# Patient Record
Sex: MALE | Race: WHITE | NOT HISPANIC OR LATINO | Employment: OTHER | URBAN - METROPOLITAN AREA
[De-identification: names, ages, dates, MRNs, and addresses within clinical notes are randomized per-mention and may not be internally consistent; named-entity substitution may affect disease eponyms.]

---

## 2017-11-30 ENCOUNTER — PRE VISIT (OUTPATIENT)
Dept: CARDIOLOGY | Facility: CLINIC | Age: 67
End: 2017-11-30

## 2017-11-30 NOTE — TELEPHONE ENCOUNTER
ECHO: 01-17-08:  The study was technically difficult.  The left ventricle is normal in size. Left ventricular systolic function is normal. Ejection Fraction = >55%. Regional wall motion abnormalities cannot be excluded due to limited visualization. The transmitral Doppler flow    suggests increased left atrial pressure. The IVC is normal in size with reduced respiratory variability, consistent with mildly right atrial pressure. There is no pericardial effusion.         Echo STRESS: 1/22/2013  Interpretation Summary  This was a normal stress echocardiogram. The patient exhibited no chest pain during exercise. LVEF 55-60% at baseline and increased to >70% with exercise.         CATH: 10/16/02  52 y/o gentleman with known CAD, h/o PTCA/ stent of the distal RCA in 1997, known moderate stenosis of the mid LAD. Returns with recurrent angina symptoms at rest. Referred for cors angio:  LM is normal, LAD mid has a long segement of stenosis(75%) with the LCX with mild luminal irregularities. RCA is occluded proximally with collaterals from the left filling upto the distal RCA stent.  LV gram : normal without wall motion abnormalities  Recommendations:  1)CABG  2)LDL goal < 85  3)HDL > 45  4)ASA  5)Lisinopril 10mg po qd  6)beta blockers.

## 2017-12-01 ENCOUNTER — OFFICE VISIT (OUTPATIENT)
Dept: CARDIOLOGY | Facility: CLINIC | Age: 67
End: 2017-12-01
Attending: INTERNAL MEDICINE
Payer: COMMERCIAL

## 2017-12-01 ENCOUNTER — RADIANT APPOINTMENT (OUTPATIENT)
Dept: CARDIOLOGY | Facility: CLINIC | Age: 67
End: 2017-12-01

## 2017-12-01 VITALS
SYSTOLIC BLOOD PRESSURE: 122 MMHG | WEIGHT: 230.7 LBS | DIASTOLIC BLOOD PRESSURE: 83 MMHG | BODY MASS INDEX: 37.07 KG/M2 | HEART RATE: 50 BPM | HEIGHT: 66 IN | OXYGEN SATURATION: 95 %

## 2017-12-01 DIAGNOSIS — I25.10 CORONARY ARTERY DISEASE INVOLVING NATIVE CORONARY ARTERY OF NATIVE HEART WITHOUT ANGINA PECTORIS: Primary | ICD-10-CM

## 2017-12-01 PROCEDURE — 99213 OFFICE O/P EST LOW 20 MIN: CPT | Mod: ZP | Performed by: INTERNAL MEDICINE

## 2017-12-01 PROCEDURE — 99212 OFFICE O/P EST SF 10 MIN: CPT | Mod: ZF

## 2017-12-01 RX ORDER — ATORVASTATIN CALCIUM 40 MG/1
80 TABLET, FILM COATED ORAL DAILY
COMMUNITY
Start: 2017-03-07 | End: 2022-10-12

## 2017-12-01 ASSESSMENT — PAIN SCALES - GENERAL: PAINLEVEL: NO PAIN (0)

## 2017-12-01 NOTE — PROGRESS NOTES
HPI     Mr. Emile Wood is a 67 year old male with hx of CAD (s/p PTCA to RCA in 1997), s/p CABGx2 in 2002 (LIMA and radial artery grafts per patient, other details not available at this point), HTN, HLD, peripheral neuropathy and Gout is here for cardiology follow up visit.   The patient has no chest pains, has good exercise tolerance and feels well. He walks 1-2 miles/day with out any cardiac symptoms. Denies PETTIT, LE edema, orthopnea, PND, palpitations or syncope. He hasn't used s/l Nitro after the CABG.  Had lumbar spine surgery earlier this year.  Had moderate aortic stenosis on echo last year. Visit this year is to review progression of AS.    PAST MEDICAL HISTORY:  Past Medical History:   Diagnosis Date     CAD (coronary artery disease)      Chronic sinusitis      Gout, chronic      Hypertension      Numbness and tingling     peripheral neuropathy     Peripheral neuropathy      Stented coronary artery     stent and CABG       CURRENT MEDICATIONS:  Current Outpatient Prescriptions   Medication Sig Dispense Refill     atorvastatin (LIPITOR) 40 MG tablet Take 40 mg by mouth       oxyCODONE-acetaminophen (PERCOCET)  MG per tablet Take 1 tablet by mouth daily       Coenzyme Q10 (COQ10 PO) Take 200 mg by mouth daily       ASPIRIN PO Take 325 mg by mouth       B Complex Vitamins (VITAMIN B COMPLEX PO) Take 340 mg by mouth.       METOPROLOL TARTRATE PO Take 50 mg by mouth 2 times daily.       AMLODIPINE BESYLATE PO Take 10 mg by mouth daily.       Pregabalin (LYRICA PO) Take 200 mg by mouth 2 times daily. 2 tablets twice a day       ALLOPURINOL PO Take 300 mg by mouth daily.       GEMFIBROZIL PO Take 600 mg by mouth 2 times daily (before meals).       Cholecalciferol (VITAMIN D3 PO) Take 1,000 Units by mouth daily.       ezetimibe-simvastatin (VYTORIN) 10-20 MG per tablet Take 1 tablet by mouth At Bedtime.       nitroglycerin (NITROSTAT) 0.4 MG SL tablet Place 1 tablet under the tongue every 5 minutes as  "needed for chest pain (CALL 911 IF NOT IMPROVED AFTER THREE CONSECUTIVE DOSES). (Patient not taking: Reported on 12/1/2017) 25 tablet 0       PAST SURGICAL HISTORY:  Past Surgical History:   Procedure Laterality Date     ARTHROPLASTY KNEE  4/29/2013    Procedure: ARTHROPLASTY KNEE;  LEFT TOTAL KNEE ARTHROPLASTY ;  Surgeon: Luciano Mcgraw MD;  Location: [facility] OR     cabg       ORTHOPEDIC SURGERY      hallie knee, L4, c5, shoulder       ALLERGIES     Allergies   Allergen Reactions     Epinephrine      Chest tightness       FAMILY HISTORY:  No family history on file.    SOCIAL HISTORY:  History     Social History     Marital Status:      Spouse Name: N/A     Number of Children: N/A     Years of Education: N/A     Social History Main Topics     Smoking status: Current Some Day Smoker     Smokeless tobacco: Not on file      Comment: smoke cigars     Alcohol Use: Yes      Comment: daily     Drug Use: No     Sexual Activity: Not on file     Other Topics Concern     Not on file     Social History Narrative     No narrative on file       ROS:   Constitutional: No fever, chills, or sweats. No weight gain/loss   ENT: No visual disturbance, ear ache, epistaxis, sore throat  Allergies/Immunologic: Negative.   Respiratory: No cough, hemoptysia  Cardiovascular: As per HPI  GI: No nausea, vomiting, hematemesis, melena, or hematochezia  : No urinary frequency, dysuria, or hematuria  Integument: Negative  Psychiatric: Negative  Neuro: Negative  Endocrinology: Negative   Musculoskeletal: Negative    EXAM:  /83 (BP Location: Left arm, Cuff Size: Adult Large)  Pulse 50  Ht 1.676 m (5' 6\")  Wt 104.6 kg (230 lb 11.2 oz)  SpO2 95%  BMI 37.24 kg/m2  In general, the patient is a pleasant male in no apparent distress.    HEENT:  Sclerae white, not injected.  Nares clear.  Pharynx without erythema or exudate.  Dentition intact.    Neck: No adenopathy.  No thyromegaly. Carotids +4/4 bilaterally without bruits.  No jugular venous " distension.   Heart: RRR. Normal S1, S2 splits physiologically. ESM 3/6 at aortic area radiating to the base of the neck. No rub, click, or gallop. The PMI is in the 5th ICS in the midclavicular line. There is no heave.    Lungs: CTA.  No ronchi, wheezes, rales.  No dullness to percussion.   Abdomen: Soft, nontender, nondistended. No organomegaly.  No bruits.   Extremities: No clubbing, cyanosis, or edema.  The pulses are +4/4 at the radial, brachial, femoral, popliteal, DP, and PT sites bilaterally.  No bruits are noted.  Neurologic: Alert and oriented to person/place/time, normal speech, gait and affect  Skin: No petechiae, purpura or rash.    Labs:  LIPID RESULTS:  Lab Results   Component Value Date    CHOL 117 09/09/2014    HDL 30 09/09/2014    LDL 66 09/09/2014    TRIG 107 09/09/2014    CHOLHDLRATIO 3.9 02/15/2011       LIVER ENZYME RESULTS:  Lab Results   Component Value Date    AST 25 02/15/2011    ALT 31 02/15/2011       CBC RESULTS:  Lab Results   Component Value Date    WBC 6.7 09/09/2014    RBC 4.67 09/09/2014    HGB 14.0 09/09/2014    HCT 41.8 09/09/2014    MCV 90 09/09/2014    MCH 30 09/09/2014    MCHC 33.5 09/09/2014    RDW 13.1 09/09/2014     09/09/2014     01/22/2013       BMP RESULTS:  Lab Results   Component Value Date     04/29/2013    POTASSIUM 4.0 04/29/2013    CHLORIDE 106 04/29/2013    CO2 25 04/29/2013    ANIONGAP 13 04/29/2013     (A) 09/09/2014     (A) 09/09/2014    BUN 22 04/29/2013    CR 0.70 04/29/2013    GFRESTIMATED >90 04/29/2013    GFRESTBLACK >90 04/29/2013    CATE 9.3 04/29/2013        A1C RESULTS:  Lab Results   Component Value Date    A1C 5.8 09/09/2014       INR RESULTS:  Lab Results   Component Value Date    INR 1.44 (H) 05/02/2013    INR 1.35 (H) 05/01/2013       Procedures     ECHO: 01-17-08:  The study was technically difficult.  The left ventricle is normal in size. Left ventricular systolic function is normal. Ejection Fraction = >55%.  Regional wall motion abnormalities cannot be excluded due to limited visualization. The transmitral Doppler flow   suggests increased left atrial pressure. The IVC is normal in size with reduced respiratory variability, consistent with mildly right atrial pressure. There is no pericardial effusion.       Echo STRESS: 1/22/2013  Interpretation Summary  This was a normal stress echocardiogram. The patient exhibited no chest pain during exercise. LVEF 55-60% at baseline and increased to >70% with exercise.       CATH: 10/16/02  52 y/o gentleman with known CAD, h/o PTCA/ stent of the distal RCA in 1997, known moderate stenosis of the mid LAD. Returns with recurrent angina symptoms at rest. Referred for cors angio:  LM is normal, LAD mid has a long segement of stenosis(75%) with the LCX with mild luminal irregularities. RCA is occluded proximally with collaterals from the left filling upto the distal RCA stent.  LV gram : normal without wall motion abnormalities  Recommendations:  1)CABG  2)LDL goal < 85  3)HDL > 45  4)ASA  5)Lisinopril 10mg po qd  6)beta blockers.      Assessment and Plan:     Mr. Emile Wood is a 67 year old male with hx of CAD (s/p PTCA to RCA in 1997), s/p CABGx2 in 2002 (LIMA and radial artery grafts per patient, other details not available at this point), HTN, HLD, peripheral neuropathy and Gout is here for cardiology follow up visit.  Overall doing well. Walks 1 - 2 miles every day with no dyspnea or chest pains. No palpitations or syncope.  Echo today shows : . Lipids look good.        CC  Patient Care Team:  Luciano Hogan MD as PCP - General  KebedeEmerson MD as MD (Internal Medicine)  LUCIANO HOGAN

## 2017-12-01 NOTE — NURSING NOTE
Med Reconcile: Reviewed and verified all current medications with the patient. The updated medication list was printed and given to the patient.  Return Appointment: Follow up in one year. Patient given instructions regarding scheduling next clinic visit. Patient demonstrated understanding of this information and agreed to call with further questions or concerns.  Patient stated he understood all health information given and agreed to call with further questions or concerns.

## 2017-12-01 NOTE — PATIENT INSTRUCTIONS
Thank you for your visit today.  Please call me with any questions or concerns.   Harpreet Brown RN  Cardiology Care Coordinator  [phone], press option 1 then option 3

## 2017-12-01 NOTE — LETTER
12/1/2017      RE: Emile Wood  [id number]  [facility] [address]       Dear Colleague,    Thank you for the opportunity to participate in the care of your patient, Emile Wood, at the [facility] at [facility]. Please see a copy of my visit note below.    HPI     Mr. Emile Wood is a 67 year old male with hx of CAD (s/p PTCA to RCA in 1997), s/p CABGx2 in 2002 (LIMA and radial artery grafts per patient, other details not available at this point), HTN, HLD, peripheral neuropathy and Gout is here for cardiology follow up visit.   The patient has no chest pains, has good exercise tolerance and feels well. He walks 1-2 miles/day with out any cardiac symptoms. Denies PETTIT, LE edema, orthopnea, PND, palpitations or syncope. He hasn't used s/l Nitro after the CABG.  Had lumbar spine surgery earlier this year.  Had moderate aortic stenosis on echo last year. Visit this year is to review progression of AS.    PAST MEDICAL HISTORY:  Past Medical History:   Diagnosis Date     CAD (coronary artery disease)      Chronic sinusitis      Gout, chronic      Hypertension      Numbness and tingling     peripheral neuropathy     Peripheral neuropathy      Stented coronary artery     stent and CABG       CURRENT MEDICATIONS:  Current Outpatient Prescriptions   Medication Sig Dispense Refill     atorvastatin (LIPITOR) 40 MG tablet Take 40 mg by mouth       oxyCODONE-acetaminophen (PERCOCET)  MG per tablet Take 1 tablet by mouth daily       Coenzyme Q10 (COQ10 PO) Take 200 mg by mouth daily       ASPIRIN PO Take 325 mg by mouth       B Complex Vitamins (VITAMIN B COMPLEX PO) Take 340 mg by mouth.       METOPROLOL TARTRATE PO Take 50 mg by mouth 2 times daily.       AMLODIPINE BESYLATE PO Take 10 mg by mouth daily.       Pregabalin (LYRICA PO) Take 200 mg by mouth 2 times daily. 2 tablets twice a day       ALLOPURINOL PO Take 300 mg by mouth daily.        "GEMFIBROZIL PO Take 600 mg by mouth 2 times daily (before meals).       Cholecalciferol (VITAMIN D3 PO) Take 1,000 Units by mouth daily.       ezetimibe-simvastatin (VYTORIN) 10-20 MG per tablet Take 1 tablet by mouth At Bedtime.       nitroglycerin (NITROSTAT) 0.4 MG SL tablet Place 1 tablet under the tongue every 5 minutes as needed for chest pain (CALL 911 IF NOT IMPROVED AFTER THREE CONSECUTIVE DOSES). (Patient not taking: Reported on 12/1/2017) 25 tablet 0       PAST SURGICAL HISTORY:  Past Surgical History:   Procedure Laterality Date     ARTHROPLASTY KNEE  4/29/2013    Procedure: ARTHROPLASTY KNEE;  LEFT TOTAL KNEE ARTHROPLASTY ;  Surgeon: Luciano Mcgraw MD;  Location: [facility] OR     cabg       ORTHOPEDIC SURGERY      hallie knee, L4, c5, shoulder       ALLERGIES     Allergies   Allergen Reactions     Epinephrine      Chest tightness       FAMILY HISTORY:  No family history on file.    SOCIAL HISTORY:  History     Social History     Marital Status:      Spouse Name: N/A     Number of Children: N/A     Years of Education: N/A     Social History Main Topics     Smoking status: Current Some Day Smoker     Smokeless tobacco: Not on file      Comment: smoke cigars     Alcohol Use: Yes      Comment: daily     Drug Use: No     Sexual Activity: Not on file     Other Topics Concern     Not on file     Social History Narrative     No narrative on file       ROS:   Constitutional: No fever, chills, or sweats. No weight gain/loss   ENT: No visual disturbance, ear ache, epistaxis, sore throat  Allergies/Immunologic: Negative.   Respiratory: No cough, hemoptysia  Cardiovascular: As per HPI  GI: No nausea, vomiting, hematemesis, melena, or hematochezia  : No urinary frequency, dysuria, or hematuria  Integument: Negative  Psychiatric: Negative  Neuro: Negative  Endocrinology: Negative   Musculoskeletal: Negative    EXAM:  /83 (BP Location: Left arm, Cuff Size: Adult Large)  Pulse 50  Ht 1.676 m (5' 6\")  Wt 104.6 " kg (230 lb 11.2 oz)  SpO2 95%  BMI 37.24 kg/m2  In general, the patient is a pleasant male in no apparent distress.    HEENT:  Sclerae white, not injected.  Nares clear.  Pharynx without erythema or exudate.  Dentition intact.    Neck: No adenopathy.  No thyromegaly. Carotids +4/4 bilaterally without bruits.  No jugular venous distension.   Heart: RRR. Normal S1, S2 splits physiologically. ESM 3/6 at aortic area radiating to the base of the neck. No rub, click, or gallop. The PMI is in the 5th ICS in the midclavicular line. There is no heave.    Lungs: CTA.  No ronchi, wheezes, rales.  No dullness to percussion.   Abdomen: Soft, nontender, nondistended. No organomegaly.  No bruits.   Extremities: No clubbing, cyanosis, or edema.  The pulses are +4/4 at the radial, brachial, femoral, popliteal, DP, and PT sites bilaterally.  No bruits are noted.  Neurologic: Alert and oriented to person/place/time, normal speech, gait and affect  Skin: No petechiae, purpura or rash.    Labs:  LIPID RESULTS:  Lab Results   Component Value Date    CHOL 117 09/09/2014    HDL 30 09/09/2014    LDL 66 09/09/2014    TRIG 107 09/09/2014    CHOLHDLRATIO 3.9 02/15/2011       LIVER ENZYME RESULTS:  Lab Results   Component Value Date    AST 25 02/15/2011    ALT 31 02/15/2011       CBC RESULTS:  Lab Results   Component Value Date    WBC 6.7 09/09/2014    RBC 4.67 09/09/2014    HGB 14.0 09/09/2014    HCT 41.8 09/09/2014    MCV 90 09/09/2014    MCH 30 09/09/2014    MCHC 33.5 09/09/2014    RDW 13.1 09/09/2014     09/09/2014     01/22/2013       BMP RESULTS:  Lab Results   Component Value Date     04/29/2013    POTASSIUM 4.0 04/29/2013    CHLORIDE 106 04/29/2013    CO2 25 04/29/2013    ANIONGAP 13 04/29/2013     (A) 09/09/2014     (A) 09/09/2014    BUN 22 04/29/2013    CR 0.70 04/29/2013    GFRESTIMATED >90 04/29/2013    GFRESTBLACK >90 04/29/2013    CATE 9.3 04/29/2013        A1C RESULTS:  Lab Results   Component  Value Date    A1C 5.8 09/09/2014       INR RESULTS:  Lab Results   Component Value Date    INR 1.44 (H) 05/02/2013    INR 1.35 (H) 05/01/2013       Procedures     ECHO: 01-17-08:  The study was technically difficult.  The left ventricle is normal in size. Left ventricular systolic function is normal. Ejection Fraction = >55%. Regional wall motion abnormalities cannot be excluded due to limited visualization. The transmitral Doppler flow   suggests increased left atrial pressure. The IVC is normal in size with reduced respiratory variability, consistent with mildly right atrial pressure. There is no pericardial effusion.       Echo STRESS: 1/22/2013  Interpretation Summary  This was a normal stress echocardiogram. The patient exhibited no chest pain during exercise. LVEF 55-60% at baseline and increased to >70% with exercise.       CATH: 10/16/02  52 y/o gentleman with known CAD, h/o PTCA/ stent of the distal RCA in 1997, known moderate stenosis of the mid LAD. Returns with recurrent angina symptoms at rest. Referred for cors angio:  LM is normal, LAD mid has a long segement of stenosis(75%) with the LCX with mild luminal irregularities. RCA is occluded proximally with collaterals from the left filling upto the distal RCA stent.  LV gram : normal without wall motion abnormalities  Recommendations:  1)CABG  2)LDL goal < 85  3)HDL > 45  4)ASA  5)Lisinopril 10mg po qd  6)beta blockers.      Assessment and Plan:     Mr. Emile Wood is a 67 year old male with hx of CAD (s/p PTCA to RCA in 1997), s/p CABGx2 in 2002 (LIMA and radial artery grafts per patient, other details not available at this point), HTN, HLD, peripheral neuropathy and Gout is here for cardiology follow up visit.  Overall doing well. Walks 1 - 2 miles every day with no dyspnea or chest pains. No palpitations or syncope.  Echo today shows : . Lipids look good.      Please do not hesitate to contact me if you have any questions/concerns.     Sincerely,      Emerson Kebede MD        CC  Patient Care Team:  Luciano Hogan MD as PCP - General

## 2017-12-01 NOTE — NURSING NOTE
Chief Complaint   Patient presents with     Follow Up For     CAD (s/p PTCA to RCA in 1997), s/p CABGx2 in 2002     Vitals were taken and medications were reconciled.     Orly Gore MA  12:40 PM

## 2017-12-01 NOTE — MR AVS SNAPSHOT
"              After Visit Summary   12/1/2017    Emile Wood    MRN: [id number]           Patient Information     Date Of Birth          1950        Visit Information        Provider Department      12/1/2017 1:00 PM Emerson Kebede MD [facility]        Care Instructions    Thank you for your visit today.  Please call me with any questions or concerns.   Harpreet Brown RN  Cardiology Care Coordinator  [phone], press option 1 then option 3          Follow-ups after your visit        Follow-up notes from your care team     Return in about 1 year (around 12/1/2018) for valve disorder, valve clinic.      Who to contact     If you have questions or need follow up information about today's clinic visit or your schedule please contact [facility] directly at [phone].  Normal or non-critical lab and imaging results will be communicated to you by MyChart, letter or phone within 4 business days after the clinic has received the results. If you do not hear from us within 7 days, please contact the clinic through MyChart or phone. If you have a critical or abnormal lab result, we will notify you by phone as soon as possible.  Submit refill requests through [employer] or call your pharmacy and they will forward the refill request to us. Please allow 3 business days for your refill to be completed.          Additional Information About Your Visit        [facility] Information     [employer] lets you send messages to your doctor, view your test results, renew your prescriptions, schedule appointments and more. To sign up, go to [email or web address] . Click on \"Log in\" on the left side of the screen, which will take you to the Welcome page. Then click on \"Sign up Now\" on the right side of the page.     You will be asked to enter the access code listed below, as well as some personal information. Please follow the directions to create your username and password.     Your access code is: " "[email or web address]  Expires: 2/15/2018  6:31 AM     Your access code will  in 90 days. If you need help or a new code, please call your [facility] clinic or [phone].        Care EveryWhere ID     This is your Care EveryWhere ID. This could be used by other organizations to access your [facility] medical records  [id number]        Your Vitals Were     Pulse Height Pulse Oximetry BMI (Body Mass Index)          50 1.676 m (5' 6\") 95% 37.24 kg/m2         Blood Pressure from Last 3 Encounters:   17 122/83   16 139/67   10/22/14 115/66    Weight from Last 3 Encounters:   17 104.6 kg (230 lb 11.2 oz)   16 101.2 kg (223 lb)   13 103.4 kg (228 lb)              Today, you had the following     No orders found for display       Primary Care Provider Office Phone # Fax #    Luciano Hogan [phone]       [facility] [address] [facility] [address]  [facility] [address]        Equal Access to Services     [facility]: Hadii aad ku hadasho Soomaali, waaxda luqadaha, qaybta kaalmada adeegyada, waxay ceasarin hayvaldon carolina newton . So [facility] [phone].    ATENCIÓN: Si habla español, tiene a jett disposición servicios gratuitos de asistencia lingüística. Llame al [phone].    We comply with applicable federal civil rights laws and [place] laws. We do not discriminate on the basis of race, color, national origin, age, disability, sex, sexual orientation, or gender identity.            Thank you!     Thank you for choosing [facility]  for your care. Our goal is always to provide you with excellent care. Hearing back from our patients is one way we can continue to improve our services. Please take a few minutes to complete the written survey that you may receive in the mail after your visit with us. Thank you!             Your Updated Medication List - Protect others around you: Learn how to safely use, store and throw away your medicines at www.disposemymeds.org.    "       This list is accurate as of: 12/1/17  1:25 PM.  Always use your most recent med list.                   Brand Name Dispense Instructions for use Diagnosis    ALLOPURINOL PO      Take 300 mg by mouth daily.        AMLODIPINE BESYLATE PO      Take 10 mg by mouth daily.        ASPIRIN PO      Take 325 mg by mouth        atorvastatin 40 MG tablet    LIPITOR     Take 40 mg by mouth        COQ10 PO      Take 200 mg by mouth daily        ezetimibe-simvastatin 10-20 MG per tablet    VYTORIN     Take 1 tablet by mouth At Bedtime.        GEMFIBROZIL PO      Take 600 mg by mouth 2 times daily (before meals).        LYRICA PO      Take 200 mg by mouth 2 times daily. 2 tablets twice a day        METOPROLOL TARTRATE PO      Take 50 mg by mouth 2 times daily.        nitroGLYcerin 0.4 MG sublingual tablet    NITROSTAT    25 tablet    Place 1 tablet under the tongue every 5 minutes as needed for chest pain (CALL 911 IF NOT IMPROVED AFTER THREE CONSECUTIVE DOSES).        PERCOCET  MG per tablet   Generic drug:  oxyCODONE-acetaminophen      Take 1 tablet by mouth daily        VITAMIN B COMPLEX PO      Take 340 mg by mouth.        VITAMIN D3 PO      Take 1,000 Units by mouth daily.

## 2018-05-02 ENCOUNTER — HOSPITAL ENCOUNTER (OUTPATIENT)
Facility: CLINIC | Age: 68
Discharge: HOME | End: 2018-05-02
Attending: INTERNAL MEDICINE | Admitting: INTERNAL MEDICINE
Payer: COMMERCIAL

## 2018-05-02 ENCOUNTER — ANESTHESIA (OUTPATIENT)
Dept: SURGERY | Facility: CLINIC | Age: 68
End: 2018-05-02
Payer: COMMERCIAL

## 2018-05-02 ENCOUNTER — ANESTHESIA EVENT (OUTPATIENT)
Dept: SURGERY | Facility: CLINIC | Age: 68
End: 2018-05-02
Payer: COMMERCIAL

## 2018-05-02 VITALS
SYSTOLIC BLOOD PRESSURE: 125 MMHG | TEMPERATURE: 98.2 F | HEIGHT: 65 IN | BODY MASS INDEX: 37.49 KG/M2 | HEART RATE: 69 BPM | DIASTOLIC BLOOD PRESSURE: 85 MMHG | WEIGHT: 225 LBS | OXYGEN SATURATION: 94 % | RESPIRATION RATE: 16 BRPM

## 2018-05-02 LAB
COLONOSCOPY: NORMAL
CREAT SERPL-MCNC: 0.68 MG/DL (ref 0.66–1.25)
GFR SERPL CREATININE-BSD FRML MDRD: >90 ML/MIN/1.7M2
POTASSIUM SERPL-SCNC: 3.5 MMOL/L (ref 3.4–5.3)
UPPER GI ENDOSCOPY: NORMAL

## 2018-05-02 PROCEDURE — 25000125 ZZHC RX 250: Performed by: NURSE ANESTHETIST, CERTIFIED REGISTERED

## 2018-05-02 PROCEDURE — 84132 ASSAY OF SERUM POTASSIUM: CPT | Performed by: ANESTHESIOLOGY

## 2018-05-02 PROCEDURE — 40000037 ZZH STATISTIC COLONOSCOPY (OR PROCEDURE): Performed by: INTERNAL MEDICINE

## 2018-05-02 PROCEDURE — 36415 COLL VENOUS BLD VENIPUNCTURE: CPT | Performed by: ANESTHESIOLOGY

## 2018-05-02 PROCEDURE — 93010 ELECTROCARDIOGRAM REPORT: CPT | Performed by: INTERNAL MEDICINE

## 2018-05-02 PROCEDURE — 40000305 ZZH STATISTIC PRE PROC ASSESS I: Performed by: INTERNAL MEDICINE

## 2018-05-02 PROCEDURE — 25000128 H RX IP 250 OP 636: Performed by: NURSE ANESTHETIST, CERTIFIED REGISTERED

## 2018-05-02 PROCEDURE — 88342 IMHCHEM/IMCYTCHM 1ST ANTB: CPT | Performed by: INTERNAL MEDICINE

## 2018-05-02 PROCEDURE — 25000128 H RX IP 250 OP 636: Performed by: INTERNAL MEDICINE

## 2018-05-02 PROCEDURE — 40000063 ZZH STATISTIC EGD (OR PROCEDURE): Performed by: INTERNAL MEDICINE

## 2018-05-02 PROCEDURE — 25000566 ZZH SEVOFLURANE, EA 15 MIN: Performed by: INTERNAL MEDICINE

## 2018-05-02 PROCEDURE — 88305 TISSUE EXAM BY PATHOLOGIST: CPT | Performed by: INTERNAL MEDICINE

## 2018-05-02 PROCEDURE — 71000027 ZZH RECOVERY PHASE 2 EACH 15 MINS: Performed by: INTERNAL MEDICINE

## 2018-05-02 PROCEDURE — 27210794 ZZH OR GENERAL SUPPLY STERILE: Performed by: INTERNAL MEDICINE

## 2018-05-02 PROCEDURE — 25000128 H RX IP 250 OP 636: Performed by: ANESTHESIOLOGY

## 2018-05-02 PROCEDURE — 88305 TISSUE EXAM BY PATHOLOGIST: CPT | Mod: 26 | Performed by: INTERNAL MEDICINE

## 2018-05-02 PROCEDURE — 71000012 ZZH RECOVERY PHASE 1 LEVEL 1 FIRST HR: Performed by: INTERNAL MEDICINE

## 2018-05-02 PROCEDURE — 37000008 ZZH ANESTHESIA TECHNICAL FEE, 1ST 30 MIN: Performed by: INTERNAL MEDICINE

## 2018-05-02 PROCEDURE — 37000009 ZZH ANESTHESIA TECHNICAL FEE, EACH ADDTL 15 MIN: Performed by: INTERNAL MEDICINE

## 2018-05-02 PROCEDURE — 88342 IMHCHEM/IMCYTCHM 1ST ANTB: CPT | Mod: 26 | Performed by: INTERNAL MEDICINE

## 2018-05-02 PROCEDURE — 36000050 ZZH SURGERY LEVEL 2 1ST 30 MIN: Performed by: INTERNAL MEDICINE

## 2018-05-02 PROCEDURE — 36000052 ZZH SURGERY LEVEL 2 EA 15 ADDTL MIN: Performed by: INTERNAL MEDICINE

## 2018-05-02 PROCEDURE — 82565 ASSAY OF CREATININE: CPT | Performed by: ANESTHESIOLOGY

## 2018-05-02 RX ORDER — KETOROLAC TROMETHAMINE 30 MG/ML
15 INJECTION, SOLUTION INTRAMUSCULAR; INTRAVENOUS ONCE
Status: DISCONTINUED | OUTPATIENT
Start: 2018-05-02 | End: 2018-05-02

## 2018-05-02 RX ORDER — LABETALOL HYDROCHLORIDE 5 MG/ML
10 INJECTION, SOLUTION INTRAVENOUS
Status: DISCONTINUED | OUTPATIENT
Start: 2018-05-02 | End: 2018-05-02 | Stop reason: HOSPADM

## 2018-05-02 RX ORDER — ONDANSETRON 4 MG/1
4 TABLET, ORALLY DISINTEGRATING ORAL EVERY 30 MIN PRN
Status: DISCONTINUED | OUTPATIENT
Start: 2018-05-02 | End: 2018-05-02 | Stop reason: HOSPADM

## 2018-05-02 RX ORDER — ALBUTEROL SULFATE 0.83 MG/ML
2.5 SOLUTION RESPIRATORY (INHALATION) EVERY 4 HOURS PRN
Status: DISCONTINUED | OUTPATIENT
Start: 2018-05-02 | End: 2018-05-02 | Stop reason: HOSPADM

## 2018-05-02 RX ORDER — MEPERIDINE HYDROCHLORIDE 25 MG/ML
12.5 INJECTION INTRAMUSCULAR; INTRAVENOUS; SUBCUTANEOUS EVERY 5 MIN PRN
Status: DISCONTINUED | OUTPATIENT
Start: 2018-05-02 | End: 2018-05-02 | Stop reason: HOSPADM

## 2018-05-02 RX ORDER — FLUMAZENIL 0.1 MG/ML
0.2 INJECTION, SOLUTION INTRAVENOUS
Status: DISCONTINUED | OUTPATIENT
Start: 2018-05-02 | End: 2018-05-02 | Stop reason: HOSPADM

## 2018-05-02 RX ORDER — ONDANSETRON 4 MG/1
4 TABLET, ORALLY DISINTEGRATING ORAL EVERY 6 HOURS PRN
Status: DISCONTINUED | OUTPATIENT
Start: 2018-05-02 | End: 2018-05-02 | Stop reason: HOSPADM

## 2018-05-02 RX ORDER — GLYCOPYRROLATE 0.2 MG/ML
INJECTION, SOLUTION INTRAMUSCULAR; INTRAVENOUS PRN
Status: DISCONTINUED | OUTPATIENT
Start: 2018-05-02 | End: 2018-05-02

## 2018-05-02 RX ORDER — FENTANYL CITRATE 50 UG/ML
100 INJECTION, SOLUTION INTRAMUSCULAR; INTRAVENOUS ONCE
Status: COMPLETED | OUTPATIENT
Start: 2018-05-02 | End: 2018-05-02

## 2018-05-02 RX ORDER — SODIUM CHLORIDE, SODIUM LACTATE, POTASSIUM CHLORIDE, CALCIUM CHLORIDE 600; 310; 30; 20 MG/100ML; MG/100ML; MG/100ML; MG/100ML
INJECTION, SOLUTION INTRAVENOUS CONTINUOUS
Status: DISCONTINUED | OUTPATIENT
Start: 2018-05-02 | End: 2018-05-02 | Stop reason: HOSPADM

## 2018-05-02 RX ORDER — ONDANSETRON 2 MG/ML
4 INJECTION INTRAMUSCULAR; INTRAVENOUS EVERY 6 HOURS PRN
Status: DISCONTINUED | OUTPATIENT
Start: 2018-05-02 | End: 2018-05-02 | Stop reason: HOSPADM

## 2018-05-02 RX ORDER — DEXAMETHASONE SODIUM PHOSPHATE 4 MG/ML
INJECTION, SOLUTION INTRA-ARTICULAR; INTRALESIONAL; INTRAMUSCULAR; INTRAVENOUS; SOFT TISSUE PRN
Status: DISCONTINUED | OUTPATIENT
Start: 2018-05-02 | End: 2018-05-02

## 2018-05-02 RX ORDER — KETOROLAC TROMETHAMINE 30 MG/ML
15 INJECTION, SOLUTION INTRAMUSCULAR; INTRAVENOUS ONCE
Status: COMPLETED | OUTPATIENT
Start: 2018-05-02 | End: 2018-05-02

## 2018-05-02 RX ORDER — PROPOFOL 10 MG/ML
INJECTION, EMULSION INTRAVENOUS PRN
Status: DISCONTINUED | OUTPATIENT
Start: 2018-05-02 | End: 2018-05-02

## 2018-05-02 RX ORDER — LIDOCAINE HYDROCHLORIDE 10 MG/ML
INJECTION, SOLUTION INFILTRATION; PERINEURAL PRN
Status: DISCONTINUED | OUTPATIENT
Start: 2018-05-02 | End: 2018-05-02

## 2018-05-02 RX ORDER — LIDOCAINE 40 MG/G
CREAM TOPICAL
Status: DISCONTINUED | OUTPATIENT
Start: 2018-05-02 | End: 2018-05-02 | Stop reason: HOSPADM

## 2018-05-02 RX ORDER — NALOXONE HYDROCHLORIDE 0.4 MG/ML
.1-.4 INJECTION, SOLUTION INTRAMUSCULAR; INTRAVENOUS; SUBCUTANEOUS
Status: DISCONTINUED | OUTPATIENT
Start: 2018-05-02 | End: 2018-05-02 | Stop reason: HOSPADM

## 2018-05-02 RX ORDER — FENTANYL CITRATE 50 UG/ML
25-50 INJECTION, SOLUTION INTRAMUSCULAR; INTRAVENOUS
Status: DISCONTINUED | OUTPATIENT
Start: 2018-05-02 | End: 2018-05-02 | Stop reason: HOSPADM

## 2018-05-02 RX ORDER — ONDANSETRON 2 MG/ML
4 INJECTION INTRAMUSCULAR; INTRAVENOUS EVERY 30 MIN PRN
Status: DISCONTINUED | OUTPATIENT
Start: 2018-05-02 | End: 2018-05-02 | Stop reason: HOSPADM

## 2018-05-02 RX ORDER — DIAZEPAM 10 MG/2ML
2.5 INJECTION, SOLUTION INTRAMUSCULAR; INTRAVENOUS
Status: DISCONTINUED | OUTPATIENT
Start: 2018-05-02 | End: 2018-05-02 | Stop reason: HOSPADM

## 2018-05-02 RX ORDER — ONDANSETRON 2 MG/ML
4 INJECTION INTRAMUSCULAR; INTRAVENOUS
Status: COMPLETED | OUTPATIENT
Start: 2018-05-02 | End: 2018-05-02

## 2018-05-02 RX ORDER — DIMENHYDRINATE 50 MG/ML
25 INJECTION, SOLUTION INTRAMUSCULAR; INTRAVENOUS
Status: DISCONTINUED | OUTPATIENT
Start: 2018-05-02 | End: 2018-05-02 | Stop reason: HOSPADM

## 2018-05-02 RX ADMIN — Medication 100 MG: at 12:25

## 2018-05-02 RX ADMIN — SODIUM CHLORIDE, POTASSIUM CHLORIDE, SODIUM LACTATE AND CALCIUM CHLORIDE: 600; 310; 30; 20 INJECTION, SOLUTION INTRAVENOUS at 12:19

## 2018-05-02 RX ADMIN — GLYCOPYRROLATE 0.3 MG: 0.2 INJECTION, SOLUTION INTRAMUSCULAR; INTRAVENOUS at 12:25

## 2018-05-02 RX ADMIN — FENTANYL CITRATE 100 MCG: 50 INJECTION, SOLUTION INTRAMUSCULAR; INTRAVENOUS at 11:01

## 2018-05-02 RX ADMIN — HYDROMORPHONE HYDROCHLORIDE 1 MG: 1 INJECTION, SOLUTION INTRAMUSCULAR; INTRAVENOUS; SUBCUTANEOUS at 12:25

## 2018-05-02 RX ADMIN — LIDOCAINE HYDROCHLORIDE 40 MG: 10 INJECTION, SOLUTION INFILTRATION; PERINEURAL at 12:25

## 2018-05-02 RX ADMIN — DEXAMETHASONE SODIUM PHOSPHATE 4 MG: 4 INJECTION, SOLUTION INTRA-ARTICULAR; INTRALESIONAL; INTRAMUSCULAR; INTRAVENOUS; SOFT TISSUE at 12:25

## 2018-05-02 RX ADMIN — PROPOFOL 200 MG: 10 INJECTION, EMULSION INTRAVENOUS at 12:25

## 2018-05-02 RX ADMIN — KETOROLAC TROMETHAMINE 15 MG: 30 INJECTION, SOLUTION INTRAMUSCULAR at 11:03

## 2018-05-02 RX ADMIN — ONDANSETRON 4 MG: 2 INJECTION INTRAMUSCULAR; INTRAVENOUS at 12:25

## 2018-05-02 RX ADMIN — MIDAZOLAM 2 MG: 1 INJECTION INTRAMUSCULAR; INTRAVENOUS at 12:19

## 2018-05-02 ASSESSMENT — LIFESTYLE VARIABLES: TOBACCO_USE: 1

## 2018-05-02 NOTE — ANESTHESIA PREPROCEDURE EVALUATION
Anesthesia Evaluation     . Pt has had prior anesthetic. Type: General and MAC    No history of anesthetic complications          ROS/MED HX    ENT/Pulmonary: Comment: 10 months of abstinence from smoking    (+)SUHAS risk factors hypertension, obese, tobacco use, Past use , . .    Neurologic:     (+)neuropathy - peripheral,     Cardiovascular:     (+) Dyslipidemia, hypertension-Peripheral Vascular Disease-- Carotid Stenosis and Non Symptomatic, CAD, angina-with excertion, past MI,-stent,2017  Drug Eluting Stent .. Taking blood thinners Pt has received instructions: Instructions Given to patient: continuing. . . :. . Previous cardiac testing Echodate:2017results:Global and regional left ventricular function is normal with an EF of 60-65%.  Mild concentric wall thickening consistent with left ventricular hypertrophy  is present.  Grade II or moderate diastolic dysfunction.  Moderate aortic stenosis is present. The aortic valve area is 1.4 cm^2. The  peak aortic velocity is 3.6 m/sec. Mean gradient across the valve is 31 mmHg.  Mild aortic insufficiency is present.  Pulmonary artery systolic pressure cannot be assessed.  The inferior vena cava was normal in size with preserved respiratory  variability.  Estimated mean right atrial pressure is 3 mmHg.  Sinuses of Valsalva is mildly dilated at 3.9 cm.  Ascending aorta is mildly dilated at 4.2 cm.  This study was compared with the study from 1/22/2013. Aortic valve sclerosis  became moderately stenotic.    date: results: date: results: date: results:          METS/Exercise Tolerance:     Hematologic:  - neg hematologic  ROS   (+) Anemia, -      Musculoskeletal:   (+) arthritis, , , -       GI/Hepatic:  - neg GI/hepatic ROS       Renal/Genitourinary:  - ROS Renal section negative       Endo: Comment: Class 2 BMI    (+) Obesity, .      Psychiatric:  - neg psychiatric ROS       Infectious Disease:  - neg infectious disease ROS       Malignancy:      - no malignancy   Other:     - neg other ROS                 Physical Exam  Normal systems: cardiovascular, pulmonary and dental    Airway   Mallampati: II  TM distance: >3 FB  Neck ROM: full    Dental     Cardiovascular   Rhythm and rate: regular and normal      Pulmonary    breath sounds clear to auscultation    Other findings: Lab Test        09/09/14 05/02/13 05/01/13 04/30/13      --          01/22/13                                         0521          0549          0544           --           1000          WBC          6.7           --           --           --           --          6.0           HGB          14.0          --          11.2*        11.1*          < >        14.6          MCV          90            --           --           --           --          86            PLT          279           --           --           --           --          280           INR           --          1.44*        1.35*        1.32*          < >         --            < > = values in this interval not displayed.                  Lab Test        09/09/14 04/30/13 04/29/13 01/22/13                                         0544          0827          1000          NA            --           --          143          142           POTASSIUM     --           --          4.0          4.0           CHLORIDE      --           --          106          104           CO2           --           --          25           26            BUN           --           --          22           17            CR            --           --          0.70         0.69          ANIONGAP      --           --          13           12            CATE           --           --          9.3          9.4           GLC          127*  127*  94           136*         113*                                 Anesthesia Plan      History & Physical Review  History and physical reviewed and following examination; no interval change.    ASA Status:  3 .    NPO  Status:  > 8 hours    Plan for General and ETT with Intravenous induction. Maintenance will be TIVA.    PONV prophylaxis:  Ondansetron (or other 5HT-3) and Dexamethasone or Solumedrol       Postoperative Care  Postoperative pain management:  IV analgesics and Oral pain medications.      Consents  Anesthetic plan, risks, benefits and alternatives discussed with:  Patient.  Use of blood products discussed: No .   .                          .

## 2018-05-02 NOTE — DISCHARGE INSTRUCTIONS
DR. NICOLE LARA M.D.     CLINIC PHONE NUMBER:  [phone]      ESOPHAGOGASTRODUODENOSCOPY DISCHARGE INSTRUCTIONS    You may not drive, use heavy equipment or consume alcohol for 24 hours because the drugs you were given may cause dizziness, drowsiness, forgetfulness and slower reaction time.    Small pieces or tissue (biopsies) or polyps may have been removed.    You may resume your regular diet and medications. Exception: If you had a biopsy or polypectomy, do not take aspirin, aleve (naproxen) or ibuprofen for the next 10 days.  Tylenol (acetaminophen) is safe to take.    Additional instructions:  If you had a biopsy or polypectomy, the pathology report will be sent to your doctor.  If you have not received the results within 10 days, call your doctor's office.    What to watch for:  Problems rarely occur after the procedure.  It is important for you to be aware of the early signs of a possible complication.  Call immediately if you notice any of the followin.  Unusual pain or difficulty swallowing.    2.  Unusual abdominal or chest pain.    3.  Vomiting of blood.    4.  Black or bloody stools.    5.  Temperature above 100.6 degrees F       GENERAL ANESTHESIA OR SEDATION ADULT DISCHARGE INSTRUCTIONS   SPECIAL PRECAUTIONS FOR 24 HOURS AFTER SURGERY    IT IS NOT UNUSUAL TO FEEL LIGHT-HEADED OR FAINT, UP TO 24 HOURS AFTER SURGERY OR WHILE TAKING PAIN MEDICATION.  IF YOU HAVE THESE SYMPTOMS; SIT FOR A FEW MINUTES BEFORE STANDING AND HAVE SOMEONE ASSIST YOU WHEN YOU GET UP TO WALK OR USE THE BATHROOM.    YOU SHOULD REST AND RELAX FOR THE NEXT 24 HOURS AND YOU MUST MAKE ARRANGEMENTS TO HAVE SOMEONE STAY WITH YOU FOR AT LEAST 24 HOURS AFTER YOUR DISCHARGE.  AVOID HAZARDOUS AND STRENUOUS ACTIVITIES.  DO NOT MAKE IMPORTANT DECISIONS FOR 24 HOURS.    DO NOT DRIVE ANY VEHICLE OR OPERATE MECHANICAL EQUIPMENT FOR 24 HOURS FOLLOWING THE END OF YOUR SURGERY.  EVEN THOUGH YOU MAY FEEL NORMAL, YOUR REACTIONS MAY BE  AFFECTED BY THE MEDICATION YOU HAVE RECEIVED.    DO NOT DRINK ALCOHOLIC BEVERAGES FOR 24 HOURS FOLLOWING YOUR SURGERY.    DRINK CLEAR LIQUIDS (APPLE JUICE, GINGER ALE, 7-UP, BROTH, ETC.).  PROGRESS TO YOUR REGULAR DIET AS YOU FEEL ABLE.    YOU MAY HAVE A DRY MOUTH, A SORE THROAT, MUSCLES ACHES OR TROUBLE SLEEPING.  THESE SHOULD GO AWAY AFTER 24 HOURS.    CALL YOUR DOCTOR FOR ANY OF THE FOLLOWING:  SIGNS OF INFECTION (FEVER, GROWING TENDERNESS AT THE SURGERY SITE, A LARGE AMOUNT OF DRAINAGE OR BLEEDING, SEVERE PAIN, FOUL-SMELLING DRAINAGE, REDNESS OR SWELLING.    IT HAS BEEN OVER 8 TO 10 HOURS SINCE SURGERY AND YOU ARE STILL NOT ABLE TO URINATE (PASS WATER).       MEDICATIONS YOU RECEIVED TODAY:  You received Toradol, an IV form of ibuprofen (Motrin) at 11:00am.  Do not take any ibuprofen products until 5:00pm.

## 2018-05-02 NOTE — IP AVS SNAPSHOT
MRN:[id number]                      After Visit Summary   5/2/2018    Emile Wood    MRN: [id number]           Thank you!     Thank you for choosing [facility] for your care. Our goal is always to provide you with excellent care. Hearing back from our patients is one way we can continue to improve our services. Please take a few minutes to complete the written survey that you may receive in the mail after you visit. If you would like to speak to someone directly about your visit please contact Patient Relations at [phone]. Thank you!          Patient Information     Date Of Birth          1950        About your hospital stay     You were admitted on:  May 2, 2018 You last received care in the:  [facility] PreOP/PostOP    You were discharged on:  May 2, 2018       Who to Call     For medical emergencies, please call [phone].  For non-urgent questions about your medical care, please call your primary care provider or clinic, [phone]  For questions related to your surgery, please call your surgery clinic        Attending Provider     Provider Specialty    John Lara MD Gastroenterology       Primary Care Provider Office Phone # Fax #    Luciano Hogan [phone]      Further instructions from your care team       DR. JOHN LARA M.D.     CLINIC PHONE NUMBER:  [phone]      ESOPHAGOGASTRODUODENOSCOPY DISCHARGE INSTRUCTIONS    You may not drive, use heavy equipment or consume alcohol for 24 hours because the drugs you were given may cause dizziness, drowsiness, forgetfulness and slower reaction time.    Small pieces or tissue (biopsies) or polyps may have been removed.    You may resume your regular diet and medications. Exception: If you had a biopsy or polypectomy, do not take aspirin, aleve (naproxen) or ibuprofen for the next 10 days.  Tylenol (acetaminophen) is safe to take.    Additional instructions:  If you had a biopsy or  polypectomy, the pathology report will be sent to your doctor.  If you have not received the results within 10 days, call your doctor's office.    What to watch for:  Problems rarely occur after the procedure.  It is important for you to be aware of the early signs of a possible complication.  Call immediately if you notice any of the followin.  Unusual pain or difficulty swallowing.    2.  Unusual abdominal or chest pain.    3.  Vomiting of blood.    4.  Black or bloody stools.    5.  Temperature above 100.6 degrees F       GENERAL ANESTHESIA OR SEDATION ADULT DISCHARGE INSTRUCTIONS   SPECIAL PRECAUTIONS FOR 24 HOURS AFTER SURGERY    IT IS NOT UNUSUAL TO FEEL LIGHT-HEADED OR FAINT, UP TO 24 HOURS AFTER SURGERY OR WHILE TAKING PAIN MEDICATION.  IF YOU HAVE THESE SYMPTOMS; SIT FOR A FEW MINUTES BEFORE STANDING AND HAVE SOMEONE ASSIST YOU WHEN YOU GET UP TO WALK OR USE THE BATHROOM.    YOU SHOULD REST AND RELAX FOR THE NEXT 24 HOURS AND YOU MUST MAKE ARRANGEMENTS TO HAVE SOMEONE STAY WITH YOU FOR AT LEAST 24 HOURS AFTER YOUR DISCHARGE.  AVOID HAZARDOUS AND STRENUOUS ACTIVITIES.  DO NOT MAKE IMPORTANT DECISIONS FOR 24 HOURS.    DO NOT DRIVE ANY VEHICLE OR OPERATE MECHANICAL EQUIPMENT FOR 24 HOURS FOLLOWING THE END OF YOUR SURGERY.  EVEN THOUGH YOU MAY FEEL NORMAL, YOUR REACTIONS MAY BE AFFECTED BY THE MEDICATION YOU HAVE RECEIVED.    DO NOT DRINK ALCOHOLIC BEVERAGES FOR 24 HOURS FOLLOWING YOUR SURGERY.    DRINK CLEAR LIQUIDS (APPLE JUICE, GINGER ALE, 7-UP, BROTH, ETC.).  PROGRESS TO YOUR REGULAR DIET AS YOU FEEL ABLE.    YOU MAY HAVE A DRY MOUTH, A SORE THROAT, MUSCLES ACHES OR TROUBLE SLEEPING.  THESE SHOULD GO AWAY AFTER 24 HOURS.    CALL YOUR DOCTOR FOR ANY OF THE FOLLOWING:  SIGNS OF INFECTION (FEVER, GROWING TENDERNESS AT THE SURGERY SITE, A LARGE AMOUNT OF DRAINAGE OR BLEEDING, SEVERE PAIN, FOUL-SMELLING DRAINAGE, REDNESS OR SWELLING.    IT HAS BEEN OVER 8 TO 10 HOURS SINCE SURGERY AND YOU ARE STILL NOT  "ABLE TO URINATE (PASS WATER).       MEDICATIONS YOU RECEIVED TODAY:  You received Toradol, an IV form of ibuprofen (Motrin) at 11:00am.  Do not take any ibuprofen products until 5:00pm.          Pending Results     Date and Time Order Name Status Description    2018 1237 Surgical pathology exam In process             Admission Information     Date & Time Provider Department Dept. Phone    2018 Link, John PUGA MD [facility] PreOP/PostOP [phone]      Your Vitals Were     Blood Pressure Pulse Temperature Respirations Height Weight    142/82 69 98.3  F (36.8  C) (Temporal) 14 1.651 m (5' 5\") 102.1 kg (225 lb)    Pulse Oximetry BMI (Body Mass Index)                99% 37.44 kg/m2          MyChart Information     [employer] lets you send messages to your doctor, view your test results, renew your prescriptions, schedule appointments and more. To sign up, go to [email or web address] . Click on \"Log in\" on the left side of the screen, which will take you to the Welcome page. Then click on \"Sign up Now\" on the right side of the page.     You will be asked to enter the access code listed below, as well as some personal information. Please follow the directions to create your username and password.     Your access code is: [email or web address]  Expires: 2018  1:31 PM     Your access code will  in 90 days. If you need help or a new code, please call your [facility] clinic or [phone].        Care EveryWhere ID     This is your Care EveryWhere ID. This could be used by other organizations to access your [facility] medical records  [id number]        Equal Access to Services     KVNG AIKEN : Hadii ivan Elizabeth, chente graham, qaumang grafalagata wallis. So [facility] [phone].    ATENCIÓN: Si habla español, tiene a jett disposición servicios gratuitos de asistencia lingüística. Llame al [phone].    We comply with applicable federal civil rights laws and " [place] laws. We do not discriminate on the basis of race, color, national origin, age, disability, sex, sexual orientation, or gender identity.               Review of your medicines      UNREVIEWED medicines. Ask your doctor about these medicines        Dose / Directions    ALLOPURINOL PO        Dose:  300 mg   Take 300 mg by mouth daily.   Refills:  0       AMLODIPINE BESYLATE PO        Dose:  10 mg   Take 10 mg by mouth daily.   Refills:  0       ASPIRIN PO        Dose:  81 mg   Take 81 mg by mouth   Refills:  0       atorvastatin 40 MG tablet   Commonly known as:  LIPITOR        Dose:  80 mg   Take 80 mg by mouth daily   Refills:  0       COQ10 PO        Dose:  200 mg   Take 200 mg by mouth daily   Refills:  0       LYRICA PO        Dose:  200 mg   Take 200 mg by mouth 3 times daily 2 tablets twice a day   Refills:  0       METOPROLOL TARTRATE PO        Dose:  50 mg   Take 50 mg by mouth 2 times daily.   Refills:  0       nitroGLYcerin 0.4 MG sublingual tablet   Commonly known as:  NITROSTAT        Dose:  0.4 mg   Place 1 tablet under the tongue every 5 minutes as needed for chest pain (CALL 911 IF NOT IMPROVED AFTER THREE CONSECUTIVE DOSES).   Quantity:  25 tablet   Refills:  0       PERCOCET  MG per tablet   Generic drug:  oxyCODONE-acetaminophen        Dose:  1 tablet   Take 1 tablet by mouth daily   Refills:  0       PLAVIX PO        Dose:  75 mg   Take 75 mg by mouth daily   Refills:  0       VITAMIN B COMPLEX PO        Dose:  340 mg   Take 340 mg by mouth.   Refills:  0       VITAMIN D3 PO        Dose:  1000 Units   Take 1,000 Units by mouth daily.   Refills:  0                Protect others around you: Learn how to safely use, store and throw away your medicines at www.disposemymeds.org.             Medication List: This is a list of all your medications and when to take them. Check marks below indicate your daily home schedule. Keep this list as a reference.      Medications           Morning  Afternoon Evening Bedtime As Needed    ALLOPURINOL PO   Take 300 mg by mouth daily.                                AMLODIPINE BESYLATE PO   Take 10 mg by mouth daily.                                ASPIRIN PO   Take 81 mg by mouth                                atorvastatin 40 MG tablet   Commonly known as:  LIPITOR   Take 80 mg by mouth daily                                COQ10 PO   Take 200 mg by mouth daily                                LYRICA PO   Take 200 mg by mouth 3 times daily 2 tablets twice a day                                METOPROLOL TARTRATE PO   Take 50 mg by mouth 2 times daily.                                nitroGLYcerin 0.4 MG sublingual tablet   Commonly known as:  NITROSTAT   Place 1 tablet under the tongue every 5 minutes as needed for chest pain (CALL 911 IF NOT IMPROVED AFTER THREE CONSECUTIVE DOSES).                                PERCOCET  MG per tablet   Take 1 tablet by mouth daily   Generic drug:  oxyCODONE-acetaminophen                                PLAVIX PO   Take 75 mg by mouth daily                                VITAMIN B COMPLEX PO   Take 340 mg by mouth.                                VITAMIN D3 PO   Take 1,000 Units by mouth daily.

## 2018-05-02 NOTE — IP AVS SNAPSHOT
[facility] PreOP/PostOP    [address]    [facility] [address]    Phone:  [phone]    Fax:  [phone]                                       After Visit Summary   5/2/2018    Emile Wood    MRN: [id number]           After Visit Summary Signature Page     I have received my discharge instructions, and my questions have been answered. I have discussed any challenges I see with this plan with the nurse or doctor.    ..........................................................................................................................................  Patient/Patient Representative Signature      ..........................................................................................................................................  Patient Representative Print Name and Relationship to Patient    ..................................................               ................................................  Date                                            Time    ..........................................................................................................................................  Reviewed by Signature/Title    ...................................................              ..............................................  Date                                                            Time

## 2018-05-02 NOTE — ANESTHESIA CARE TRANSFER NOTE
Patient: Emile Wood    Procedure(s):  ESOPHAGOSCOPY, GASTROSCOPY, DUODENOSCOPY (EGD) WITH BIOPSIES AND POLYPECTOMY, COLONOSCOPY ([facility]) WITH POLYPECTOMIES - Wound Class: II-Clean Contaminated   - Wound Class: II-Clean Contaminated    Diagnosis: Anemia  Diagnosis Additional Information: No value filed.    Anesthesia Type:   General, ETT     Note:  Airway :Face Mask  Patient transferred to:PACU  Handoff Report: Identifed the Patient, Identified the Reponsible Provider, Reviewed the pertinent medical history, Discussed the surgical course, Reviewed Intra-OP anesthesia mangement and issues during anesthesia, Set expectations for post-procedure period and Allowed opportunity for questions and acknowledgement of understanding      Vitals: (Last set prior to Anesthesia Care Transfer)    CRNA VITALS  5/2/2018 1307 - 5/2/2018 1337      5/2/2018             Pulse: 76    Temp: 97.2  F (36.2  C)    SpO2: 97 %    Resp Rate (observed): (!)  1    EKG: NSR                Electronically Signed By: KARLA Arias CRNA  May 2, 2018  1:37 PM

## 2018-05-02 NOTE — ANESTHESIA POSTPROCEDURE EVALUATION
Patient: Emile Wood    Procedure(s):  ESOPHAGOSCOPY, GASTROSCOPY, DUODENOSCOPY (EGD) WITH BIOPSIES AND POLYPECTOMY, COLONOSCOPY ([facility]) WITH POLYPECTOMIES - Wound Class: II-Clean Contaminated   - Wound Class: II-Clean Contaminated    Diagnosis:Anemia  Diagnosis Additional Information: Anemia  Dr. Vilchis    Anesthesia Type:  General, ETT    Note:  Anesthesia Post Evaluation    Patient location during evaluation: PACU  Patient participation: Able to fully participate in evaluation  Level of consciousness: awake  Pain management: adequate  Airway patency: patent  Cardiovascular status: acceptable  Respiratory status: acceptable  Hydration status: acceptable  PONV: none             Last vitals:  Vitals:    05/02/18 1340 05/02/18 1345 05/02/18 1350   BP: 156/81 (!) 145/96 142/76   Pulse:      Resp: 11 12 15   Temp:   100.7  F (38.2  C)   SpO2: 100% 98% 99%         Electronically Signed By: Fidel Paige MD  May 2, 2018  1:57 PM

## 2018-05-03 LAB — INTERPRETATION ECG - MUSE: NORMAL

## 2018-05-07 LAB — COPATH REPORT: NORMAL

## 2022-09-13 ENCOUNTER — TRANSFERRED RECORDS (OUTPATIENT)
Dept: HEALTH INFORMATION MANAGEMENT | Facility: CLINIC | Age: 72
End: 2022-09-13

## 2022-10-12 RX ORDER — AMIODARONE HYDROCHLORIDE 200 MG/1
200 TABLET ORAL EVERY MORNING
COMMUNITY
Start: 2022-02-23

## 2022-10-12 RX ORDER — SILDENAFIL 100 MG/1
100 TABLET, FILM COATED ORAL DAILY PRN
COMMUNITY
Start: 2022-01-03

## 2022-10-12 RX ORDER — FUROSEMIDE 20 MG
60 TABLET ORAL EVERY MORNING
Status: ON HOLD | COMMUNITY
Start: 2022-02-23 | End: 2024-01-02

## 2022-10-12 RX ORDER — LEVOTHYROXINE SODIUM 175 UG/1
175 TABLET ORAL EVERY MORNING
COMMUNITY
Start: 2022-02-15

## 2022-10-12 RX ORDER — OXYCODONE AND ACETAMINOPHEN 10; 325 MG/1; MG/1
1.5-2 TABLET ORAL 2 TIMES DAILY PRN
Status: ON HOLD | COMMUNITY
End: 2022-10-27

## 2022-10-12 RX ORDER — PREGABALIN 200 MG/1
CAPSULE ORAL
COMMUNITY
Start: 2022-09-08 | End: 2024-01-03

## 2022-10-12 RX ORDER — ATORVASTATIN CALCIUM 80 MG/1
80 TABLET, FILM COATED ORAL AT BEDTIME
Status: ON HOLD | COMMUNITY
Start: 2022-03-16 | End: 2023-12-27

## 2022-10-12 RX ORDER — AMLODIPINE BESYLATE 10 MG/1
10 TABLET ORAL DAILY
COMMUNITY
Start: 2022-02-21 | End: 2024-01-03

## 2022-10-12 RX ORDER — ALLOPURINOL 300 MG/1
300 TABLET ORAL EVERY MORNING
COMMUNITY
Start: 2022-09-30

## 2022-10-12 RX ORDER — ACETAMINOPHEN 325 MG/1
650 TABLET ORAL PRN
COMMUNITY
Start: 2021-05-27

## 2022-10-25 ENCOUNTER — LAB (OUTPATIENT)
Dept: LAB | Facility: CLINIC | Age: 72
End: 2022-10-25
Attending: NEUROLOGICAL SURGERY
Payer: MEDICARE

## 2022-10-25 DIAGNOSIS — Z01.812 PRE-OPERATIVE LABORATORY EXAMINATION: ICD-10-CM

## 2022-10-25 PROCEDURE — U0003 INFECTIOUS AGENT DETECTION BY NUCLEIC ACID (DNA OR RNA); SEVERE ACUTE RESPIRATORY SYNDROME CORONAVIRUS 2 (SARS-COV-2) (CORONAVIRUS DISEASE [COVID-19]), AMPLIFIED PROBE TECHNIQUE, MAKING USE OF HIGH THROUGHPUT TECHNOLOGIES AS DESCRIBED BY CMS-2020-01-R: HCPCS

## 2022-10-25 PROCEDURE — U0005 INFEC AGEN DETEC AMPLI PROBE: HCPCS

## 2022-10-26 LAB — SARS-COV-2 RNA RESP QL NAA+PROBE: NEGATIVE

## 2022-10-27 ENCOUNTER — APPOINTMENT (OUTPATIENT)
Dept: GENERAL RADIOLOGY | Facility: CLINIC | Age: 72
DRG: 454 | End: 2022-10-27
Attending: NEUROLOGICAL SURGERY
Payer: MEDICARE

## 2022-10-27 ENCOUNTER — HOSPITAL ENCOUNTER (INPATIENT)
Facility: CLINIC | Age: 72
LOS: 2 days | Discharge: HOME | DRG: 454 | End: 2022-10-29
Attending: NEUROLOGICAL SURGERY | Admitting: NEUROLOGICAL SURGERY
Payer: MEDICARE

## 2022-10-27 ENCOUNTER — ANESTHESIA EVENT (OUTPATIENT)
Dept: SURGERY | Facility: CLINIC | Age: 72
DRG: 454 | End: 2022-10-27
Payer: MEDICARE

## 2022-10-27 ENCOUNTER — ANESTHESIA (OUTPATIENT)
Dept: SURGERY | Facility: CLINIC | Age: 72
DRG: 454 | End: 2022-10-27
Payer: MEDICARE

## 2022-10-27 DIAGNOSIS — Z98.1 S/P LUMBAR FUSION: Primary | ICD-10-CM

## 2022-10-27 LAB
CREAT SERPL-MCNC: 0.68 MG/DL (ref 0.67–1.17)
GFR SERPL CREATININE-BSD FRML MDRD: >90 ML/MIN/1.73M2

## 2022-10-27 PROCEDURE — 250N000009 HC RX 250: Performed by: NEUROLOGICAL SURGERY

## 2022-10-27 PROCEDURE — 01NB0ZZ RELEASE LUMBAR NERVE, OPEN APPROACH: ICD-10-PCS | Performed by: NEUROLOGICAL SURGERY

## 2022-10-27 PROCEDURE — 120N000001 HC R&B MED SURG/OB

## 2022-10-27 PROCEDURE — 258N000003 HC RX IP 258 OP 636: Performed by: NURSE ANESTHETIST, CERTIFIED REGISTERED

## 2022-10-27 PROCEDURE — 0ST20ZZ RESECTION OF LUMBAR VERTEBRAL DISC, OPEN APPROACH: ICD-10-PCS | Performed by: NEUROLOGICAL SURGERY

## 2022-10-27 PROCEDURE — 8E0WXBZ COMPUTER ASSISTED PROCEDURE OF TRUNK REGION: ICD-10-PCS | Performed by: NEUROLOGICAL SURGERY

## 2022-10-27 PROCEDURE — 0SG0071 FUSION OF LUMBAR VERTEBRAL JOINT WITH AUTOLOGOUS TISSUE SUBSTITUTE, POSTERIOR APPROACH, POSTERIOR COLUMN, OPEN APPROACH: ICD-10-PCS | Performed by: NEUROLOGICAL SURGERY

## 2022-10-27 PROCEDURE — 250N000005 HC OR RX SURGIFLO HEMOSTATIC MATRIX 10ML 199102S OPNP: Performed by: NEUROLOGICAL SURGERY

## 2022-10-27 PROCEDURE — 250N000011 HC RX IP 250 OP 636: Performed by: ANESTHESIOLOGY

## 2022-10-27 PROCEDURE — 250N000011 HC RX IP 250 OP 636: Performed by: NURSE PRACTITIONER

## 2022-10-27 PROCEDURE — 370N000017 HC ANESTHESIA TECHNICAL FEE, PER MIN: Performed by: NEUROLOGICAL SURGERY

## 2022-10-27 PROCEDURE — 272N000002 HC OR SUPPLY OTHER OPNP: Performed by: NEUROLOGICAL SURGERY

## 2022-10-27 PROCEDURE — 0SG00AJ FUSION OF LUMBAR VERTEBRAL JOINT WITH INTERBODY FUSION DEVICE, POSTERIOR APPROACH, ANTERIOR COLUMN, OPEN APPROACH: ICD-10-PCS | Performed by: NEUROLOGICAL SURGERY

## 2022-10-27 PROCEDURE — 250N000013 HC RX MED GY IP 250 OP 250 PS 637: Performed by: ANESTHESIOLOGY

## 2022-10-27 PROCEDURE — 36415 COLL VENOUS BLD VENIPUNCTURE: CPT | Performed by: ANESTHESIOLOGY

## 2022-10-27 PROCEDURE — 250N000013 HC RX MED GY IP 250 OP 250 PS 637: Performed by: NURSE PRACTITIONER

## 2022-10-27 PROCEDURE — 250N000011 HC RX IP 250 OP 636: Performed by: NEUROLOGICAL SURGERY

## 2022-10-27 PROCEDURE — 710N000010 HC RECOVERY PHASE 1, LEVEL 2, PER MIN: Performed by: NEUROLOGICAL SURGERY

## 2022-10-27 PROCEDURE — 999N000179 XR SURGERY CARM FLUORO LESS THAN 5 MIN W STILLS: Mod: TC

## 2022-10-27 PROCEDURE — C1713 ANCHOR/SCREW BN/BN,TIS/BN: HCPCS | Performed by: NEUROLOGICAL SURGERY

## 2022-10-27 PROCEDURE — 999N000141 HC STATISTIC PRE-PROCEDURE NURSING ASSESSMENT: Performed by: NEUROLOGICAL SURGERY

## 2022-10-27 PROCEDURE — C1762 CONN TISS, HUMAN(INC FASCIA): HCPCS | Performed by: NEUROLOGICAL SURGERY

## 2022-10-27 PROCEDURE — 258N000003 HC RX IP 258 OP 636: Performed by: NURSE PRACTITIONER

## 2022-10-27 PROCEDURE — 250N000009 HC RX 250: Performed by: NURSE ANESTHETIST, CERTIFIED REGISTERED

## 2022-10-27 PROCEDURE — 82565 ASSAY OF CREATININE: CPT | Performed by: ANESTHESIOLOGY

## 2022-10-27 PROCEDURE — 258N000003 HC RX IP 258 OP 636: Performed by: ANESTHESIOLOGY

## 2022-10-27 PROCEDURE — L8699 PROSTHETIC IMPLANT NOS: HCPCS | Performed by: NEUROLOGICAL SURGERY

## 2022-10-27 PROCEDURE — 272N000001 HC OR GENERAL SUPPLY STERILE: Performed by: NEUROLOGICAL SURGERY

## 2022-10-27 PROCEDURE — 250N000011 HC RX IP 250 OP 636: Performed by: NURSE ANESTHETIST, CERTIFIED REGISTERED

## 2022-10-27 PROCEDURE — 360N000085 HC SURGERY LEVEL 5 W/ FLUORO, PER MIN: Performed by: NEUROLOGICAL SURGERY

## 2022-10-27 DEVICE — GRAFT BN CANC 30CC CRSH 1-10MM 800104: Type: IMPLANTABLE DEVICE | Site: SPINE LUMBAR | Status: FUNCTIONAL

## 2022-10-27 DEVICE — GRAFT BONE INFUSE BMP SM 7510200: Type: IMPLANTABLE DEVICE | Site: SPINE LUMBAR | Status: FUNCTIONAL

## 2022-10-27 DEVICE — IMPLANTABLE DEVICE: Type: IMPLANTABLE DEVICE | Site: SPINE LUMBAR | Status: FUNCTIONAL

## 2022-10-27 RX ORDER — POLYETHYLENE GLYCOL 3350 17 G/17G
17 POWDER, FOR SOLUTION ORAL DAILY
Status: DISCONTINUED | OUTPATIENT
Start: 2022-10-28 | End: 2022-10-29 | Stop reason: HOSPADM

## 2022-10-27 RX ORDER — METHOCARBAMOL 500 MG/1
500 TABLET, FILM COATED ORAL 3 TIMES DAILY PRN
Qty: 40 TABLET | Refills: 0 | Status: SHIPPED | OUTPATIENT
Start: 2022-10-27 | End: 2023-10-10

## 2022-10-27 RX ORDER — ACETAMINOPHEN 325 MG/1
975 TABLET ORAL EVERY 8 HOURS
Status: DISCONTINUED | OUTPATIENT
Start: 2022-10-27 | End: 2022-10-29 | Stop reason: HOSPADM

## 2022-10-27 RX ORDER — GLYCOPYRROLATE 0.2 MG/ML
INJECTION, SOLUTION INTRAMUSCULAR; INTRAVENOUS PRN
Status: DISCONTINUED | OUTPATIENT
Start: 2022-10-27 | End: 2022-10-27

## 2022-10-27 RX ORDER — SODIUM CHLORIDE, SODIUM LACTATE, POTASSIUM CHLORIDE, CALCIUM CHLORIDE 600; 310; 30; 20 MG/100ML; MG/100ML; MG/100ML; MG/100ML
INJECTION, SOLUTION INTRAVENOUS CONTINUOUS PRN
Status: DISCONTINUED | OUTPATIENT
Start: 2022-10-27 | End: 2022-10-27

## 2022-10-27 RX ORDER — ALLOPURINOL 300 MG/1
300 TABLET ORAL DAILY
Status: DISCONTINUED | OUTPATIENT
Start: 2022-10-28 | End: 2022-10-29 | Stop reason: HOSPADM

## 2022-10-27 RX ORDER — BUPIVACAINE HYDROCHLORIDE AND EPINEPHRINE 5; 5 MG/ML; UG/ML
INJECTION, SOLUTION EPIDURAL; INTRACAUDAL; PERINEURAL PRN
Status: DISCONTINUED | OUTPATIENT
Start: 2022-10-27 | End: 2022-10-27 | Stop reason: HOSPADM

## 2022-10-27 RX ORDER — ONDANSETRON 4 MG/1
4 TABLET, ORALLY DISINTEGRATING ORAL EVERY 30 MIN PRN
Status: DISCONTINUED | OUTPATIENT
Start: 2022-10-27 | End: 2022-10-27 | Stop reason: HOSPADM

## 2022-10-27 RX ORDER — NITROGLYCERIN 0.4 MG/1
0.4 TABLET SUBLINGUAL EVERY 5 MIN PRN
Status: DISCONTINUED | OUTPATIENT
Start: 2022-10-27 | End: 2022-10-29 | Stop reason: HOSPADM

## 2022-10-27 RX ORDER — ONDANSETRON 2 MG/ML
4 INJECTION INTRAMUSCULAR; INTRAVENOUS EVERY 6 HOURS PRN
Status: DISCONTINUED | OUTPATIENT
Start: 2022-10-27 | End: 2022-10-27

## 2022-10-27 RX ORDER — LABETALOL 20 MG/4 ML (5 MG/ML) INTRAVENOUS SYRINGE
PRN
Status: DISCONTINUED | OUTPATIENT
Start: 2022-10-27 | End: 2022-10-27

## 2022-10-27 RX ORDER — LIDOCAINE HYDROCHLORIDE 10 MG/ML
INJECTION, SOLUTION INFILTRATION; PERINEURAL PRN
Status: DISCONTINUED | OUTPATIENT
Start: 2022-10-27 | End: 2022-10-27

## 2022-10-27 RX ORDER — HYDROMORPHONE HYDROCHLORIDE 1 MG/ML
0.5 INJECTION, SOLUTION INTRAMUSCULAR; INTRAVENOUS; SUBCUTANEOUS
Status: DISCONTINUED | OUTPATIENT
Start: 2022-10-27 | End: 2022-10-29 | Stop reason: HOSPADM

## 2022-10-27 RX ORDER — SODIUM CHLORIDE, SODIUM LACTATE, POTASSIUM CHLORIDE, CALCIUM CHLORIDE 600; 310; 30; 20 MG/100ML; MG/100ML; MG/100ML; MG/100ML
INJECTION, SOLUTION INTRAVENOUS CONTINUOUS
Status: DISCONTINUED | OUTPATIENT
Start: 2022-10-27 | End: 2022-10-27 | Stop reason: HOSPADM

## 2022-10-27 RX ORDER — ONDANSETRON 4 MG/1
4 TABLET, ORALLY DISINTEGRATING ORAL EVERY 6 HOURS PRN
Status: DISCONTINUED | OUTPATIENT
Start: 2022-10-27 | End: 2022-10-27

## 2022-10-27 RX ORDER — AMOXICILLIN 250 MG
1 CAPSULE ORAL 2 TIMES DAILY
Status: DISCONTINUED | OUTPATIENT
Start: 2022-10-27 | End: 2022-10-27

## 2022-10-27 RX ORDER — ONDANSETRON 2 MG/ML
4 INJECTION INTRAMUSCULAR; INTRAVENOUS EVERY 6 HOURS PRN
Status: DISCONTINUED | OUTPATIENT
Start: 2022-10-27 | End: 2022-10-29 | Stop reason: HOSPADM

## 2022-10-27 RX ORDER — POLYETHYLENE GLYCOL 3350 17 G/17G
17 POWDER, FOR SOLUTION ORAL DAILY
Status: DISCONTINUED | OUTPATIENT
Start: 2022-10-28 | End: 2022-10-27

## 2022-10-27 RX ORDER — AMLODIPINE BESYLATE 10 MG/1
10 TABLET ORAL DAILY
Status: DISCONTINUED | OUTPATIENT
Start: 2022-10-28 | End: 2022-10-29 | Stop reason: HOSPADM

## 2022-10-27 RX ORDER — LIDOCAINE 40 MG/G
CREAM TOPICAL
Status: DISCONTINUED | OUTPATIENT
Start: 2022-10-27 | End: 2022-10-27 | Stop reason: HOSPADM

## 2022-10-27 RX ORDER — NEOSTIGMINE METHYLSULFATE 1 MG/ML
VIAL (ML) INJECTION PRN
Status: DISCONTINUED | OUTPATIENT
Start: 2022-10-27 | End: 2022-10-27

## 2022-10-27 RX ORDER — ONDANSETRON 4 MG/1
4 TABLET, ORALLY DISINTEGRATING ORAL EVERY 6 HOURS PRN
Status: DISCONTINUED | OUTPATIENT
Start: 2022-10-27 | End: 2022-10-29 | Stop reason: HOSPADM

## 2022-10-27 RX ORDER — AMIODARONE HYDROCHLORIDE 200 MG/1
200 TABLET ORAL DAILY
Status: DISCONTINUED | OUTPATIENT
Start: 2022-10-28 | End: 2022-10-29 | Stop reason: HOSPADM

## 2022-10-27 RX ORDER — DIPHENHYDRAMINE HCL 12.5MG/5ML
12.5 LIQUID (ML) ORAL EVERY 6 HOURS PRN
Status: DISCONTINUED | OUTPATIENT
Start: 2022-10-27 | End: 2022-10-29 | Stop reason: HOSPADM

## 2022-10-27 RX ORDER — BISACODYL 10 MG
10 SUPPOSITORY, RECTAL RECTAL DAILY PRN
Status: DISCONTINUED | OUTPATIENT
Start: 2022-10-27 | End: 2022-10-29 | Stop reason: HOSPADM

## 2022-10-27 RX ORDER — PREGABALIN 100 MG/1
200 CAPSULE ORAL 2 TIMES DAILY
Status: DISCONTINUED | OUTPATIENT
Start: 2022-10-27 | End: 2022-10-29 | Stop reason: HOSPADM

## 2022-10-27 RX ORDER — FAMOTIDINE 20 MG/1
20 TABLET, FILM COATED ORAL 2 TIMES DAILY
Status: DISCONTINUED | OUTPATIENT
Start: 2022-10-27 | End: 2022-10-29 | Stop reason: HOSPADM

## 2022-10-27 RX ORDER — PROCHLORPERAZINE MALEATE 5 MG
5 TABLET ORAL EVERY 6 HOURS PRN
Status: DISCONTINUED | OUTPATIENT
Start: 2022-10-27 | End: 2022-10-29 | Stop reason: HOSPADM

## 2022-10-27 RX ORDER — ATORVASTATIN CALCIUM 40 MG/1
80 TABLET, FILM COATED ORAL AT BEDTIME
Status: DISCONTINUED | OUTPATIENT
Start: 2022-10-27 | End: 2022-10-29 | Stop reason: HOSPADM

## 2022-10-27 RX ORDER — ACETAMINOPHEN 325 MG/1
650 TABLET ORAL EVERY 4 HOURS PRN
Status: DISCONTINUED | OUTPATIENT
Start: 2022-10-30 | End: 2022-10-29 | Stop reason: HOSPADM

## 2022-10-27 RX ORDER — HYDROMORPHONE HYDROCHLORIDE 2 MG/1
4 TABLET ORAL EVERY 4 HOURS PRN
Status: DISCONTINUED | OUTPATIENT
Start: 2022-10-27 | End: 2022-10-29 | Stop reason: HOSPADM

## 2022-10-27 RX ORDER — CEFAZOLIN SODIUM/WATER 2 G/20 ML
2 SYRINGE (ML) INTRAVENOUS SEE ADMIN INSTRUCTIONS
Status: DISCONTINUED | OUTPATIENT
Start: 2022-10-27 | End: 2022-10-27 | Stop reason: HOSPADM

## 2022-10-27 RX ORDER — HYDROXYZINE HYDROCHLORIDE 10 MG/1
10 TABLET, FILM COATED ORAL EVERY 6 HOURS PRN
Status: DISCONTINUED | OUTPATIENT
Start: 2022-10-27 | End: 2022-10-29

## 2022-10-27 RX ORDER — AMOXICILLIN 250 MG
1 CAPSULE ORAL 2 TIMES DAILY
Status: DISCONTINUED | OUTPATIENT
Start: 2022-10-27 | End: 2022-10-29 | Stop reason: HOSPADM

## 2022-10-27 RX ORDER — DEXAMETHASONE SODIUM PHOSPHATE 4 MG/ML
INJECTION, SOLUTION INTRA-ARTICULAR; INTRALESIONAL; INTRAMUSCULAR; INTRAVENOUS; SOFT TISSUE PRN
Status: DISCONTINUED | OUTPATIENT
Start: 2022-10-27 | End: 2022-10-27

## 2022-10-27 RX ORDER — NALOXONE HYDROCHLORIDE 0.4 MG/ML
0.4 INJECTION, SOLUTION INTRAMUSCULAR; INTRAVENOUS; SUBCUTANEOUS
Status: DISCONTINUED | OUTPATIENT
Start: 2022-10-27 | End: 2022-10-29 | Stop reason: HOSPADM

## 2022-10-27 RX ORDER — LIDOCAINE 40 MG/G
CREAM TOPICAL
Status: DISCONTINUED | OUTPATIENT
Start: 2022-10-27 | End: 2022-10-29 | Stop reason: HOSPADM

## 2022-10-27 RX ORDER — NALOXONE HYDROCHLORIDE 0.4 MG/ML
0.2 INJECTION, SOLUTION INTRAMUSCULAR; INTRAVENOUS; SUBCUTANEOUS
Status: DISCONTINUED | OUTPATIENT
Start: 2022-10-27 | End: 2022-10-29 | Stop reason: HOSPADM

## 2022-10-27 RX ORDER — FENTANYL CITRATE 50 UG/ML
INJECTION, SOLUTION INTRAMUSCULAR; INTRAVENOUS PRN
Status: DISCONTINUED | OUTPATIENT
Start: 2022-10-27 | End: 2022-10-27

## 2022-10-27 RX ORDER — CALCIUM CARBONATE 500 MG/1
500 TABLET, CHEWABLE ORAL 4 TIMES DAILY PRN
Status: DISCONTINUED | OUTPATIENT
Start: 2022-10-27 | End: 2022-10-29 | Stop reason: HOSPADM

## 2022-10-27 RX ORDER — METHOCARBAMOL 750 MG/1
750 TABLET, FILM COATED ORAL EVERY 6 HOURS PRN
Status: DISCONTINUED | OUTPATIENT
Start: 2022-10-27 | End: 2022-10-29 | Stop reason: HOSPADM

## 2022-10-27 RX ORDER — AMOXICILLIN 250 MG
2 CAPSULE ORAL 2 TIMES DAILY
Status: DISCONTINUED | OUTPATIENT
Start: 2022-10-27 | End: 2022-10-29 | Stop reason: HOSPADM

## 2022-10-27 RX ORDER — DIPHENHYDRAMINE HYDROCHLORIDE 50 MG/ML
12.5 INJECTION INTRAMUSCULAR; INTRAVENOUS EVERY 6 HOURS PRN
Status: DISCONTINUED | OUTPATIENT
Start: 2022-10-27 | End: 2022-10-29 | Stop reason: HOSPADM

## 2022-10-27 RX ORDER — KETAMINE HYDROCHLORIDE 10 MG/ML
INJECTION INTRAMUSCULAR; INTRAVENOUS PRN
Status: DISCONTINUED | OUTPATIENT
Start: 2022-10-27 | End: 2022-10-27

## 2022-10-27 RX ORDER — ONDANSETRON 2 MG/ML
4 INJECTION INTRAMUSCULAR; INTRAVENOUS EVERY 30 MIN PRN
Status: DISCONTINUED | OUTPATIENT
Start: 2022-10-27 | End: 2022-10-27 | Stop reason: HOSPADM

## 2022-10-27 RX ORDER — SODIUM CHLORIDE 9 MG/ML
INJECTION, SOLUTION INTRAVENOUS CONTINUOUS
Status: DISCONTINUED | OUTPATIENT
Start: 2022-10-27 | End: 2022-10-29 | Stop reason: HOSPADM

## 2022-10-27 RX ORDER — HYDROMORPHONE HCL IN WATER/PF 6 MG/30 ML
0.2 PATIENT CONTROLLED ANALGESIA SYRINGE INTRAVENOUS EVERY 5 MIN PRN
Status: DISCONTINUED | OUTPATIENT
Start: 2022-10-27 | End: 2022-10-27 | Stop reason: HOSPADM

## 2022-10-27 RX ORDER — FENTANYL CITRATE 50 UG/ML
25 INJECTION, SOLUTION INTRAMUSCULAR; INTRAVENOUS EVERY 5 MIN PRN
Status: DISCONTINUED | OUTPATIENT
Start: 2022-10-27 | End: 2022-10-27 | Stop reason: HOSPADM

## 2022-10-27 RX ORDER — CEFAZOLIN SODIUM 2 G/100ML
2 INJECTION, SOLUTION INTRAVENOUS EVERY 8 HOURS
Status: DISCONTINUED | OUTPATIENT
Start: 2022-10-27 | End: 2022-10-29 | Stop reason: HOSPADM

## 2022-10-27 RX ORDER — CEFAZOLIN SODIUM/WATER 2 G/20 ML
2 SYRINGE (ML) INTRAVENOUS
Status: COMPLETED | OUTPATIENT
Start: 2022-10-27 | End: 2022-10-27

## 2022-10-27 RX ORDER — OXYCODONE HYDROCHLORIDE 5 MG/1
5 TABLET ORAL EVERY 4 HOURS PRN
Status: DISCONTINUED | OUTPATIENT
Start: 2022-10-27 | End: 2022-10-27 | Stop reason: HOSPADM

## 2022-10-27 RX ORDER — ONDANSETRON 2 MG/ML
INJECTION INTRAMUSCULAR; INTRAVENOUS PRN
Status: DISCONTINUED | OUTPATIENT
Start: 2022-10-27 | End: 2022-10-27

## 2022-10-27 RX ORDER — PROPOFOL 10 MG/ML
INJECTION, EMULSION INTRAVENOUS PRN
Status: DISCONTINUED | OUTPATIENT
Start: 2022-10-27 | End: 2022-10-27

## 2022-10-27 RX ORDER — VANCOMYCIN HYDROCHLORIDE 1 G/20ML
INJECTION, POWDER, LYOPHILIZED, FOR SOLUTION INTRAVENOUS PRN
Status: DISCONTINUED | OUTPATIENT
Start: 2022-10-27 | End: 2022-10-27 | Stop reason: HOSPADM

## 2022-10-27 RX ORDER — HYDROMORPHONE HYDROCHLORIDE 2 MG/1
2-4 TABLET ORAL EVERY 6 HOURS PRN
Qty: 30 TABLET | Refills: 0 | Status: SHIPPED | OUTPATIENT
Start: 2022-10-27 | End: 2022-11-03

## 2022-10-27 RX ADMIN — Medication: at 16:56

## 2022-10-27 RX ADMIN — OXYCODONE HYDROCHLORIDE 5 MG: 5 TABLET ORAL at 16:22

## 2022-10-27 RX ADMIN — LABETALOL 20 MG/4 ML (5 MG/ML) INTRAVENOUS SYRINGE 10 MG: at 11:54

## 2022-10-27 RX ADMIN — FENTANYL CITRATE 25 MCG: 50 INJECTION, SOLUTION INTRAMUSCULAR; INTRAVENOUS at 13:59

## 2022-10-27 RX ADMIN — NEOSTIGMINE METHYLSULFATE 5 MG: 1 INJECTION, SOLUTION INTRAVENOUS at 13:55

## 2022-10-27 RX ADMIN — ROCURONIUM BROMIDE 50 MG: 50 INJECTION, SOLUTION INTRAVENOUS at 11:41

## 2022-10-27 RX ADMIN — ACETAMINOPHEN 975 MG: 325 TABLET, FILM COATED ORAL at 23:44

## 2022-10-27 RX ADMIN — SODIUM CHLORIDE, POTASSIUM CHLORIDE, SODIUM LACTATE AND CALCIUM CHLORIDE: 600; 310; 30; 20 INJECTION, SOLUTION INTRAVENOUS at 15:35

## 2022-10-27 RX ADMIN — SODIUM CHLORIDE, POTASSIUM CHLORIDE, SODIUM LACTATE AND CALCIUM CHLORIDE: 600; 310; 30; 20 INJECTION, SOLUTION INTRAVENOUS at 09:44

## 2022-10-27 RX ADMIN — FENTANYL CITRATE 25 MCG: 50 INJECTION, SOLUTION INTRAMUSCULAR; INTRAVENOUS at 14:52

## 2022-10-27 RX ADMIN — Medication 30 MG: at 11:47

## 2022-10-27 RX ADMIN — FENTANYL CITRATE 25 MCG: 50 INJECTION, SOLUTION INTRAMUSCULAR; INTRAVENOUS at 13:54

## 2022-10-27 RX ADMIN — SENNOSIDES AND DOCUSATE SODIUM 1 TABLET: 50; 8.6 TABLET ORAL at 20:01

## 2022-10-27 RX ADMIN — METHOCARBAMOL 750 MG: 750 TABLET ORAL at 16:30

## 2022-10-27 RX ADMIN — CEFAZOLIN SODIUM 2 G: 2 INJECTION, SOLUTION INTRAVENOUS at 20:02

## 2022-10-27 RX ADMIN — HYDROMORPHONE HYDROCHLORIDE 1 MG: 1 INJECTION, SOLUTION INTRAMUSCULAR; INTRAVENOUS; SUBCUTANEOUS at 12:12

## 2022-10-27 RX ADMIN — HYDROMORPHONE HYDROCHLORIDE 0.2 MG: 0.2 INJECTION, SOLUTION INTRAMUSCULAR; INTRAVENOUS; SUBCUTANEOUS at 16:18

## 2022-10-27 RX ADMIN — FENTANYL CITRATE 100 MCG: 50 INJECTION, SOLUTION INTRAMUSCULAR; INTRAVENOUS at 11:41

## 2022-10-27 RX ADMIN — FENTANYL CITRATE 25 MCG: 50 INJECTION, SOLUTION INTRAMUSCULAR; INTRAVENOUS at 15:28

## 2022-10-27 RX ADMIN — GLYCOPYRROLATE 0.8 MG: 0.2 INJECTION, SOLUTION INTRAMUSCULAR; INTRAVENOUS at 13:55

## 2022-10-27 RX ADMIN — DEXAMETHASONE SODIUM PHOSPHATE 4 MG: 4 INJECTION, SOLUTION INTRA-ARTICULAR; INTRALESIONAL; INTRAMUSCULAR; INTRAVENOUS; SOFT TISSUE at 11:41

## 2022-10-27 RX ADMIN — LIDOCAINE HYDROCHLORIDE 30 MG: 10 INJECTION, SOLUTION INFILTRATION; PERINEURAL at 11:41

## 2022-10-27 RX ADMIN — FENTANYL CITRATE 25 MCG: 50 INJECTION, SOLUTION INTRAMUSCULAR; INTRAVENOUS at 15:03

## 2022-10-27 RX ADMIN — ACETAMINOPHEN 975 MG: 325 TABLET, FILM COATED ORAL at 16:30

## 2022-10-27 RX ADMIN — PREGABALIN 200 MG: 100 CAPSULE ORAL at 21:18

## 2022-10-27 RX ADMIN — METHOCARBAMOL 750 MG: 750 TABLET ORAL at 21:39

## 2022-10-27 RX ADMIN — Medication 2 G: at 11:34

## 2022-10-27 RX ADMIN — Medication 20 MG: at 12:01

## 2022-10-27 RX ADMIN — SODIUM CHLORIDE, PRESERVATIVE FREE: 5 INJECTION INTRAVENOUS at 18:04

## 2022-10-27 RX ADMIN — GLYCOPYRROLATE 0.2 MG: 0.2 INJECTION, SOLUTION INTRAMUSCULAR; INTRAVENOUS at 11:41

## 2022-10-27 RX ADMIN — FENTANYL CITRATE 50 MCG: 50 INJECTION, SOLUTION INTRAMUSCULAR; INTRAVENOUS at 13:16

## 2022-10-27 RX ADMIN — HYDROMORPHONE HYDROCHLORIDE 4 MG: 2 TABLET ORAL at 21:39

## 2022-10-27 RX ADMIN — FAMOTIDINE 20 MG: 20 TABLET ORAL at 20:01

## 2022-10-27 RX ADMIN — PROPOFOL 150 MG: 10 INJECTION, EMULSION INTRAVENOUS at 11:41

## 2022-10-27 RX ADMIN — HYDROMORPHONE HYDROCHLORIDE 0.2 MG: 0.2 INJECTION, SOLUTION INTRAMUSCULAR; INTRAVENOUS; SUBCUTANEOUS at 15:34

## 2022-10-27 RX ADMIN — ATORVASTATIN CALCIUM 80 MG: 40 TABLET, FILM COATED ORAL at 21:18

## 2022-10-27 RX ADMIN — SODIUM CHLORIDE, POTASSIUM CHLORIDE, SODIUM LACTATE AND CALCIUM CHLORIDE: 600; 310; 30; 20 INJECTION, SOLUTION INTRAVENOUS at 10:38

## 2022-10-27 RX ADMIN — FENTANYL CITRATE 25 MCG: 50 INJECTION, SOLUTION INTRAMUSCULAR; INTRAVENOUS at 15:21

## 2022-10-27 RX ADMIN — HYDROMORPHONE HYDROCHLORIDE 0.2 MG: 0.2 INJECTION, SOLUTION INTRAMUSCULAR; INTRAVENOUS; SUBCUTANEOUS at 15:47

## 2022-10-27 RX ADMIN — SODIUM CHLORIDE, POTASSIUM CHLORIDE, SODIUM LACTATE AND CALCIUM CHLORIDE: 600; 310; 30; 20 INJECTION, SOLUTION INTRAVENOUS at 13:16

## 2022-10-27 RX ADMIN — ONDANSETRON HYDROCHLORIDE 4 MG: 2 INJECTION, SOLUTION INTRAVENOUS at 13:44

## 2022-10-27 RX ADMIN — HYDROXYZINE HYDROCHLORIDE 10 MG: 10 TABLET ORAL at 21:39

## 2022-10-27 RX ADMIN — SENNOSIDES AND DOCUSATE SODIUM 1 TABLET: 8.6; 5 TABLET ORAL at 21:22

## 2022-10-27 ASSESSMENT — ACTIVITIES OF DAILY LIVING (ADL)
ADLS_ACUITY_SCORE: 20

## 2022-10-27 ASSESSMENT — ENCOUNTER SYMPTOMS: DYSRHYTHMIAS: 1

## 2022-10-27 NOTE — ANESTHESIA PREPROCEDURE EVALUATION
Anesthesia Pre-Procedure Evaluation    Patient: Emile Wood   MRN: [id number] : 1950        Procedure : Procedure(s):  LUMBAR 2 - LUMBAR 3 TRANSFORAMINAL INTERBODY FUSION          Past Medical History:   Diagnosis Date     Arthritis     knee and back     CAD (coronary artery disease)      Chronic sinusitis      Gout, chronic      Heart murmur      Hypertension      Irregular heart beat     hx at fib with 3 cardioversions     Numbness and tingling     peripheral neuropathy     Pacemaker      Peripheral neuropathy      Stented coronary artery     stent and CABG     Thyroid disease       Past Surgical History:   Procedure Laterality Date     AORTIC VALVE REPLACEMENT           ARTHROPLASTY KNEE  2013    Procedure: ARTHROPLASTY KNEE;  LEFT TOTAL KNEE ARTHROPLASTY ;  Surgeon: Luciano Mcgraw MD;  Location: [facility] OR     cabg       CARDIAC SURGERY      CABG     COLONOSCOPY N/A 2018    Procedure: COLONOSCOPY;;  Surgeon: John Vilchis MD;  Location: [facility] OR     ESOPHAGOSCOPY, GASTROSCOPY, DUODENOSCOPY (EGD), COMBINED N/A 2018    Procedure: COMBINED ESOPHAGOSCOPY, GASTROSCOPY, DUODENOSCOPY (EGD);  ESOPHAGOSCOPY, GASTROSCOPY, DUODENOSCOPY WITH BIOPSIES AND POLYPECTOMY, COLONOSCOPY WITH POLYPECTOMIES;  Surgeon: John Vilchis MD;  Location: [facility] OR     ORTHOPEDIC SURGERY      hallie knee, L4, c5, shoulder      No Known Allergies   Social History     Tobacco Use     Smoking status: Former     Types: Cigarettes     Quit date:      Years since quittin.8     Smokeless tobacco: Former     Tobacco comments:     smoke cigars   Substance Use Topics     Alcohol use: Yes     Comment: daily- 4 beers, glass of wine, shot of whisky      Wt Readings from Last 1 Encounters:   10/27/22 101.2 kg (223 lb)        Anesthesia Evaluation            ROS/MED HX  ENT/Pulmonary:  - neg pulmonary ROS     Neurologic:     (+) peripheral neuropathy,     Cardiovascular:     (+) hypertension--CAD ---pacemaker,  dysrhythmias, a-fib, valvular problems/murmurs AVR 2021. Previous cardiac testing   Echo: Date: 2016 Results:  Interpretation Summary  Global and regional left ventricular function is normal with an EF of 60-65%.  Mild concentric wall thickening consistent with left ventricular hypertrophy  is present.  Grade II or moderate diastolic dysfunction.  Moderate aortic stenosis is present. The aortic valve area is 1.4 cm^2. The  peak aortic velocity is 3.6 m/sec. Mean gradient across the valve is 31 mmHg.  Mild aortic insufficiency is present.  Pulmonary artery systolic pressure cannot be assessed.  The inferior vena cava was normal in size with preserved respiratory  variability.  Estimated mean right atrial pressure is 3 mmHg.  Sinuses of Valsalva is mildly dilated at 3.9 cm.  Ascending aorta is mildly dilated at 4.2 cm.  This study was compared with the study from 1/22/2013. Aortic valve sclerosis  became moderately stenotic.  Stress Test: Date: Results:    ECG Reviewed: Date: Results:    Cath: Date: Results:      METS/Exercise Tolerance:     Hematologic:     (+) anemia,     Musculoskeletal:  - neg musculoskeletal ROS     GI/Hepatic:  - neg GI/hepatic ROS     Renal/Genitourinary:  - neg Renal ROS     Endo:     (+) thyroid problem, Obesity,     Psychiatric/Substance Use:  - neg psychiatric ROS     Infectious Disease:  - neg infectious disease ROS     Malignancy:       Other:            Physical Exam    Airway        Mallampati: III   TM distance: > 3 FB   Neck ROM: full   Mouth opening: > 3 cm    Respiratory Devices and Support         Dental       (+) missing      Cardiovascular   cardiovascular exam normal          Pulmonary   pulmonary exam normal                OUTSIDE LABS:  CBC:   Lab Results   Component Value Date    WBC 6.7 09/09/2014    WBC 6.0 01/22/2013    HGB 14.0 09/09/2014    HGB 11.2 (L) 05/01/2013    HCT 41.8 09/09/2014    HCT 43.0 01/22/2013     09/09/2014     01/22/2013     BMP:   Lab  Results   Component Value Date     04/29/2013     01/22/2013    POTASSIUM 3.5 05/02/2018    POTASSIUM 4.0 04/29/2013    CHLORIDE 106 04/29/2013    CHLORIDE 104 01/22/2013    CO2 25 04/29/2013    CO2 26 01/22/2013    BUN 22 04/29/2013    BUN 17 01/22/2013    CR 0.68 10/27/2022    CR 0.68 05/02/2018     (A) 09/09/2014     (A) 09/09/2014     COAGS:   Lab Results   Component Value Date    PTT 32 04/29/2013    INR 1.44 (H) 05/02/2013     POC: No results found for: BGM, HCG, HCGS  HEPATIC:   Lab Results   Component Value Date    ALBUMIN 4.3 02/15/2011    PROTTOTAL 7.5 02/15/2011    ALT 31 02/15/2011    AST 25 02/15/2011    ALKPHOS 71 02/15/2011    BILITOTAL 0.7 02/15/2011     OTHER:   Lab Results   Component Value Date    A1C 5.8 09/09/2014    CATE 9.3 04/29/2013    CRP 0.30 07/11/2005       Anesthesia Plan    ASA Status:  3      Anesthesia Type: General.     - Airway: ETT   Induction: Intravenous.   Maintenance: Balanced.   Techniques and Equipment:     - Lines/Monitors: 2nd IV     Consents    Anesthesia Plan(s) and associated risks, benefits, and realistic alternatives discussed. Questions answered and patient/representative(s) expressed understanding.    - Discussed:     - Discussed with:  Patient      - Extended Intubation/Ventilatory Support Discussed: No.      - Patient is DNR/DNI Status: No    Use of blood products discussed: No .     Postoperative Care    Pain management: IV analgesics, Oral pain medications, Multi-modal analgesia.   PONV prophylaxis: Ondansetron (or other 5HT-3), Dexamethasone or Solumedrol     Comments:                Karthik Manning MD

## 2022-10-27 NOTE — ANESTHESIA POSTPROCEDURE EVALUATION
Patient: Emile Wood    Procedure: Procedure(s):  LUMBAR 2 - LUMBAR 3 TRANSFORAMINAL INTERBODY FUSION       Anesthesia Type:  General    Note:  Disposition: Admission   Postop Pain Control: Uneventful            Sign Out: Well controlled pain   PONV: No   Neuro/Psych: Uneventful            Sign Out: Acceptable/Baseline neuro status   Airway/Respiratory: Uneventful            Sign Out: Acceptable/Baseline resp. status   CV/Hemodynamics: Uneventful            Sign Out: Acceptable CV status; No obvious hypovolemia; No obvious fluid overload   Other NRE: NONE   DID A NON-ROUTINE EVENT OCCUR? No           Last vitals:  Vitals Value Taken Time   /71 10/27/22 1620   Temp 97.9  F (36.6  C) 10/27/22 1600   Pulse 59 10/27/22 1634   Resp 10 10/27/22 1634   SpO2 99 % 10/27/22 1634   Vitals shown include unvalidated device data.    Electronically Signed By: Karthik Manning MD  October 27, 2022  4:35 PM

## 2022-10-27 NOTE — PHARMACY-ADMISSION MEDICATION HISTORY
Medication history and patient interview completed by pharmacy intern/student or pre-admitting RN.  Reviewed by pharmacist, including [facility] dispense records, [facility] Everywhere, and chart review.       Gian Burns, Pharm.D., [facility]      Medications Prior to Admission   Medication Sig Dispense Refill Last Dose     acetaminophen (TYLENOL) 325 MG tablet Take 650 mg by mouth as needed   10/27/2022 at 0630     allopurinol (ZYLOPRIM) 300 MG tablet Take 300 mg by mouth daily   10/26/2022 at 0700     amiodarone (PACERONE) 200 MG tablet Take 200 mg by mouth daily   10/27/2022 at 0630     amLODIPine (NORVASC) 10 MG tablet Take 10 mg by mouth daily   10/27/2022 at 0630     atorvastatin (LIPITOR) 80 MG tablet Take 80 mg by mouth At Bedtime   10/26/2022 at 2200     B Complex Vitamins (VITAMIN B COMPLEX PO) Take 1 tablet by mouth daily   10/20/2022     Calcium Carb-Cholecalciferol 600-800 MG-UNIT TABS Take 1 tablet by mouth daily   10/20/2022     furosemide (LASIX) 20 MG tablet Take 60 mg by mouth daily   10/26/2022 at 0700     levothyroxine (SYNTHROID/LEVOTHROID) 175 MCG tablet Take 175 mcg by mouth daily   10/27/2022 at 0630     Pregabalin (LYRICA) 200 MG capsule Take 2 capsules in the morning and  one capsule in the evening   10/27/2022 at 0630     sildenafil (VIAGRA) 100 MG tablet Take 100 mg by mouth daily as needed (sexual activity)   More than a month     vitamin B-12 (CYANOCOBALAMIN) 1000 MCG tablet Take 1,000 mcg by mouth daily   10/20/2022     nitroglycerin (NITROSTAT) 0.4 MG SL tablet Place 1 tablet under the tongue every 5 minutes as needed for chest pain (CALL 911 IF NOT IMPROVED AFTER THREE CONSECUTIVE DOSES). 25 tablet 0      [DISCONTINUED] apixaban ANTICOAGULANT (ELIQUIS) 5 MG tablet Take 1 tablet by mouth 2 times daily   10/22/2022     [DISCONTINUED] oxyCODONE-acetaminophen (PERCOCET)  MG per tablet Take 1.5-2 tablets by mouth 2 times daily as needed   10/26/2022 at 1700

## 2022-10-27 NOTE — PLAN OF CARE
Pt up from surgery at 1800. A/O x4. VSS. Pain 9/10 in back, PCA pump running at .2mg q 10 minutes. 1.2 mg max. Robaxin, PO dilaudid, and atarax given. Ice applied. 2L O2 NC. Dressing on back CDI. Total output in left BECKY: 90ml, right BECKY: 35ml. Serosanguinous drainage. Pt. Tolerating regular diet. Denies nausea. Bernal catheter in place. Cefazolin running q 8 hours. Will continue to provide supportive cares.

## 2022-10-27 NOTE — ANESTHESIA CARE TRANSFER NOTE
Patient: Emile Wood    Procedure: Procedure(s):  LUMBAR 2 - LUMBAR 3 TRANSFORAMINAL INTERBODY FUSION       Diagnosis: Spinal stenosis, lumbar region, without neurogenic claudication [M48.061]  Radiculopathy [M54.10]  Diagnosis Additional Information: No value filed.    Anesthesia Type:   General     Note:    Oropharynx: oropharynx clear of all foreign objects and spontaneously breathing  Level of Consciousness: awake  Oxygen Supplementation: face mask  Level of Supplemental Oxygen (L/min / FiO2): 6  Independent Airway: airway patency satisfactory and stable  Dentition: dentition unchanged  Vital Signs Stable: post-procedure vital signs reviewed and stable  Report to RN Given: handoff report given  Patient transferred to: PACU  Comments: Thick, green nasal and ETT secretions  Handoff Report: Identifed the Patient, Identified the Reponsible Provider, Reviewed the pertinent medical history, Discussed the surgical course, Reviewed Intra-OP anesthesia mangement and issues during anesthesia and Allowed opportunity for questions and acknowledgement of understanding      Vitals:  Vitals Value Taken Time   /95 10/27/22 1431   Temp     Pulse 71 10/27/22 1434   Resp 14 10/27/22 1434   SpO2 100 % 10/27/22 1434   Vitals shown include unvalidated device data.    Electronically Signed By: KARLA Ventura CRNA  October 27, 2022  2:36 PM

## 2022-10-28 ENCOUNTER — APPOINTMENT (OUTPATIENT)
Dept: PHYSICAL THERAPY | Facility: CLINIC | Age: 72
DRG: 454 | End: 2022-10-28
Attending: NEUROLOGICAL SURGERY
Payer: MEDICARE

## 2022-10-28 ENCOUNTER — APPOINTMENT (OUTPATIENT)
Dept: OCCUPATIONAL THERAPY | Facility: CLINIC | Age: 72
DRG: 454 | End: 2022-10-28
Attending: NEUROLOGICAL SURGERY
Payer: MEDICARE

## 2022-10-28 LAB — GLUCOSE BLDC GLUCOMTR-MCNC: 130 MG/DL (ref 70–99)

## 2022-10-28 PROCEDURE — 120N000001 HC R&B MED SURG/OB

## 2022-10-28 PROCEDURE — 97165 OT EVAL LOW COMPLEX 30 MIN: CPT | Mod: GO

## 2022-10-28 PROCEDURE — 97116 GAIT TRAINING THERAPY: CPT | Mod: GP

## 2022-10-28 PROCEDURE — 99222 1ST HOSP IP/OBS MODERATE 55: CPT | Performed by: PHYSICIAN ASSISTANT

## 2022-10-28 PROCEDURE — 97535 SELF CARE MNGMENT TRAINING: CPT | Mod: GO

## 2022-10-28 PROCEDURE — 97530 THERAPEUTIC ACTIVITIES: CPT | Mod: GP

## 2022-10-28 PROCEDURE — 250N000011 HC RX IP 250 OP 636: Performed by: NURSE PRACTITIONER

## 2022-10-28 PROCEDURE — 250N000013 HC RX MED GY IP 250 OP 250 PS 637: Performed by: NURSE PRACTITIONER

## 2022-10-28 PROCEDURE — 97161 PT EVAL LOW COMPLEX 20 MIN: CPT | Mod: GP

## 2022-10-28 PROCEDURE — 250N000013 HC RX MED GY IP 250 OP 250 PS 637: Performed by: PHYSICIAN ASSISTANT

## 2022-10-28 RX ORDER — DIAZEPAM 5 MG
5-10 TABLET ORAL EVERY 8 HOURS PRN
Status: DISCONTINUED | OUTPATIENT
Start: 2022-10-28 | End: 2022-10-29 | Stop reason: HOSPADM

## 2022-10-28 RX ADMIN — LEVOTHYROXINE SODIUM 175 MCG: 125 TABLET ORAL at 08:16

## 2022-10-28 RX ADMIN — METHOCARBAMOL 750 MG: 750 TABLET ORAL at 20:02

## 2022-10-28 RX ADMIN — PREGABALIN 200 MG: 100 CAPSULE ORAL at 08:16

## 2022-10-28 RX ADMIN — HYDROMORPHONE HYDROCHLORIDE 0.5 MG: 1 INJECTION, SOLUTION INTRAMUSCULAR; INTRAVENOUS; SUBCUTANEOUS at 23:02

## 2022-10-28 RX ADMIN — CEFAZOLIN SODIUM 2 G: 2 INJECTION, SOLUTION INTRAVENOUS at 03:32

## 2022-10-28 RX ADMIN — FAMOTIDINE 20 MG: 20 TABLET ORAL at 20:02

## 2022-10-28 RX ADMIN — AMLODIPINE BESYLATE 10 MG: 10 TABLET ORAL at 08:16

## 2022-10-28 RX ADMIN — SENNOSIDES AND DOCUSATE SODIUM 1 TABLET: 8.6; 5 TABLET ORAL at 08:15

## 2022-10-28 RX ADMIN — METHOCARBAMOL 750 MG: 750 TABLET ORAL at 12:58

## 2022-10-28 RX ADMIN — SENNOSIDES AND DOCUSATE SODIUM 1 TABLET: 8.6; 5 TABLET ORAL at 20:02

## 2022-10-28 RX ADMIN — POLYETHYLENE GLYCOL 3350 17 G: 17 POWDER, FOR SOLUTION ORAL at 08:22

## 2022-10-28 RX ADMIN — HYDROMORPHONE HYDROCHLORIDE 4 MG: 2 TABLET ORAL at 17:02

## 2022-10-28 RX ADMIN — METHOCARBAMOL 750 MG: 750 TABLET ORAL at 03:25

## 2022-10-28 RX ADMIN — HYDROMORPHONE HYDROCHLORIDE 4 MG: 2 TABLET ORAL at 08:40

## 2022-10-28 RX ADMIN — HYDROXYZINE HYDROCHLORIDE 10 MG: 10 TABLET ORAL at 03:25

## 2022-10-28 RX ADMIN — AMIODARONE HYDROCHLORIDE 200 MG: 200 TABLET ORAL at 08:16

## 2022-10-28 RX ADMIN — PREGABALIN 200 MG: 100 CAPSULE ORAL at 17:02

## 2022-10-28 RX ADMIN — CEFAZOLIN SODIUM 2 G: 2 INJECTION, SOLUTION INTRAVENOUS at 12:57

## 2022-10-28 RX ADMIN — ACETAMINOPHEN 975 MG: 325 TABLET, FILM COATED ORAL at 17:01

## 2022-10-28 RX ADMIN — DIAZEPAM 5 MG: 5 TABLET ORAL at 01:00

## 2022-10-28 RX ADMIN — ALLOPURINOL 300 MG: 300 TABLET ORAL at 08:16

## 2022-10-28 RX ADMIN — ATORVASTATIN CALCIUM 80 MG: 40 TABLET, FILM COATED ORAL at 22:03

## 2022-10-28 RX ADMIN — HYDROXYZINE HYDROCHLORIDE 10 MG: 10 TABLET ORAL at 23:02

## 2022-10-28 RX ADMIN — HYDROMORPHONE HYDROCHLORIDE 4 MG: 2 TABLET ORAL at 22:03

## 2022-10-28 RX ADMIN — HYDROMORPHONE HYDROCHLORIDE 4 MG: 2 TABLET ORAL at 12:58

## 2022-10-28 RX ADMIN — FUROSEMIDE 60 MG: 40 TABLET ORAL at 08:16

## 2022-10-28 RX ADMIN — CEFAZOLIN SODIUM 2 G: 2 INJECTION, SOLUTION INTRAVENOUS at 20:02

## 2022-10-28 RX ADMIN — FAMOTIDINE 20 MG: 20 TABLET ORAL at 08:16

## 2022-10-28 RX ADMIN — HYDROXYZINE HYDROCHLORIDE 10 MG: 10 TABLET ORAL at 17:02

## 2022-10-28 RX ADMIN — ACETAMINOPHEN 975 MG: 325 TABLET, FILM COATED ORAL at 08:16

## 2022-10-28 ASSESSMENT — ACTIVITIES OF DAILY LIVING (ADL)
ADLS_ACUITY_SCORE: 22
ADLS_ACUITY_SCORE: 21
ADLS_ACUITY_SCORE: 22
ADLS_ACUITY_SCORE: 21
ADLS_ACUITY_SCORE: 22
ADLS_ACUITY_SCORE: 22
ADLS_ACUITY_SCORE: 21

## 2022-10-28 NOTE — PROGRESS NOTES
10/28/22 1400   Appointment Info   Signing Clinician's Name / Credentials (OT) Katrina Thakkar, OTR/L   Living Environment   People in Home spouse   Current Living Arrangements house   Home Accessibility stairs to enter home;stairs within home   Number of Stairs, Main Entrance 3   Stair Railings, Main Entrance railing on left side (ascending)   Number of Stairs, Within Home, Primary seven   Stair Railings, Within Home, Primary railing on right side (ascending)   Transportation Anticipated family or friend will provide   Living Environment Comments pt has walk in shower, grab bars, and shower chair. Pt has comfort heigh toilets with grab bars next to.   Self-Care   Usual Activity Tolerance good   Current Activity Tolerance fair   Equipment Currently Used at Home other (see comments)   Fall history within last six months yes   Number of times patient has fallen within last six months 1   Activity/Exercise/Self-Care Comment pt was indep with ADLs and IADLs prior.   General Information   Onset of Illness/Injury or Date of Surgery 10/27/22   Referring Physician Fifi Shen, APRN CNP   Patient/Family Therapy Goal Statement (OT) return home with family   Additional Occupational Profile Info/Pertinent History of Current Problem Per chart: This patient is a 71 year old male who is POD #1 s/p L2-3 decompression revision and fusion.  Overall doing well, no complaints. Pain controlled.   Existing Precautions/Restrictions spinal;fall  (brace when out of bed)   General Observations and Info pt has AE and good home set up with good family support   Cognitive Status Examination   Cognitive Status Comments no cog impairment noted   Pain Assessment   Patient Currently in Pain Yes, see Vital Sign flowsheet  (5/10)   Bed Mobility   Bed Mobility supine-sit;sit-supine   Supine-Sit [place] (Bed Mobility) modified independence   Sit-Supine [place] (Bed Mobility) modified independence   Transfers   Transfers sit-stand  transfer;toilet transfer   Sit-Stand Transfer   Sit-Stand [place] (Transfers) supervision   Toilet Transfer   [place] Level (Toilet Transfer) supervision   Activities of Daily Living   BADL Assessment/Intervention lower body dressing;toileting   Lower Body Dressing Assessment/Training   [place] Level (Lower Body Dressing) supervision;verbal cues   Toileting   [place] Level (Toileting) contact guard assist   Clinical Impression   Criteria for Skilled Therapeutic Interventions Met (OT) Yes, treatment indicated   OT Diagnosis lumbar fusion   OT Problem List-Impairments impacting ADL activity tolerance impaired;balance;flexibility;mobility;post-surgical precautions;pain   Assessment of Occupational Performance 5 or more Performance Deficits   Identified Performance Deficits dressing, toileting, showering, IADLs, functional transfers, and functional mobility   Planned Therapy Interventions (OT) ADL retraining;IADL retraining;transfer training;progressive activity/exercise;home program guidelines   Clinical Decision Making Complexity (OT) low complexity   Anticipated Equipment Needs Upon Discharge (OT) hip kit;tub bench   Risk & Benefits of therapy have been explained evaluation/treatment results reviewed;care plan/treatment goals reviewed;risks/benefits reviewed;current/potential barriers reviewed;participants voiced agreement with care plan;participants included;patient   Clinical Impression Comments pt requires skilled OT during IP setting   OT Total Evaluation Time   OT Eval, Low Complexity Minutes (96956) 10   OT Goals   Therapy Frequency (OT) One time eval and treatment   OT Predicted Duration/Target Date for Goal Attainment 10/28/22   OT Goals Lower Body Dressing;Toilet Transfer/Toileting;OT Goal 1   OT: Lower Body Dressing Supervision/stand-by assist;Goal Met;using adaptive equipment;within precautions   OT: Toilet Transfer/Toileting Supervision/stand-by assist;toilet transfer;cleaning and  garment management;within precautions;Goal Met   OT: Goal 1 pt will verbalizes 3/3 spinal precautions. - goal met   Interventions   Interventions Quick Adds Self-Care/Home Management   Self-Care/Home Management   Self-Care/Home Mgmt/ADL, Compensatory, Meal Prep Minutes (73748) 24   Symptoms Noted During/After Treatment (Meal Preparation/Planning Training) fatigue   Treatment Detail/Skilled Intervention Upon arrival, pt was supine in bed. Pt was agreeable to OT. He is able to verbalize 3/3 spinal precautions. He reports he has reacher, long handled shoe horn and sock aide at home. He is educated on how he can use this AE for LB dressing and complying to precautions. Pt is insistent that he would need to bend to use a sock aide, so with encouragement, pt agreeable to trying sock aide. Pt was SBA with verbal cues with doffing and donning socks utilizing long handled shoe horn, sock aide, and reacher. Pt was very happy for trial with sock aide as he notes his sock aide is very different at home and he likes this one better. Pt is SBA with donning pants with verbal cues to comply to precautions while using reacher and 2ww when standing. Pt ambulates in room with 2ww and SBA for safety. Pt is CGA with toilet transfer from sit<>stand while utilizing grab bar and 2ww. Pt is educated in toilet aide/toilte tongs for wiping after bowel movement. Pt is also educated on bidet as option for hygiene aspects. Pt is educated on shower chairs including tub shower bench. Pt reports he has walk in shower with shower chair but may use tub shower with shower chair; OT educates on tub shower transfer utilizing shower chair to sit then swing legs in with demonstration. Pt verbalizes understanding.   OT Discharge Planning   OT Plan d/c from OT   OT Discharge Recommendation (DC Rec)   (defer to ortho)   OT Rationale for DC Rec Anticipate pt needing help with IADLs and SBA for functional mobility and ADLs. anticipate pt may need sock aide for  home.   OT Brief overview of current status SBA with functional mobility; SBA with toielting and LB dressing with AE.   Total Session Time   Timed Code Treatment Minutes 24   Total Session Time (sum of timed and untimed services) 34

## 2022-10-28 NOTE — PLAN OF CARE
Assumed care from [id number].    Vitals are Temp: 96.8  F (36  C) Temp src: Temporal BP: 134/60 Pulse: 61   Resp: 16 SpO2: 96 %.    Patient is Alert and Oriented x4. Anxious and restless at times. On 2L NC. Assist x2 with Gait Belt and Walker.  Pt is tolerating a Regular diet. Denies nausea. Complaining of back pain. PCA in place.  Also received tylenol, diazepam, atarax, and robaxin. Patient is Saline locked. 2x BECKY drain intact and draining bloody drainage. Back dressing CDI. Pt has baseline neuropathy to hands and feet. Bernal intact, to be discontinued in the AM. On cefazolin. Will continue to monitor.

## 2022-10-28 NOTE — PLAN OF CARE
Physical Therapy Discharge Summary    Reason for therapy discharge:    All goals and outcomes met, no further needs identified.    Progress towards therapy goal(s). See goals on Care Plan in [facility] electronic health record for goal details.  Goals met    Therapy recommendation(s):    No further therapy is recommended.

## 2022-10-28 NOTE — PLAN OF CARE
A&Ox4. IV SL. Lungs diminished. Baseline neuropathy in BLE. Drsg has a moderate amount of shadowing. JPs x2 both patent and to bulb suction.   On Ancef.     Patient vital signs are at baseline: Yes  Patient able to ambulate as they were prior to admission or with assist devices provided by therapies during their stay:  Yes; SBA with walker and gait belt; brace on when OOB.   Patient MUST void prior to discharge:  Yes; voiding in the bathroom or bedside urinal.   Patient able to tolerate oral intake:  Yes  Pain has adequate pain control using Oral analgesics:  Yes; taking PO Dilaudid, Atarax, and Robaxin.  Does patient have an identified :  Yes  Has goal D/C date and time been discussed with patient:  Yes     Goal Outcome Evaluation:      Plan of Care Reviewed With: patient    Overall Patient Progress: improving    Outcome Evaluation: Pt and wife anticipating discharge home tomorrow on 10/29/22.

## 2022-10-28 NOTE — PROGRESS NOTES
10/28/22 0900   Appointment Info   Signing Clinician's Name / Credentials (PT) Lucy Lopez DPT   Living Environment   People in Home spouse   Current Living Arrangements house   Home Accessibility stairs to enter home;stairs within home   Number of Stairs, Main Entrance 3   Stair Railings, Main Entrance railing on left side (ascending)   Number of Stairs, Within Home, Primary seven   Stair Railings, Within Home, Primary railing on right side (ascending)   Transportation Anticipated family or friend will provide   Living Environment Comments Split level house, 3 TATI with L rail, then 7 stairs to upper level. Walk in shower lower level, tub shower with chair and grab bars upper level.   Self-Care   Usual Activity Tolerance good   Current Activity Tolerance fair   Equipment Currently Used at Home other (see comments)  (walking stick)   Fall history within last six months yes   Number of times patient has fallen within last six months 1   Activity/Exercise/Self-Care Comment Pt reports using walking stick to walk the dog, otherwise IND at baseline but using more caution with back and knee pain. Wife home with pt.   General Information   Onset of Illness/Injury or Date of Surgery 10/27/22   Referring Physician Fifi Shen, KARLA CNP   Patient/Family Therapy Goals Statement (PT) return home   Pertinent History of Current Problem (include personal factors and/or comorbidities that impact the POC) Pt is 72 yo male who underwent Redo left L2-3 decompression with L2-3 TLIF.   Existing Precautions/Restrictions fall;brace worn when out of bed;spinal   Cognition   Affect/Mental Status (Cognition) WFL   Orientation Status (Cognition) oriented x 4   Follows Commands (Cognition) WFL   Pain Assessment   Patient Currently in Pain Yes, see Vital Sign flowsheet   Integumentary/Edema   Integumentary/Edema Comments spinal dressing intact   Posture    Posture Forward head position;Protracted shoulders;Kyphosis   Range of  Motion (ROM)   ROM Comment BLEs WFL   Strength (Manual Muscle Testing)   Strength Comments at least 3/5 with functional mobility   Bed Mobility   Comment, (Bed Mobility) supine<>sit with SBA   Transfers   Comment, (Transfers) sit<>stand with CGA and FWW   Gait/Stairs (Locomotion)   Distance in Feet (Required for LE Total Joints) 10   Comment, (Gait/Stairs) amb with FWW and CGA, slow gait but steady with walker   Balance   Balance Comments impaired; requiring use of walker   Sensory Examination   Sensory Perception patient reports no sensory changes   Clinical Impression   Criteria for Skilled Therapeutic Intervention Yes, treatment indicated   PT Diagnosis (PT) impaired functional mobility   Influenced by the following impairments weakness, post op status, spinal precautions, pain   Functional limitations due to impairments difficulty with bed mobility, transfers, ambulation, stairs   Clinical Presentation (PT Evaluation Complexity) Stable/Uncomplicated   Clinical Presentation Rationale clinical judgement   Clinical Decision Making (Complexity) low complexity   Planned Therapy Interventions (PT) balance training;bed mobility training;gait training;home exercise program;neuromuscular re-education;patient/family education;stair training;strengthening;transfer training;progressive activity/exercise;risk factor education;home program guidelines   Anticipated Equipment Needs at Discharge (PT)   (pt owns FWW)   Risk & Benefits of therapy have been explained evaluation/treatment results reviewed;care plan/treatment goals reviewed;risks/benefits reviewed;current/potential barriers reviewed;participants voiced agreement with care plan;participants included;patient   PT Total Evaluation Time   PT [place], Low Complexity Minutes (75804) 12   Plan of Care Review   Plan of Care Reviewed With patient   Physical Therapy Goals   PT Frequency 2x/day   PT Predicted Duration/Target Date for Goal Attainment 10/29/22   PT Goals Bed  Mobility;Transfers;Gait;Stairs   PT: Bed Mobility Independent;Supine to/from sit;Within precautions   PT: Transfers Modified independent;Sit to/from stand;Assistive device;Within precautions   PT: Gait Modified independent;Within precautions;Greater than 200 feet;Assistive device   PT: Stairs Supervision/stand-by assist;7 stairs;Assistive device;Within precautions;Rail on right   PT Discharge Planning   PT Plan PT: review spinal precautions, progress amb, progress stairs   PT Discharge Recommendation (DC Rec) home with assist   PT Rationale for DC Rec Patient is currently below baseline functional mobility. He is limited by pain, weakness, spinal precautions, and post op status. Patient is independent at baseline; currently is assist of 1 with use of walker. Anticipate with continued skilled PT services and medical clearance that pt will be safe for discharge home with assist of spouse as needed.   PT Brief overview of current status A x 1 with FWW   Total Session Time   Total Session Time (sum of timed and untimed services) 12

## 2022-10-28 NOTE — PLAN OF CARE
Occupational Therapy Discharge Summary    Reason for therapy discharge:    All goals and outcomes met, no further needs identified.    Progress towards therapy goal(s). See goals on Care Plan in [facility] electronic health record for goal details.  Goals met    Therapy recommendation(s):    No further therapy is recommended.

## 2022-10-28 NOTE — OP NOTE
OPERATIVE NOTE      Pre op Diagnosis:   1.Previous L2-5 decompressive laminectomies by Dr. Parker  2. Severe L2-3 DDD with complete collapse of disk space  3. Severe L2-3 foraminal stenosis  4. Chronic low back pain  5. RLE radiculopathy        Post op Diagnosis: Same      Procedure: Redo left L2-3 decompression with L2-3 TLIF  1. Reopening of previous incision with L2-3 decompressive laminectomies with facetectomies and decompression of the L2 exiting and L3 traversing roots bilaterally  2. L2-3 complete discectomy with arthrodesis and placement of a 8-12 x 26 mm Globus Caliber expandable interbody cage with locally harvested autologous bone and Medtronic Infuse placed centrally and anterior to the graft  3. Placement of Globus Creo JACOBSEN pedicle screws from L2-3 bilaterally   4. L2-3 posterior lateral fusion with Medtronic Infuse and crushed cancellous bone   5. Use of portable X-ray, Stealth and O-arm and intraoperative microscope       Surgeon: Aung Gallego MD, MS, FAANS    Assistant: Fifi Shen CNP and ST Marlon whose assistance was required throughout the case for positioning, exposure, retraction/suctioning during decompression, implant hardware placement, wound closure and transferred to postoperative bed.      Indication for procedure: The patient is a 71 year old male that presented to my clinic with the above clinical and radiographic findings. He had a previous L2-5 decompressive laminectomy by Dr. Parker. After reviewing the patient's imaging studies and examination, the decision was made to proceed with the above procedure. The patient understood the risks and benefits of surgery and wanted to proceed.      Description of Procedure: The patient was seen in the pre op area and the procedure was discussed with them and family once again and all questions were answered. The consent was then signed and the patients lumbar spine was marked with a marker. The patient was then transferred to  the operating suite on a stretcher and received general endotracheal anesthesia and a chadwick catheter placed and the patient was placed on the operating room table in the prone position with all pressure points padded. The portable X-ray was brought in the operating room for localization of the L2-3 levels. The back was then prepped and draped in a normal sterile fashion and the planned incision was marked and injected with local anesthesia. The incision was then reopened from L2-3 and dissection continued down through the subcutaneous tissue to the fascia. The fascia was then reopened and extended to the L2-3 interspaces with the Bovie cautery. Subperiosteal dissection was used to expose the spinous processes, bilateral lamina facet joints and transverse processes from L2-3. The Versa trac retractor was then placed and the exposure was complete and levels were verified. The Stealth and O-arm were then brought in the operating room and the reference frame was placed on the spinous process of L2. The O-arm was then brought into the field and the images were taken from L2-3 and transferred to the Stealth navigation system. Attention was then turned to the pedicle screw placement where the pedicle screws were placed from L2 to L3 using the normal technique of making a  hole with the Midas greg drill under navigation, the navigated thoracic probe was used to penetrate the pedicles and the pedicle screws were placed down the pedicles of L2 and L3 bilaterally under navigation.     Once the pedicle screws were placed, the operating microscope was brought into the field and attention then turned to the decompression. I then performed L2-3 complete bilateral laminectomies with facetectomies were performed using the Midas Greg drill, Kerrison rongeurs, pituitary rongeurs to expose the exiting L2 and traversing L3 roots in Kambin's triangle. The L2-3 disk was removed from the spinal canal and the roots were noted to be free  by palpation with the Moura ball hook. The size 7 to 8 mm endplate gemma and pituitary rongeurs were used to remove the disk in the interspace at L2-3 and to clean the endplates. The funnel was then used to place locally harvested autologous bone and Medtronic Infuse in the L2-3 interspace.  I then placed the 8-12 x 26 mm Globus Caliber interbody expandable cage in the L2-3 interspace and expanded to the appropriate size.       The wound was then irrigated with saline and hemostasis was obtained with bipolar cautery, gelfoam and Surgiflo. The connecting rods were then placed in the screws heads from L2-3 and the locking caps were the placed and secured with the counter torque system. Decortication of the lateral facet joints and transverse processes was performed from L2-3 with the Midas Greg drill and Medtronic Infuse and Crushed cancellous bone were placed for the posterior lateral fusion after another round of irrigation with saline. Two BECKY drains were placed subfascially and Vancomycin powder was then placed in the wound subfascially and the fascia was closed with 0 vicryl. Additional Vancomycin powder was then placed in the suprafascial wound and the subcutaneous tissue was closed with 2-0 and 3-0 vicryl and the skin was closed with Dermabond. The wound was then dressed appropriately and the patient was transferred back the the stretch, extubated and sent to the recovery room.      At the end of the case all counts were correct      Complications -  None      EBL - 50 ml    IV fluid - please see Anesthesia report      The patient received Ancef preoperatively and Vancomycin in the subfascial and suprafascial areas of the wound

## 2022-10-28 NOTE — PROGRESS NOTES
"[facility]    [facility] Orthopedics/Neurosurgery Progress Note    Date of Service (when I saw the patient): 10/28/2022     Assessment & Plan   Emile Wood is a 71 year old male who was admitted on 10/27/2022 with low back pain and RLE pain.  He had severe L2-3 degenerative disc with foraminal stenosis.  He underwent L2-3 TLIF with Dr. Aung Gallego on 10/27/22.  He states today his leg pain is improved.  He states his low back pain is manageable.  He has walked the hallways and is working with PT/OT.  He is tolerating diet and denies chest pain, dizziness, SOB.  He is eager to discharge to home and we discussed continued PT/OT today and if doing well and BECKY output decreases goal will be to discharge to home tomorrow.  He understands DO NOT RESTART ELIQUIS UNTIL 72 HOURS POST OP.    Principal Problem:    S/P lumbar fusion    Assessment: S/p L2-3 fusion    Plan: PT/OT and ambulation  Brace when out of bed   Encourage use of IS and deep breathing   Suture/Staple removal in 10-14 days      I have discussed the following assessment and plan with Dr. Gallego who is in agreement with initial plan and will follow up with further consultation recommendations.    [facility] Shen [facility]  Tel [phone]    Interval History   Stable; POD 1    Physical Exam   Temp: 97  F (36.1  C) Temp src: Temporal BP: 138/49 Pulse: 61   Resp: 16 SpO2: 93 % O2 Device: None (Room air) Oxygen Delivery: 2 LPM  Vitals:    10/27/22 0901   Weight: 101.2 kg (223 lb)     Vital Signs with Ranges  Temp:  [96.8  F (36  C)-98.7  F (37.1  C)] 97  F (36.1  C)  Pulse:  [59-73] 61  Resp:  [9-23] 16  BP: (118-166)/() 138/49  SpO2:  [92 %-100 %] 93 %  I/O last 3 completed shifts:  In: 2688 [P.O.:200; I.V.:2488]  Out: 5425 [Urine:5050; Drains:325; Blood:50]     , Blood pressure 138/49, pulse 61, temperature 97  F (36.1  C), temperature source Temporal, resp. rate 16, height 1.626 m (5' 4\"), weight 101.2 kg (223 lb), SpO2 " 93 %.  223 lbs 0 oz  HEENT:  Normocephalic, atraumatic.   Lungs:  No SOB  Skin:  Warm and dry, good capillary refill. BECKY x2 intact  Extremities:  Good radial and dorsalis pedis pulses bilaterally, no edema, cyanosis or clubbing.    NEUROLOGICAL EXAMINATION:   Mental status:  Awake and alert; speech is clear.   Motor:  Stable strength to BLE  Gait:  Standing at edge of bed with assist.    Medications     sodium chloride Stopped (10/28/22 0849)       acetaminophen  975 mg Oral Q8H     allopurinol  300 mg Oral Daily     amiodarone  200 mg Oral Daily     amLODIPine  10 mg Oral Daily     atorvastatin  80 mg Oral At Bedtime     ceFAZolin  2 g Intravenous Q8H     famotidine  20 mg Oral BID    Or     famotidine  20 mg Intravenous BID     furosemide  60 mg Oral Daily     levothyroxine  175 mcg Oral Daily     polyethylene glycol  17 g Oral Daily     Pregabalin  200 mg Oral BID     senna-docusate  1 tablet Oral BID    Or     senna-docusate  2 tablet Oral BID     sodium chloride (PF)  3 mL Intracatheter Q8H       Data   CBC RESULTS:   Recent Labs   Lab Test 09/09/14  0000   WBC 6.7   RBC 4.67   HGB 14.0   HCT 41.8   MCV 90   MCH 30   MCHC 33.5   RDW 13.1        Basic Metabolic Panel:  Lab Results   Component Value Date     04/29/2013      Lab Results   Component Value Date    POTASSIUM 3.5 05/02/2018     Lab Results   Component Value Date    CHLORIDE 106 04/29/2013     Lab Results   Component Value Date    CATE 9.3 04/29/2013     Lab Results   Component Value Date    CO2 25 04/29/2013     Lab Results   Component Value Date    BUN 22 04/29/2013     Lab Results   Component Value Date    CR 0.68 10/27/2022    CR 0.68 05/02/2018     Lab Results   Component Value Date     10/28/2022     09/09/2014     09/09/2014     INR:  Lab Results   Component Value Date    INR 1.44 05/02/2013    INR 1.35 05/01/2013    INR 1.32 04/30/2013    INR 1.04 04/29/2013

## 2022-10-28 NOTE — CONSULTS
Hospitalist Consultation      Emile Wood MRN# [id number]   YOB: 1950 Age: 71 year old   Date of Admission: 10/27/2022     Requesting Physician:  Dr. Gallego  Reason for consult: Post-op medical management            Assessment and Plan:   This patient is a 71 year old male who is POD #1 s/p L2-3 decompression revision and fusion.  Overall doing well, no complaints. Pain controlled.    1. L2-3 decompression revision and fusion - pain, prophylaxis, diet and PT/OT per spine surgery. Pt is on Eliquis, resume this evening if ok with surgery team.    2. CAD   S/p 2v CABG in 2002  S/p PCI with stents in 2018  Aortic stenosis s/p TAVR 2021  PAF, SSS s/p PM 2021  HTN  Denies chest pain or SOB.  Resume home amlodipine, amiodarone, and lasix  Resume home Eliquis tonight if ok with surgery   3. HLP  Resume home statin   4. Hypothyroidism   Resume home med   5. Polyneuropathy, likely due to alcohol abuse  Resume home Lyrica                History of Present Illness:   This patient is a 71 year old male who is POD #1 s/p L2-3 decompression revision and fusion. He has severe DDD at L2-3 and severe foraminal stenosis at L2-3 with hx of previous L2-5 decompressive laminectomies and has failed conservative outpatient management so presents here for elective L2-3 decompression revision and fusion. He tolerated the surgery well, pain is well controlled, has had adequate I&Os, and he is tolerating PO intake. Bernal removed this AM and he has voided spontaneously. He is passing gas but no BM yet. He does note an area of lateral rib pain last evening that is now improved. He denies fever, chills, chest pain, SOB, cough, abdominal pain, nausea, vomiting, or diarrhea. He also has a PMHx of CAD, PAF, AORTIC STENOSIS s/p TAVR, carotid artery disease, SSS s/p PM, HTN, HLP, hyperthyroidism and polyneuropathy.              Past Medical History:     Past Medical History:   Diagnosis Date     Arthritis     knee and back      CAD (coronary artery disease)      Chronic sinusitis      Gout, chronic      Heart murmur      Hypertension      Irregular heart beat     hx at fib with 3 cardioversions     Numbness and tingling     peripheral neuropathy     Pacemaker      Peripheral neuropathy      Stented coronary artery     stent and CABG     Thyroid disease                Past Surgical History:     Past Surgical History:   Procedure Laterality Date     AORTIC VALVE REPLACEMENT           ARTHROPLASTY KNEE  2013    Procedure: ARTHROPLASTY KNEE;  LEFT TOTAL KNEE ARTHROPLASTY ;  Surgeon: Luciano Mcgraw MD;  Location: [facility] OR     cabg       CARDIAC SURGERY      CABG     COLONOSCOPY N/A 2018    Procedure: COLONOSCOPY;;  Surgeon: John Vilchis MD;  Location:  OR     ESOPHAGOSCOPY, GASTROSCOPY, DUODENOSCOPY (EGD), COMBINED N/A 2018    Procedure: COMBINED ESOPHAGOSCOPY, GASTROSCOPY, DUODENOSCOPY (EGD);  ESOPHAGOSCOPY, GASTROSCOPY, DUODENOSCOPY WITH BIOPSIES AND POLYPECTOMY, COLONOSCOPY WITH POLYPECTOMIES;  Surgeon: John Vilchis MD;  Location:  OR     OPTICAL TRACKING SYSTEM FUSION POSTERIOR SPINE LUMBAR N/A 10/27/2022    Procedure: LUMBAR 2 - LUMBAR 3 TRANSFORAMINAL INTERBODY FUSION;  Surgeon: Aung Gallego MD;  Location:  OR     ORTHOPEDIC SURGERY      hallie knee, L4, c5, shoulder                 Social History:     Social History     Tobacco Use     Smoking status: Former     Types: Cigarettes     Quit date: 2018     Years since quittin.8     Smokeless tobacco: Former     Tobacco comments:     smoke cigars   Vaping Use     Vaping Use: Never used   Substance Use Topics     Alcohol use: Yes     Comment: daily- 4 beers, glass of wine, shot of whisky     Drug use: No                 Family History:   I have reviewed this patient's family history            Allergies:   No Known Allergies          Medications:     Prior to Admission medications    Medication Sig Last Dose Taking? Auth Provider Long Term End  Date   acetaminophen (TYLENOL) 325 MG tablet Take 650 mg by mouth as needed 10/27/2022 at 0630 Yes Reported, Patient     allopurinol (ZYLOPRIM) 300 MG tablet Take 300 mg by mouth daily 10/26/2022 at 0700 Yes Reported, Patient     amiodarone (PACERONE) 200 MG tablet Take 200 mg by mouth daily 10/27/2022 at 0630 Yes Reported, Patient Yes    amLODIPine (NORVASC) 10 MG tablet Take 10 mg by mouth daily 10/27/2022 at 0630 Yes Reported, Patient  2/21/23   atorvastatin (LIPITOR) 80 MG tablet Take 80 mg by mouth At Bedtime 10/26/2022 at 2200 Yes Reported, Patient Yes 3/16/23   B Complex Vitamins (VITAMIN B COMPLEX PO) Take 1 tablet by mouth daily 10/20/2022 Yes Reported, Patient Yes    Calcium Carb-Cholecalciferol 600-800 MG-UNIT TABS Take 1 tablet by mouth daily 10/20/2022 Yes Reported, Patient     furosemide (LASIX) 20 MG tablet Take 60 mg by mouth daily 10/26/2022 at 0700 Yes Reported, Patient Yes    HYDROmorphone (DILAUDID) 2 MG tablet Take 1-2 tablets (2-4 mg) by mouth every 6 hours as needed for pain (Max of 6 tablets per day)  Yes Fifi Shen APRN CNP  11/3/22   levothyroxine (SYNTHROID/LEVOTHROID) 175 MCG tablet Take 175 mcg by mouth daily 10/27/2022 at 0630 Yes Reported, Patient Yes    methocarbamol (ROBAXIN) 500 MG tablet Take 1 tablet (500 mg) by mouth 3 times daily as needed for muscle spasms  Yes Fifi Shen APRN CNP     Pregabalin (LYRICA) 200 MG capsule Take 2 capsules in the morning and  one capsule in the evening 10/27/2022 at 0630 Yes Reported, Patient No    sildenafil (VIAGRA) 100 MG tablet Take 100 mg by mouth daily as needed (sexual activity) More than a month Yes Reported, Patient Yes    vitamin B-12 (CYANOCOBALAMIN) 1000 MCG tablet Take 1,000 mcg by mouth daily 10/20/2022 Yes Reported, Patient     nitroglycerin (NITROSTAT) 0.4 MG SL tablet Place 1 tablet under the tongue every 5 minutes as needed for chest pain (CALL 911 IF NOT IMPROVED AFTER THREE CONSECUTIVE DOSES).   Sanjay  "Raji Tapia MD                 Review of Systems:   A comprehensive greater than 10 system review of systems was carried out.  Pertinent positives and negatives are noted above.  Otherwise negative for contributory info.            Physical Exam:   Vitals were reviewed  Blood pressure 138/49, pulse 61, temperature 97  F (36.1  C), temperature source Temporal, resp. rate 16, height 1.626 m (5' 4\"), weight 101.2 kg (223 lb), SpO2 95 %.  Exam:    GENERAL:  Comfortable.  PSYCH: pleasant, oriented, No acute distress.  HEENT:  Normal conjunctiva, normal hearing, nasal mucosa and Oropharynx are normal.  NECK:  Supple, no neck vein distention  HEART:  Normal S1, S2 with no murmur, no pericardial rub, S3 or S4.  LUNGS:  Clear to auscultation, normal Respiratory effort.  GI:  Soft, normal bowel sounds.  EXTREMITIES:  Trace bilateral LE edema, +2 pulses bilateral and equal.  Low back dressing c/d/i  SKIN:  Dry to touch, No rash, wound or ulcerations.  NEUROLOGIC:  Nonfocal with normal cranial nerve and motor power and sensation.            Data:   Past 24 hours labs, studies, and imaging were reviewed.  No results for input(s): WBC, HGB, HCT, MCV, PLT in the last 168 hours.  Recent Labs   Lab 10/28/22  0647 10/27/22  0930   *  --    CR  --  0.68   GFRESTIMATED  --  >90       Alyssa Hall PA-C    Pt discussed with Dr. Aj who agrees with the care as discussed above.     "

## 2022-10-29 VITALS
SYSTOLIC BLOOD PRESSURE: 159 MMHG | HEART RATE: 72 BPM | OXYGEN SATURATION: 94 % | TEMPERATURE: 97.2 F | DIASTOLIC BLOOD PRESSURE: 76 MMHG | BODY MASS INDEX: 38.07 KG/M2 | HEIGHT: 64 IN | RESPIRATION RATE: 16 BRPM | WEIGHT: 223 LBS

## 2022-10-29 LAB
GLUCOSE BLDC GLUCOMTR-MCNC: 102 MG/DL (ref 70–99)
GLUCOSE BLDC GLUCOMTR-MCNC: 116 MG/DL (ref 70–99)

## 2022-10-29 PROCEDURE — 250N000013 HC RX MED GY IP 250 OP 250 PS 637: Performed by: PHYSICIAN ASSISTANT

## 2022-10-29 PROCEDURE — 250N000013 HC RX MED GY IP 250 OP 250 PS 637: Performed by: NURSE PRACTITIONER

## 2022-10-29 PROCEDURE — 99207 PR NO BILLABLE SERVICE THIS VISIT: CPT | Performed by: INTERNAL MEDICINE

## 2022-10-29 PROCEDURE — 250N000011 HC RX IP 250 OP 636: Performed by: NURSE PRACTITIONER

## 2022-10-29 PROCEDURE — 250N000013 HC RX MED GY IP 250 OP 250 PS 637: Performed by: INTERNAL MEDICINE

## 2022-10-29 RX ORDER — HYDROXYZINE HYDROCHLORIDE 25 MG/1
25 TABLET, FILM COATED ORAL EVERY 6 HOURS PRN
Status: DISCONTINUED | OUTPATIENT
Start: 2022-10-29 | End: 2022-10-29 | Stop reason: HOSPADM

## 2022-10-29 RX ORDER — PREGABALIN 100 MG/1
200 CAPSULE ORAL ONCE
Status: COMPLETED | OUTPATIENT
Start: 2022-10-29 | End: 2022-10-29

## 2022-10-29 RX ADMIN — HYDROXYZINE HYDROCHLORIDE 10 MG: 10 TABLET ORAL at 05:30

## 2022-10-29 RX ADMIN — SENNOSIDES AND DOCUSATE SODIUM 1 TABLET: 8.6; 5 TABLET ORAL at 07:51

## 2022-10-29 RX ADMIN — PREGABALIN 200 MG: 100 CAPSULE ORAL at 07:50

## 2022-10-29 RX ADMIN — METHOCARBAMOL 750 MG: 750 TABLET ORAL at 13:21

## 2022-10-29 RX ADMIN — CEFAZOLIN SODIUM 2 G: 2 INJECTION, SOLUTION INTRAVENOUS at 12:45

## 2022-10-29 RX ADMIN — AMIODARONE HYDROCHLORIDE 200 MG: 200 TABLET ORAL at 07:51

## 2022-10-29 RX ADMIN — AMLODIPINE BESYLATE 10 MG: 10 TABLET ORAL at 07:51

## 2022-10-29 RX ADMIN — CEFAZOLIN SODIUM 2 G: 2 INJECTION, SOLUTION INTRAVENOUS at 05:30

## 2022-10-29 RX ADMIN — ACETAMINOPHEN 975 MG: 325 TABLET, FILM COATED ORAL at 07:49

## 2022-10-29 RX ADMIN — FUROSEMIDE 60 MG: 40 TABLET ORAL at 07:50

## 2022-10-29 RX ADMIN — LEVOTHYROXINE SODIUM 175 MCG: 125 TABLET ORAL at 07:50

## 2022-10-29 RX ADMIN — HYDROXYZINE HYDROCHLORIDE 25 MG: 25 TABLET, FILM COATED ORAL at 13:21

## 2022-10-29 RX ADMIN — HYDROMORPHONE HYDROCHLORIDE 4 MG: 2 TABLET ORAL at 06:23

## 2022-10-29 RX ADMIN — FAMOTIDINE 20 MG: 20 TABLET ORAL at 07:50

## 2022-10-29 RX ADMIN — ACETAMINOPHEN 975 MG: 325 TABLET, FILM COATED ORAL at 00:00

## 2022-10-29 RX ADMIN — HYDROMORPHONE HYDROCHLORIDE 4 MG: 2 TABLET ORAL at 02:04

## 2022-10-29 RX ADMIN — ALLOPURINOL 300 MG: 300 TABLET ORAL at 07:51

## 2022-10-29 RX ADMIN — POLYETHYLENE GLYCOL 3350 17 G: 17 POWDER, FOR SOLUTION ORAL at 07:51

## 2022-10-29 RX ADMIN — PREGABALIN 200 MG: 100 CAPSULE ORAL at 10:14

## 2022-10-29 RX ADMIN — METHOCARBAMOL 750 MG: 750 TABLET ORAL at 02:04

## 2022-10-29 ASSESSMENT — ACTIVITIES OF DAILY LIVING (ADL)
ADLS_ACUITY_SCORE: 22

## 2022-10-29 NOTE — PROGRESS NOTES
"[facility]    Medicine Progress Note - Hospitalist Service    Date of Admission:  10/27/2022    Assessment & Plan     Mr. Wood is a 71-year-old male with a past medical history significant for hypertension, hyperlipidemia, aortic stenosis status post TAVR, atrial fibrillation, coronary artery disease, congestive heart failure, hypothyroidism, polyneuropathy, and degenerative disc disease.  He is status post L2-L3 decompression and fusion procedure.    L2-L3 decompression and fusion.  -Pain medications as needed.  -Use incentive spirometry.  -Work with therapy.    Atrial fibrillation.  -Hold apixaban for 72 hours status post above-noted surgery.  -Continue amiodarone 200 mg a day.    Coronary artery disease.  Hypertension.  Hyperlipidemia.  Chronic heart failure with preserved ejection fraction.  -Continue atorvastatin 80 mg a day.  -Continue amlodipine 10 mg a day.  -Continue furosemide 60 mg a day.    Hypothyroidism.  -Continue levothyroxine.    Polyneuropathy.  -Continue Lyrica.       Diet: Advance Diet as Tolerated: Regular Diet Adult  Diet    DVT Prophylaxis: Pneumatic Compression Devices  Bernal Catheter: Not present  Central Lines: None  Cardiac Monitoring: None  Code Status: Full Code      Disposition Plan      Expected Discharge Date: 10/29/2022, 12:00 PM                  Vamsi Aj DO  Hospitalist Service  [facility]  Securely message with the Vocera Web Console (learn more here)  Text page via [email or web address] Paging/Directory         Clinically Significant Risk Factors                       # Obesity: Estimated body mass index is 38.28 kg/m  as calculated from the following:    Height as of this encounter: 1.626 m (5' 4\").    Weight as of this encounter: 101.2 kg (223 lb)., PRESENT ON ADMISSION         ______________________________________________________________________    Interval History   Having back pain.  Denies chest pain, shortness of breath, " fevers, chills, nausea, or vomiting.    Data reviewed today: I reviewed all medications, new labs and imaging results over the last 24 hours.     Physical Exam   Vital Signs: Temp: 97.2  F (36.2  C) Temp src: Temporal BP: (!) 159/76 Pulse: 72   Resp: 16 SpO2: 94 % O2 Device: None (Room air)    Weight: 223 lbs 0 oz  Gen:  NAD, A&Ox3.  Eyes:  PERRL, sclera anicteric.  OP:  MMM, no lesions.  Neck:  Supple.  CV:  Regular, no murmurs.  Lung:  CTA b/l, normal effort.  Ab:  +BS, soft.  Skin:  Warm, dry to touch.  No rash.  Ext:  No pitting edema LE b/l.      Data   Recent Labs   Lab 10/29/22  0552 10/28/22  0647 10/27/22  0930   CR  --   --  0.68   * 130*  --

## 2022-10-29 NOTE — PROGRESS NOTES
[facility]    Date of Service (when I saw the patient): 10/29/2022     Assessment & Plan   Emile Wood is a 71 year old male who was admitted on 10/27/2022 with low back pain and RLE pain.  He had severe L2-3 degenerative disc with foraminal stenosis.  He underwent L2-3 TLIF with Dr. Aung Gallego on 10/27/22.  He states his pain is well controlled and he has been ambulating with a 4WW.  He denies N/V and is tolerating diet.  He denies N/V, SOB, chest pain or dizziness.  He states he is ready to go home today with his wife.  We reviewed discharge instructions and he may restart Eliquis Sunday (10/30/22) evenging per Dr. Gallego.    Principal Problem:    S/P lumbar fusion    Assessment: S/p L2-3 fusion    Plan: Ok to discharge to home today with wife.  Discharge instructions reviewed.       I have discussed the following assessment and plan with Dr. Gallego who is in agreement with initial plan and will follow up with further consultation recommendations.     Fifi Shen [facility]  Tel [phone]         Interval History   POD 2; stable    Physical Exam   Temp: 97.2  F (36.2  C) Temp src: Temporal BP: (!) 159/76 Pulse: 72   Resp: 16 SpO2: 94 % O2 Device: None (Room air)    Vitals:    10/27/22 0901   Weight: 101.2 kg (223 lb)     Vital Signs with Ranges  Temp:  [96.6  F (35.9  C)-98  F (36.7  C)] 97.2  F (36.2  C)  Pulse:  [69-73] 72  Resp:  [14-16] 16  BP: (112-159)/(46-76) 159/76  SpO2:  [94 %] 94 %  I/O last 3 completed shifts:  In: 844 [P.O.:840; I.V.:4]  Out: 430 [Urine:250; Drains:180]     , Blood pressure (!) 159/76, pulse 72, temperature 97.2  F (36.2  C), temperature source Temporal, resp. rate 16, height 1.626 m (5' 4\"), weight 101.2 kg (223 lb), SpO2 94 %.  223 lbs 0 oz  HEENT:  Normocephalic, atraumatic.   Heart:  No peripheral edema  Lungs:  No SOB  Skin:  Warm and dry, good capillary refill. BECKY x2 intact; incision " intact with stables with mild redness.  xtremities:  Good radial and dorsalis pedis pulses bilaterally, no edema, cyanosis or clubbing.    NEUROLOGICAL EXAMINATION:   Mental status:  Awake and alert  Motor:    Hip Flexor:                Right: 5/5  Left:  5/5  Hip Adductor:             Right:  5/5  Left:  5/5  Hip Abductor:             Right:  5/5  Left:  5/5  Gastroc Soleus:        Right:  5/5  Left:  5/5  Tib/Ant:                      Right:  5/5  Left:  5/5  EHL:                     Right:  5/5  Left:  5/5  Gait:  Walking with 4WW; steady gait.      Medications     sodium chloride Stopped (10/28/22 0849)       acetaminophen  975 mg Oral Q8H     allopurinol  300 mg Oral Daily     amiodarone  200 mg Oral Daily     amLODIPine  10 mg Oral Daily     atorvastatin  80 mg Oral At Bedtime     ceFAZolin  2 g Intravenous Q8H     famotidine  20 mg Oral BID    Or     famotidine  20 mg Intravenous BID     furosemide  60 mg Oral Daily     levothyroxine  175 mcg Oral Daily     polyethylene glycol  17 g Oral Daily     Pregabalin  200 mg Oral BID     senna-docusate  1 tablet Oral BID    Or     senna-docusate  2 tablet Oral BID     sodium chloride (PF)  3 mL Intracatheter Q8H       Data     CBC RESULTS:   Recent Labs   Lab Test 09/09/14  0000   WBC 6.7   RBC 4.67   HGB 14.0   HCT 41.8   MCV 90   MCH 30   MCHC 33.5   RDW 13.1        Basic Metabolic Panel:  Lab Results   Component Value Date     04/29/2013      Lab Results   Component Value Date    POTASSIUM 3.5 05/02/2018     Lab Results   Component Value Date    CHLORIDE 106 04/29/2013     Lab Results   Component Value Date    CATE 9.3 04/29/2013     Lab Results   Component Value Date    CO2 25 04/29/2013     Lab Results   Component Value Date    BUN 22 04/29/2013     Lab Results   Component Value Date    CR 0.68 10/27/2022    CR 0.68 05/02/2018     Lab Results   Component Value Date     10/29/2022     09/09/2014     09/09/2014     INR:  Lab Results    Component Value Date    INR 1.44 05/02/2013    INR 1.35 05/01/2013    INR 1.32 04/30/2013    INR 1.04 04/29/2013

## 2022-10-29 NOTE — DISCHARGE SUMMARY
[facility]    Discharge Summary  [facility]    Date of Admission:  10/27/2022  Date of Discharge:  10/29/2022  Discharging Provider: KARLA Angel CNP  Date of Service (when I saw the patient): 10/29/22    Discharge Diagnoses   Principal Problem:    S/P lumbar fusion      History of Present Illness   Emile Wood is a 71 year old male who was admitted on 10/27/2022 with low back pain and RLE pain.  He had severe L2-3 degenerative disc with foraminal stenosis.  He underwent L2-3 TLIF with Dr. Aung Gallego on 10/27/22. His leg pain is improved and he states he has mild incision/low back pain.  He is ready to discharge to home today with his wife. We reviewed discharge instructions and he asked appropriate questions.  He may restart Eliquis 10/20/22 pm and he is agreeable.  He will continue Dilaudid prn post op pain and transition to Oxycodone when Dilaudid is finished (he has Oxycodone at home).  He understands not to take both at the same time.  If he has any questions he will contact the office.    Hospital Course   Emile Wood was admitted on 10/27/2022.  The following problems were addressed during his hospitalization:    Principal Problem:    S/P lumbar fusion    Assessment: S/p L2-3 fusion    Plan: Ok to discharge to home today with wife.  Discharge instructions reviewed.       I have discussed the following assessment and plan with Dr. Gallego who is in agreement with initial plan and will follow up with further consultation recommendations.     Fifi Shen [facility]  Tel [phone]      Pending Results     Unresulted Labs Ordered in the Past 30 Days of this Admission     No orders found from 9/27/2022 to 10/28/2022.          Code Status   Full Code    Primary Care Physician   Luciano Hogan    Physical Exam   Temp: 97.2  F (36.2  C) Temp src: Temporal BP: (!) 159/76 Pulse: 72   Resp: 16 SpO2: 94 % O2 Device: None (Room air)     Vitals:    10/27/22 0901   Weight: 101.2 kg (223 lb)     Vital Signs with Ranges  Temp:  [96.6  F (35.9  C)-98  F (36.7  C)] 97.2  F (36.2  C)  Pulse:  [69-73] 72  Resp:  [14-16] 16  BP: (112-159)/(46-76) 159/76  SpO2:  [94 %] 94 %  I/O last 3 completed shifts:  In: 844 [P.O.:840; I.V.:4]  Out: 430 [Urine:250; Drains:180]      Time Spent on this Encounter   IFifi, KARLA CNP, personally saw the patient today and spent less than or equal to 30 minutes discharging this patient.    Discharge Disposition   Discharged to home  Condition at discharge: Stable    Consultations This Hospital Stay   OCCUPATIONAL THERAPY ADULT IP CONSULT  PHYSICAL THERAPY ADULT IP CONSULT  HOSPITALIST IP CONSULT    Discharge Orders      Reason for your hospital stay    You were in the hospital for L2-3 fusion surgery of your spine.     Activity    Your activity upon discharge: Discharge instructions:  No lifting of more than 10 pounds until follow up visit.  Ok to shampoo hair, shower or bathe, but, do not scrub or submerge incision under water until evaluated post operatively in clinic.    Ok to walk as tolerated, avoid bedrest.    No contact sports until after follow up visit  No high impact activities such as; running/jogging, snowmobile or 4 branham riding or any other recreational vehicles    Call my office at [phone] for increasing redness, swelling or pus draining from the incision, increased pain or any other questions and concerns.    Go to ER with any seizure activity, mental status change (increasing confusion), difficulty with speech or increasing or acute weakness     Wound care and dressings    Instructions to care for your wound at home: Keep your incision clean and dry at all times.  OK to remove dressing on postop day 2.  OK to shower on postop day 3 and allow water to run over incision, pat dry after shower.  No bathing swimming or submerging in water.  Call immediately or come to ED if any drainage  occurs, or you develop new pain, redness, or swelling.     Discharge Instructions    You may restart Eliquis 72 hours after surgery; 10/30/22 in the evening.     Follow-up and recommended labs and tests     Please follow up at [facility] 12-14 days after surgery for staple removal.  Call [phone] to schedule your appointment with Dr. Gallego' team.     Diet    Follow this diet upon discharge: Resume pre surgery diet     Discharge Medications   Current Discharge Medication List      START taking these medications    Details   HYDROmorphone (DILAUDID) 2 MG tablet Take 1-2 tablets (2-4 mg) by mouth every 6 hours as needed for pain (Max of 6 tablets per day)  Qty: 30 tablet, Refills: 0    Associated Diagnoses: S/P lumbar fusion      methocarbamol (ROBAXIN) 500 MG tablet Take 1 tablet (500 mg) by mouth 3 times daily as needed for muscle spasms  Qty: 40 tablet, Refills: 0    Associated Diagnoses: S/P lumbar fusion         CONTINUE these medications which have NOT CHANGED    Details   acetaminophen (TYLENOL) 325 MG tablet Take 650 mg by mouth as needed      allopurinol (ZYLOPRIM) 300 MG tablet Take 300 mg by mouth daily      amiodarone (PACERONE) 200 MG tablet Take 200 mg by mouth daily      amLODIPine (NORVASC) 10 MG tablet Take 10 mg by mouth daily      atorvastatin (LIPITOR) 80 MG tablet Take 80 mg by mouth At Bedtime      B Complex Vitamins (VITAMIN B COMPLEX PO) Take 1 tablet by mouth daily      Calcium Carb-Cholecalciferol 600-800 MG-UNIT TABS Take 1 tablet by mouth daily      furosemide (LASIX) 20 MG tablet Take 60 mg by mouth daily      levothyroxine (SYNTHROID/LEVOTHROID) 175 MCG tablet Take 175 mcg by mouth daily      Pregabalin (LYRICA) 200 MG capsule Take 2 capsules in the morning and  one capsule in the evening      sildenafil (VIAGRA) 100 MG tablet Take 100 mg by mouth daily as needed (sexual activity)      vitamin B-12 (CYANOCOBALAMIN) 1000 MCG tablet Take 1,000 mcg by mouth daily      nitroglycerin  (NITROSTAT) 0.4 MG SL tablet Place 1 tablet under the tongue every 5 minutes as needed for chest pain (CALL 911 IF NOT IMPROVED AFTER THREE CONSECUTIVE DOSES).  Qty: 25 tablet, Refills: 0         STOP taking these medications       apixaban ANTICOAGULANT (ELIQUIS) 5 MG tablet Comments:   Reason for Stopping:         oxyCODONE-acetaminophen (PERCOCET)  MG per tablet Comments:   Reason for Stopping:             Allergies   No Known Allergies

## 2022-10-29 NOTE — PLAN OF CARE
Goal Outcome Evaluation:      Plan of Care Reviewed With: patient    Overall Patient Progress: improvingOverall Patient Progress: improving     Patient vital signs are at baseline: Yes  Patient able to ambulate as they were prior to admission or with assist devices provided by therapies during their stay:  Yes  Patient MUST void prior to discharge:  Yes  Patient able to tolerate oral intake:  Yes  Pain has adequate pain control using Oral analgesics:  Yes  Does patient have an identified :  Yes  Has goal D/C date and time been discussed with patient:  Yes     Pt A&O x4. VVS. PIV SL. Voiding good amounts. Tolerating regular diet well. SBA with walker, gait belt, and brace. Pt not always compliant with wearing brace. Pain being managed with scheduled tylenol and PRN PO dilaudid, IV Dilaudid, Robaxin, and Atarax. x2 BECKY drains. Plan is discharge in the am.     To CT

## 2022-10-29 NOTE — PLAN OF CARE
Goal Outcome Evaluation:      Plan of Care Reviewed With: patient, spouse               Reviewed discharge instructions and medications with patient and wife, Blessing. Questions answered. Patient discharged to home with wife, discharge instructions, medications (Dilaudid), and belongings.

## 2022-11-27 ENCOUNTER — HEALTH MAINTENANCE LETTER (OUTPATIENT)
Age: 72
End: 2022-11-27

## 2023-09-19 ENCOUNTER — APPOINTMENT (OUTPATIENT)
Dept: URBAN - METROPOLITAN AREA CLINIC 256 | Age: 73
Setting detail: DERMATOLOGY
End: 2023-09-20

## 2023-09-19 VITALS — WEIGHT: 225 LBS | HEIGHT: 64 IN

## 2023-09-19 DIAGNOSIS — D22 MELANOCYTIC NEVI: ICD-10-CM

## 2023-09-19 DIAGNOSIS — D69.2 OTHER NONTHROMBOCYTOPENIC PURPURA: ICD-10-CM

## 2023-09-19 DIAGNOSIS — L57.8 OTHER SKIN CHANGES DUE TO CHRONIC EXPOSURE TO NONIONIZING RADIATION: ICD-10-CM

## 2023-09-19 DIAGNOSIS — L82.0 INFLAMED SEBORRHEIC KERATOSIS: ICD-10-CM

## 2023-09-19 DIAGNOSIS — Z71.89 OTHER SPECIFIED COUNSELING: ICD-10-CM

## 2023-09-19 DIAGNOSIS — D18.0 HEMANGIOMA: ICD-10-CM

## 2023-09-19 DIAGNOSIS — L82.1 OTHER SEBORRHEIC KERATOSIS: ICD-10-CM

## 2023-09-19 PROBLEM — D22.5 MELANOCYTIC NEVI OF TRUNK: Status: ACTIVE | Noted: 2023-09-19

## 2023-09-19 PROBLEM — D18.01 HEMANGIOMA OF SKIN AND SUBCUTANEOUS TISSUE: Status: ACTIVE | Noted: 2023-09-19

## 2023-09-19 PROBLEM — D22.62 MELANOCYTIC NEVI OF LEFT UPPER LIMB, INCLUDING SHOULDER: Status: ACTIVE | Noted: 2023-09-19

## 2023-09-19 PROBLEM — D22.39 MELANOCYTIC NEVI OF OTHER PARTS OF FACE: Status: ACTIVE | Noted: 2023-09-19

## 2023-09-19 PROBLEM — D22.61 MELANOCYTIC NEVI OF RIGHT UPPER LIMB, INCLUDING SHOULDER: Status: ACTIVE | Noted: 2023-09-19

## 2023-09-19 PROCEDURE — OTHER COUNSELING: OTHER

## 2023-09-19 PROCEDURE — 17110 DESTRUCT B9 LESION 1-14: CPT

## 2023-09-19 PROCEDURE — OTHER MIPS QUALITY: OTHER

## 2023-09-19 PROCEDURE — 99213 OFFICE O/P EST LOW 20 MIN: CPT | Mod: 25

## 2023-09-19 PROCEDURE — OTHER LIQUID NITROGEN: OTHER

## 2023-09-19 ASSESSMENT — LOCATION SIMPLE DESCRIPTION DERM
LOCATION SIMPLE: LEFT CHEEK
LOCATION SIMPLE: SCALP
LOCATION SIMPLE: LEFT UPPER ARM
LOCATION SIMPLE: LEFT UPPER BACK
LOCATION SIMPLE: RIGHT FOREARM
LOCATION SIMPLE: LEFT FOREARM
LOCATION SIMPLE: UPPER BACK
LOCATION SIMPLE: RIGHT FOREHEAD

## 2023-09-19 ASSESSMENT — LOCATION DETAILED DESCRIPTION DERM
LOCATION DETAILED: RIGHT INFERIOR MEDIAL FOREHEAD
LOCATION DETAILED: LEFT ANTECUBITAL SKIN
LOCATION DETAILED: LEFT SUPERIOR MEDIAL UPPER BACK
LOCATION DETAILED: LEFT INFERIOR CENTRAL MALAR CHEEK
LOCATION DETAILED: RIGHT DISTAL DORSAL FOREARM
LOCATION DETAILED: LEFT DISTAL DORSAL FOREARM
LOCATION DETAILED: LEFT VENTRAL PROXIMAL FOREARM
LOCATION DETAILED: LEFT CENTRAL MALAR CHEEK
LOCATION DETAILED: RIGHT SUPERIOR FRONTAL SCALP
LOCATION DETAILED: RIGHT VENTRAL PROXIMAL FOREARM
LOCATION DETAILED: SUPERIOR THORACIC SPINE

## 2023-09-19 ASSESSMENT — LOCATION ZONE DERM
LOCATION ZONE: ARM
LOCATION ZONE: SCALP
LOCATION ZONE: FACE
LOCATION ZONE: TRUNK

## 2023-09-19 ASSESSMENT — SEVERITY ASSESSMENT: SEVERITY: MILD

## 2023-09-19 NOTE — PROCEDURE: LIQUID NITROGEN
Render Post-Care Instructions In Note?: no
Show Applicator Variable?: Yes
Spray Paint Text: The liquid nitrogen was applied to the skin utilizing a spray paint frosting technique.
Post-Care Instructions: I reviewed with the patient in detail post-care instructions. Patient is to wear sunprotection, and avoid picking at any of the treated lesions. Pt may apply Vaseline to crusted or scabbing areas.
Consent: The patient's consent was obtained including but not limited to risks of crusting, scabbing, blistering, scarring, darker or lighter pigmentary change, recurrence, incomplete removal and infection.
Detail Level: Detailed
Medical Necessity Information: It is in your best interest to select a reason for this procedure from the list below. All of these items fulfill various CMS LCD requirements except the new and changing color options.
Medical Necessity Clause: This procedure was medically necessary because the lesions that were treated were:

## 2023-09-25 ENCOUNTER — TRANSCRIBE ORDERS (OUTPATIENT)
Dept: OTHER | Age: 73
End: 2023-09-25

## 2023-09-25 ENCOUNTER — TRANSFERRED RECORDS (OUTPATIENT)
Dept: HEALTH INFORMATION MANAGEMENT | Facility: CLINIC | Age: 73
End: 2023-09-25
Payer: MEDICARE

## 2023-09-25 DIAGNOSIS — Q76.49 SPINE DEFORMITY: ICD-10-CM

## 2023-09-25 DIAGNOSIS — Z98.1 S/P LUMBAR FUSION: ICD-10-CM

## 2023-09-25 DIAGNOSIS — M54.50 ACUTE LOW BACK PAIN: Primary | ICD-10-CM

## 2023-09-30 NOTE — TELEPHONE ENCOUNTER
Action September 29, 2023 10:41 PM MT   Action Taken Sent a request to [facility], [facility], and [facility] for imaging. *RESOLVED*       DIAGNOSIS: Lumbar Spine   APPOINTMENT DATE: 10/04/2023   NOTES STATUS DETAILS   OFFICE NOTE from referring provider Media Tab 09/25/2023 - Aung Gallego MD - [facility]   OFFICE NOTE from other specialist [facility] Records    [facility]     OPERATIVE REPORT Care Everywhere  Internal 10/27/2022 - Redo Lumbar Fusion    04/12/2016 - Lumbar Laminectomy Discectomy and Decompressive Laminectomy   MEDICATION LIST Care Everywhere    IMPLANT RECORD/STICKER Care Everywhere  Internal    LABS     IMAGING PACS

## 2023-10-02 DIAGNOSIS — Z98.1 S/P LUMBAR FUSION: Primary | ICD-10-CM

## 2023-10-04 ENCOUNTER — ANCILLARY PROCEDURE (OUTPATIENT)
Dept: GENERAL RADIOLOGY | Facility: CLINIC | Age: 73
End: 2023-10-04
Attending: ORTHOPAEDIC SURGERY
Payer: MEDICARE

## 2023-10-04 ENCOUNTER — OFFICE VISIT (OUTPATIENT)
Dept: ORTHOPEDICS | Facility: CLINIC | Age: 73
End: 2023-10-04
Payer: MEDICARE

## 2023-10-04 ENCOUNTER — PRE VISIT (OUTPATIENT)
Dept: ORTHOPEDICS | Facility: CLINIC | Age: 73
End: 2023-10-04

## 2023-10-04 VITALS — HEIGHT: 65 IN | WEIGHT: 223 LBS | BODY MASS INDEX: 37.15 KG/M2

## 2023-10-04 DIAGNOSIS — G62.1 ALCOHOLIC POLYNEUROPATHY (H): ICD-10-CM

## 2023-10-04 DIAGNOSIS — I48.91 ATRIAL FIBRILLATION, UNSPECIFIED TYPE (H): ICD-10-CM

## 2023-10-04 DIAGNOSIS — M51.369 LUMBAR ADJACENT SEGMENT DISEASE WITH SPONDYLOLISTHESIS: ICD-10-CM

## 2023-10-04 DIAGNOSIS — M40.37 FLATBACK SYNDROME OF LUMBOSACRAL REGION: ICD-10-CM

## 2023-10-04 DIAGNOSIS — M40.37 FLATBACK SYNDROME OF LUMBOSACRAL REGION: Primary | ICD-10-CM

## 2023-10-04 DIAGNOSIS — M43.16 LUMBAR ADJACENT SEGMENT DISEASE WITH SPONDYLOLISTHESIS: ICD-10-CM

## 2023-10-04 DIAGNOSIS — I50.32 CHRONIC DIASTOLIC CHF (CONGESTIVE HEART FAILURE) (H): ICD-10-CM

## 2023-10-04 DIAGNOSIS — L97.511 SKIN ULCER OF TOE OF RIGHT FOOT, LIMITED TO BREAKDOWN OF SKIN (H): ICD-10-CM

## 2023-10-04 DIAGNOSIS — Z98.1 S/P LUMBAR FUSION: ICD-10-CM

## 2023-10-04 PROCEDURE — 99205 OFFICE O/P NEW HI 60 MIN: CPT | Performed by: ORTHOPAEDIC SURGERY

## 2023-10-04 PROCEDURE — 77073 BONE LENGTH STUDIES: CPT | Performed by: STUDENT IN AN ORGANIZED HEALTH CARE EDUCATION/TRAINING PROGRAM

## 2023-10-04 PROCEDURE — 72100 X-RAY EXAM L-S SPINE 2/3 VWS: CPT | Mod: XS | Performed by: STUDENT IN AN ORGANIZED HEALTH CARE EDUCATION/TRAINING PROGRAM

## 2023-10-04 PROCEDURE — 72082 X-RAY EXAM ENTIRE SPI 2/3 VW: CPT | Performed by: STUDENT IN AN ORGANIZED HEALTH CARE EDUCATION/TRAINING PROGRAM

## 2023-10-04 NOTE — PROGRESS NOTES
Spine Surgery Consultation    REFERRING PHYSICIAN: Aung Gallego   PRIMARY CARE PHYSICIAN: Luciano Hogan           Chief Complaint:   Consult (Low back pain, referred Julián, Aung Ramos MD)      History of Present Illness:  Symptom Profile Including: location of symptoms, onset, severity, exacerbating/alleviating factors, previous treatments:        Emile Wood is a 72 year old male with remote history of lumbar L2-S1 decompression with Dr Vergara, more recent revision decompression and TLIF at L2-3 with Pablo Gallego who presents today for evaluation of chronic pain of lower back as well as right lower extremity radicular pain in an L5 dermatome.     Pt reports low back pain localized to the right lumbosacral junction for multiple years. Exacerbating factors include walking, prolonged sitting, twisting. Alleviating factors include  heat and deep tissue massage, and leaning forward. Dry needle has been tried at PT with relief that is short term only.    Patient denies saddle anesthesia, loss of bowel or bladder control.     Past treatments tried:  - Physical therapy: Pt has 6 months of PT without improvement to symptoms.  - Injections: Pt has had previous injections without lasting improvements to symptoms.  - Medications: Pt routinely takes oxycodone 2x daily and has done so for multiple years.         Past Medical History:     Past Medical History:   Diagnosis Date    Arthritis     knee and back    CAD (coronary artery disease)     Chronic sinusitis     Gout, chronic     Heart murmur     Hypertension     Irregular heart beat     hx at fib with 3 cardioversions    Numbness and tingling     peripheral neuropathy    Pacemaker     Peripheral neuropathy     Stented coronary artery     stent and CABG    Thyroid disease      Patient Active Problem List   Diagnosis    S/P total knee replacement    S/P lumbar fusion             Past Surgical History:     Past Surgical History:   Procedure  Laterality Date    AORTIC VALVE REPLACEMENT          ARTHROPLASTY KNEE  2013    Procedure: ARTHROPLASTY KNEE;  LEFT TOTAL KNEE ARTHROPLASTY ;  Surgeon: Luciano Mcgraw MD;  Location:  OR    [facility]      CARDIAC SURGERY      [facility]    COLONOSCOPY N/A 2018    Procedure: COLONOSCOPY;;  Surgeon: John Vilchis MD;  Location: [facility] OR    ESOPHAGOSCOPY, GASTROSCOPY, DUODENOSCOPY (EGD), COMBINED N/A 2018    Procedure: COMBINED ESOPHAGOSCOPY, GASTROSCOPY, DUODENOSCOPY (EGD);  ESOPHAGOSCOPY, GASTROSCOPY, DUODENOSCOPY WITH BIOPSIES AND POLYPECTOMY, COLONOSCOPY WITH POLYPECTOMIES;  Surgeon: John Vilchis MD;  Location: [facility] OR    OPTICAL TRACKING SYSTEM FUSION POSTERIOR SPINE LUMBAR N/A 10/27/2022    Procedure: Redo left L2-3 decompression with L2-3 Transforaminal Lumbar Interbody Fusion L2-3 posterior lateral instrumentation and fusion;  Surgeon: Aung Gallego MD;  Location: [facility] OR    ORTHOPEDIC SURGERY      hallie knee, L4, c5, shoulder            Social History:     Social History     Tobacco Use    Smoking status: Former     Types: Cigarettes     Quit date:      Years since quittin.7    Smokeless tobacco: Former    Tobacco comments:     smoke cigars   Substance Use Topics    Alcohol use: Yes     Comment: daily- 4 beers, glass of wine, shot of whisky            Family History:   No family history on file.         Allergies:   No Known Allergies         Medications:     Current Outpatient Medications   Medication    allopurinol (ZYLOPRIM) 300 MG tablet    amiodarone (PACERONE) 200 MG tablet    B Complex Vitamins (VITAMIN B COMPLEX PO)    furosemide (LASIX) 20 MG tablet    levothyroxine (SYNTHROID/LEVOTHROID) 175 MCG tablet    nitroglycerin (NITROSTAT) 0.4 MG SL tablet    Pregabalin (LYRICA) 200 MG capsule    sildenafil (VIAGRA) 100 MG tablet    vitamin B-12 (CYANOCOBALAMIN) 1000 MCG tablet    acetaminophen (TYLENOL) 325 MG tablet    atorvastatin (LIPITOR) 80 MG tablet    Calcium  "Carb-Cholecalciferol 600-800 MG-UNIT TABS    methocarbamol (ROBAXIN) 500 MG tablet     No current facility-administered medications for this visit.             Review of Systems:     A 10 point ROS was performed and reviewed. Specific responses to these questions are noted at the end of the document.         Physical Exam:     PHYSICAL EXAM:   Constitutional - Patient is healthy, well-nourished and appears stated age, is in no acute distress    Vitals: Ht 1.65 m (5' 4.96\")   Wt 101.2 kg (223 lb)   BMI 37.15 kg/m     Respiratory - Patient is breathing normally, no audible wheeze or respiratory distress.   Skin - No suspicious rashes or lesions. Healed wound inspected on pt right foot on toe.   Psychiatric - Normal mood and affect.   Cardiovascular - Extremities warm and well perfused.   GI - No abdominal distention.   Musculoskeletal - He looks very stiff and painful when transitioning from sit to stand. Has antalgic gait. He has a walking stick that he uses regularly but is able to stand and walk without it during exam. Able to walk on tip toes and heels with some difficulty. No loss of balance.           Lumbar Spine:    Appearance - decreased lumbar lordosis, fixed positive sagittal balance    Palpation - very tender to palpation over the right greater than left lower lumbar paraspinal muscles down to PSIS    ROM - range of motion is very decreased in lumbar extension. Positive facet loading    Motor -        LOWER EXTREMITY Left Right   Hip flexion 5/5 4/5   Knee flexion 5/5 5/5   Knee extension 5/5 5/5   Ankle dorsiflexion 5/5 4/5   Ankle plantarflexion 5/5 5/5   Great toe extension 5/5 4/5        Special tests -     Straight leg raise - Positive on right recreates pain radiating into right buttopck     Pain with hip ROM - Positive - pain with active flexion of right hip     Neurologic - Sensation intact to light touch bilaterally.               Imaging:   We ordered and independently reviewed new radiographs " at this clinic visit. The results were discussed with the patient.  Findings include:  EOS AP and lateral complete spine radiographs show flatback deformity   PI 46  L1-S1 -11  L4-S1 -10  T1PA 33  PT 23    MRI lumbar spine from 9/21/2023 shows moderate central stenosis at L1-2. At L3-4 there is bilateral lateral recess stenosis with severe foraminal stenosis bilaterally, at L4-5 there is facet effusion, severe right lateral recess and foraminal stenosis and moderate right foraminal stenosis, at L5-S1 severe right lateral recess and moderate bilateral foraminal stenosis               Assessment and Plan:   Assessment:S/P remote L2-S1 laminectomy, more recent 2022 L2-3 TLIF w/ revision decompression at 2-3 presents with chronic lower right back pain and right greater than left radicular pain. 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 Patient is severely disabled due to his symptoms. He would very much like to proceed with surgery if there is reasonable surgical approach.     I have recommended addressing residual neurologic compression as well as lumbosacral flatback deformity by way of multi-level smith fernandez osteotomy and TLIF at L1-2, L3-4, L4-5, L5-S1, instrumented posterior spinal fusion T11 to S1 with bilateral pelvic fixation, use of morselized local autograft bone from the facetectomy, cancellous allograft bone graft extender and bone morphogenic protein from [employer]. Patient will need to see PAC and his cardiologist. Will need to discuss during complex spine multidisciplinary conference. Will require Zachery nutritional supplement 2 weeks preoperative and 4 weeks postoperatively.      Time spent on this clinical encounter including previsit chart review, history and physical examination, patient counseling and documentation was 75 minutes on the date of encounter.    Nacho Zavaleta MD  , Department of Orthopedic Surgery  [facility]  [facility]

## 2023-10-04 NOTE — NURSING NOTE
"Reason For Visit:   Chief Complaint   Patient presents with    Consult     Low back pain, referred Aung Gallego MD       Primary MD: Luciano Hogan  Ref. MD: Dr. Gallego     ?  No  Occupation retired.    Date of injury: No  Type of injury: No    Date of surgery & Type:  1999 Lumbar herniated disc    2016 L2-3 BILATERAL LAMINECTOMY DISCECTOMY, L3-5 DECOMPRESSIVE LAMINECTOMY     10/27/2022 Redo left L2-3 decompression with L2-3 Transforaminal Lumbar Interbody Fusion L2-3 posterior lateral instrumentation and fusion     Smoker: No  Request smoking cessation information: No    Ht 1.65 m (5' 4.96\")   Wt 101.2 kg (223 lb)   BMI 37.15 kg/m      Pain Assessment  Patient Currently in Pain: Yes  0-10 Pain Scale: 9  Primary Pain Location: Back    Oswestry (RUPERTO) Questionnaire        10/2/2023     4:39 PM   OSWESTRY DISABILITY INDEX   Count 10   Sum 39   Oswestry Score (%) 78 %        Visual Analog Pain Scale  Back Pain Scale 0-10: 8.5  Right leg pain: 0  Left leg pain: 0  Neck Pain Scale 0-10: 0  Right arm pain: 0  Left arm pain: 0    Promis 10 Assessment         No data to display                         Magdalena Narayanano, JULISSAN   "

## 2023-10-04 NOTE — PROGRESS NOTES
Teaching Flowsheet   Relevant Diagnosis: Thoracic and Lumbar Spine  Teaching Topic: Surgery     Person(s) involved in teaching:   Patient and Wife     Motivation Level:  Asks Questions: Yes  Eager to Learn: Yes  Cooperative: Yes  Receptive (willing/able to accept information): Yes  Any cultural factors/Lutheran beliefs that may influence understanding or compliance? No       Patient and Family demonstrates understanding of the following:  Reason for the appointment, diagnosis and treatment plan: Yes  Knowledge of proper use of medications and conditions for which they are ordered (with special attention to potential side effects or drug interactions): Yes  Which situations necessitate calling provider and whom to contact: Yes       Teaching Concerns Addressed:        Proper use and care of medication (medical equip, care aids, etc.): Yes  Nutritional needs and diet plan: Yes  Pain management techniques: Yes  Wound Care: Yes  How and/when to access community resources: Yes     Instructional Materials Used/Given: pre op packet, soap, inbody     Time spent with patient: 15 minutes.    Roxy Mejia RN

## 2023-10-04 NOTE — LETTER
10/4/2023         RE: Emile Wood  [id number] [facility] [facility] [id number]        Dear Colleague,    Thank you for referring your patient, Emile Wood, to the [facility]. Please see a copy of my visit note below.    Spine Surgery Consultation    REFERRING PHYSICIAN: Aung Gallego   PRIMARY CARE PHYSICIAN: Luciano Hogan           Chief Complaint:   Consult (Low back pain, referred Julián, Aung Ramos MD)      History of Present Illness:  Symptom Profile Including: location of symptoms, onset, severity, exacerbating/alleviating factors, previous treatments:        Emile Wood is a 72 year old male with remote history of lumbar L2-S1 decompression with Dr Vergara, more recent revision decompression and TLIF at L2-3 with Pablo Gallego who presents today for evaluation of chronic pain of lower back as well as right lower extremity radicular pain in an L5 dermatome.     Pt reports low back pain localized to the right lumbosacral junction for multiple years. Exacerbating factors include walking, prolonged sitting, twisting. Alleviating factors include  heat and deep tissue massage, and leaning forward. Dry needle has been tried at PT with relief that is short term only.    Patient denies saddle anesthesia, loss of bowel or bladder control.     Past treatments tried:  - Physical therapy: Pt has 6 months of PT without improvement to symptoms.  - Injections: Pt has had previous injections without lasting improvements to symptoms.  - Medications: Pt routinely takes oxycodone 2x daily and has done so for multiple years.         Past Medical History:     Past Medical History:   Diagnosis Date    Arthritis     knee and back    CAD (coronary artery disease)     Chronic sinusitis     Gout, chronic     Heart murmur     Hypertension     Irregular heart beat     hx at fib with 3 cardioversions    Numbness and tingling     peripheral neuropathy     Pacemaker     Peripheral neuropathy     Stented coronary artery     stent and CABG    Thyroid disease      Patient Active Problem List   Diagnosis    S/P total knee replacement    S/P lumbar fusion             Past Surgical History:     Past Surgical History:   Procedure Laterality Date    AORTIC VALVE REPLACEMENT          ARTHROPLASTY KNEE  2013    Procedure: ARTHROPLASTY KNEE;  LEFT TOTAL KNEE ARTHROPLASTY ;  Surgeon: Luciano Mcgraw MD;  Location:  OR    [facility]      CARDIAC SURGERY      CABG    COLONOSCOPY N/A 2018    Procedure: COLONOSCOPY;;  Surgeon: John Vilchis MD;  Location:  OR    ESOPHAGOSCOPY, GASTROSCOPY, DUODENOSCOPY (EGD), COMBINED N/A 2018    Procedure: COMBINED ESOPHAGOSCOPY, GASTROSCOPY, DUODENOSCOPY (EGD);  ESOPHAGOSCOPY, GASTROSCOPY, DUODENOSCOPY WITH BIOPSIES AND POLYPECTOMY, COLONOSCOPY WITH POLYPECTOMIES;  Surgeon: John Vilchis MD;  Location:  OR    OPTICAL TRACKING SYSTEM FUSION POSTERIOR SPINE LUMBAR N/A 10/27/2022    Procedure: Redo left L2-3 decompression with L2-3 Transforaminal Lumbar Interbody Fusion L2-3 posterior lateral instrumentation and fusion;  Surgeon: Aung Gallego MD;  Location:  OR    ORTHOPEDIC SURGERY      hallie knee, L4, c5, shoulder            Social History:     Social History     Tobacco Use    Smoking status: Former     Types: Cigarettes     Quit date: 2018     Years since quittin.7    Smokeless tobacco: Former    Tobacco comments:     smoke cigars   Substance Use Topics    Alcohol use: Yes     Comment: daily- 4 beers, glass of wine, shot of whisky            Family History:   No family history on file.         Allergies:   No Known Allergies         Medications:     Current Outpatient Medications   Medication    allopurinol (ZYLOPRIM) 300 MG tablet    amiodarone (PACERONE) 200 MG tablet    B Complex Vitamins (VITAMIN B COMPLEX PO)    furosemide (LASIX) 20 MG tablet    levothyroxine (SYNTHROID/LEVOTHROID) 175 MCG tablet  "   nitroglycerin (NITROSTAT) 0.4 MG SL tablet    Pregabalin (LYRICA) 200 MG capsule    sildenafil (VIAGRA) 100 MG tablet    vitamin B-12 (CYANOCOBALAMIN) 1000 MCG tablet    acetaminophen (TYLENOL) 325 MG tablet    atorvastatin (LIPITOR) 80 MG tablet    Calcium Carb-Cholecalciferol 600-800 MG-UNIT TABS    methocarbamol (ROBAXIN) 500 MG tablet     No current facility-administered medications for this visit.             Review of Systems:     A 10 point ROS was performed and reviewed. Specific responses to these questions are noted at the end of the document.         Physical Exam:     PHYSICAL EXAM:   Constitutional - Patient is healthy, well-nourished and appears stated age, is in no acute distress    Vitals: Ht 1.65 m (5' 4.96\")   Wt 101.2 kg (223 lb)   BMI 37.15 kg/m     Respiratory - Patient is breathing normally, no audible wheeze or respiratory distress.   Skin - No suspicious rashes or lesions. Healed wound inspected on pt right foot on toe.   Psychiatric - Normal mood and affect.   Cardiovascular - Extremities warm and well perfused.   GI - No abdominal distention.   Musculoskeletal - He looks very stiff and painful when transitioning from sit to stand. Has antalgic gait. He has a walking stick that he uses regularly but is able to stand and walk without it during exam. Able to walk on tip toes and heels with some difficulty. No loss of balance.           Lumbar Spine:    Appearance - decreased lumbar lordosis, fixed positive sagittal balance    Palpation - very tender to palpation over the right greater than left lower lumbar paraspinal muscles down to PSIS    ROM - range of motion is very decreased in lumbar extension. Positive facet loading    Motor -        LOWER EXTREMITY Left Right   Hip flexion 5/5 4/5   Knee flexion 5/5 5/5   Knee extension 5/5 5/5   Ankle dorsiflexion 5/5 4/5   Ankle plantarflexion 5/5 5/5   Great toe extension 5/5 4/5        Special tests -     Straight leg raise - Positive on " right recreates pain radiating into right buttopck     Pain with hip ROM - Positive - pain with active flexion of right hip     Neurologic - Sensation intact to light touch bilaterally.               Imaging:   We ordered and independently reviewed new radiographs at this clinic visit. The results were discussed with the patient.  Findings include:  EOS AP and lateral complete spine radiographs show flatback deformity   PI 46  L1-S1 -11  L4-S1 -10  T1PA 33  PT 23    MRI lumbar spine from 9/21/2023 shows moderate central stenosis at L1-2. At L3-4 there is bilateral lateral recess stenosis with severe foraminal stenosis bilaterally, at L4-5 there is facet effusion, severe right lateral recess and foraminal stenosis and moderate right foraminal stenosis, at L5-S1 severe right lateral recess and moderate bilateral foraminal stenosis               Assessment and Plan:   Assessment:S/P remote L2-S1 laminectomy, more recent 2022 L2-3 TLIF w/ revision decompression at 2-3 presents with chronic lower right back pain and right greater than left radicular pain. 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 Patient is severely disabled due to his symptoms. He would very much like to proceed with surgery if there is reasonable surgical approach.     I have recommended addressing residual neurologic compression as well as lumbosacral flatback deformity by way of multi-level smith fernandez osteotomy and TLIF at L1-2, L3-4, L4-5, L5-S1, instrumented posterior spinal fusion T11 to S1 with bilateral pelvic fixation, use of morselized local autograft bone from the facetectomy, cancellous allograft bone graft extender and bone morphogenic protein from [employer]. Patient will need to see PAC and his cardiologist. Will need to discuss during complex spine multidisciplinary conference. Will require Zachery nutritional supplement 2 weeks preoperative and 4 weeks postoperatively.      Time spent on  this clinical encounter including previsit chart review, history and physical examination, patient counseling and documentation was 75 minutes on the date of encounter.    Nacho Zavaleta MD  , Department of Orthopedic Surgery  [facility]          Teaching Flowsheet   Relevant Diagnosis: Thoracic and Lumbar Spine  Teaching Topic: Surgery     Person(s) involved in teaching:   Patient and Wife     Motivation Level:  Asks Questions: Yes  Eager to Learn: Yes  Cooperative: Yes  Receptive (willing/able to accept information): Yes  Any cultural factors/Catholic beliefs that may influence understanding or compliance? No       Patient and Family demonstrates understanding of the following:  Reason for the appointment, diagnosis and treatment plan: Yes  Knowledge of proper use of medications and conditions for which they are ordered (with special attention to potential side effects or drug interactions): Yes  Which situations necessitate calling provider and whom to contact: Yes       Teaching Concerns Addressed:        Proper use and care of medication (medical equip, care aids, etc.): Yes  Nutritional needs and diet plan: Yes  Pain management techniques: Yes  Wound Care: Yes  How and/when to access community resources: Yes     Instructional Materials Used/Given: pre op packet, soap, inbody     Time spent with patient: 15 minutes.    Roxy Mejia RN

## 2023-10-05 ENCOUNTER — TELEPHONE (OUTPATIENT)
Dept: ORTHOPEDICS | Facility: CLINIC | Age: 73
End: 2023-10-05

## 2023-10-05 ENCOUNTER — TELEPHONE (OUTPATIENT)
Dept: ORTHOPEDICS | Facility: CLINIC | Age: 73
End: 2023-10-05
Payer: MEDICARE

## 2023-10-05 PROBLEM — I50.32 CHRONIC DIASTOLIC CHF (CONGESTIVE HEART FAILURE) (H): Status: ACTIVE | Noted: 2023-10-05

## 2023-10-05 PROBLEM — L97.511 SKIN ULCER OF TOE OF RIGHT FOOT, LIMITED TO BREAKDOWN OF SKIN (H): Status: ACTIVE | Noted: 2023-10-05

## 2023-10-05 PROBLEM — G62.1 ALCOHOLIC POLYNEUROPATHY (H): Status: ACTIVE | Noted: 2023-10-05

## 2023-10-05 PROBLEM — I48.91 ATRIAL FIBRILLATION, UNSPECIFIED TYPE (H): Status: ACTIVE | Noted: 2023-10-05

## 2023-10-05 RX ORDER — ACETAMINOPHEN 325 MG/1
975 TABLET ORAL ONCE
Status: CANCELLED | OUTPATIENT
Start: 2023-10-05 | End: 2023-10-05

## 2023-10-05 RX ORDER — CEFAZOLIN SODIUM 2 G/50ML
2 SOLUTION INTRAVENOUS
Status: CANCELLED | OUTPATIENT
Start: 2023-10-05

## 2023-10-05 RX ORDER — GABAPENTIN 100 MG/1
100 CAPSULE ORAL
Status: CANCELLED | OUTPATIENT
Start: 2023-10-05

## 2023-10-05 RX ORDER — CEFAZOLIN SODIUM 2 G/50ML
2 SOLUTION INTRAVENOUS SEE ADMIN INSTRUCTIONS
Status: CANCELLED | OUTPATIENT
Start: 2023-10-05

## 2023-10-05 NOTE — TELEPHONE ENCOUNTER
[facility] Call Center    Phone Message    May a detailed message be left on voicemail: yes     Reason for Call: Other: Patient is requesting a call back as he has a few questions:     Should patient rely on walker around the house?    Does the doctor's team do the legwork of getting the insurance company to approve the surgery?    Please call patient to discuss the above.    Action Taken: Message routed to:  [facility] ([facility] ortho    Travel Screening: Not Applicable

## 2023-10-05 NOTE — TELEPHONE ENCOUNTER
RN called patient to discuss questions pertaining to surgery and post operative care. Patient presented to the clinic yesterday with a walking stick he used for ambulation support. Patient would benefit from a walker for better bilateral support rather than unilateral after surgery. Patient will also be visited with PT and OT in the hospital to help decipher which assistive device would be the best at home.     Patient was scheduled for PAC appointment next week so he could be discussed at Complex Spine Conference. Patient will be contacted after the conference to discuss best plan of care going forward with surgery.     Patient had no other questions at this time.     Roxy Mejia RN

## 2023-10-05 NOTE — TELEPHONE ENCOUNTER
Phoned patient to get him schedule with PAC for a risk eval for the upcoming Complex Spine conference, per care team.   Patient I snow schedule for PAC on 10/10/23 at 10:30 AM.    Patient is aware that he will need to reach out to his cardiologist and sony clearance from his provider as well as clearance from PAC prior to surgery.     Surgery date: TBD.     Patient had no other questions or concerns.

## 2023-10-07 NOTE — TELEPHONE ENCOUNTER
FUTURE VISIT INFORMATION      SURGERY INFORMATION:  TBD; Risk eval-Complex spine conference   Consult: ov 10/4    RECORDS REQUESTED FROM:       Primary Care Provider:Luciano Hogan MD  - [place]    Pertinent Medical History: CHF, atrial fibrillation    Most recent EKG+ Tracing: 10/4/23    Most recent ECHO: 12/1/17

## 2023-10-09 LAB
ABO/RH(D): NORMAL
ANTIBODY SCREEN: NEGATIVE
SPECIMEN EXPIRATION DATE: NORMAL

## 2023-10-10 ENCOUNTER — OFFICE VISIT (OUTPATIENT)
Dept: SURGERY | Facility: CLINIC | Age: 73
End: 2023-10-10
Payer: MEDICARE

## 2023-10-10 ENCOUNTER — ANESTHESIA EVENT (OUTPATIENT)
Dept: SURGERY | Facility: CLINIC | Age: 73
End: 2023-10-10

## 2023-10-10 ENCOUNTER — PRE VISIT (OUTPATIENT)
Dept: SURGERY | Facility: CLINIC | Age: 73
End: 2023-10-10

## 2023-10-10 ENCOUNTER — LAB (OUTPATIENT)
Dept: LAB | Facility: CLINIC | Age: 73
End: 2023-10-10
Payer: MEDICARE

## 2023-10-10 VITALS
WEIGHT: 226.2 LBS | TEMPERATURE: 97.7 F | RESPIRATION RATE: 16 BRPM | HEART RATE: 72 BPM | BODY MASS INDEX: 37.69 KG/M2 | OXYGEN SATURATION: 95 % | HEIGHT: 65 IN | DIASTOLIC BLOOD PRESSURE: 80 MMHG | SYSTOLIC BLOOD PRESSURE: 127 MMHG

## 2023-10-10 DIAGNOSIS — M40.37 FLATBACK SYNDROME OF LUMBOSACRAL REGION: ICD-10-CM

## 2023-10-10 DIAGNOSIS — Z01.818 PREOP EXAMINATION: ICD-10-CM

## 2023-10-10 DIAGNOSIS — Z01.818 PREOP EXAMINATION: Primary | ICD-10-CM

## 2023-10-10 PROCEDURE — 86850 RBC ANTIBODY SCREEN: CPT | Performed by: CLINICAL NURSE SPECIALIST

## 2023-10-10 PROCEDURE — 99204 OFFICE O/P NEW MOD 45 MIN: CPT | Performed by: CLINICAL NURSE SPECIALIST

## 2023-10-10 PROCEDURE — 36415 COLL VENOUS BLD VENIPUNCTURE: CPT | Performed by: PATHOLOGY

## 2023-10-10 PROCEDURE — 86901 BLOOD TYPING SEROLOGIC RH(D): CPT | Performed by: CLINICAL NURSE SPECIALIST

## 2023-10-10 RX ORDER — AMOXICILLIN 500 MG/1
2000 CAPSULE ORAL PRN
COMMUNITY
Start: 2022-03-27

## 2023-10-10 RX ORDER — CLOPIDOGREL BISULFATE 75 MG/1
75 TABLET ORAL EVERY MORNING
COMMUNITY
Start: 2023-09-01 | End: 2024-03-01

## 2023-10-10 RX ORDER — ASPIRIN 81 MG/1
81 TABLET, CHEWABLE ORAL EVERY MORNING
COMMUNITY
Start: 2023-09-01

## 2023-10-10 RX ORDER — AMLODIPINE BESYLATE 10 MG/1
1 TABLET ORAL EVERY MORNING
Status: ON HOLD | COMMUNITY
Start: 2018-03-19 | End: 2024-01-02

## 2023-10-10 RX ORDER — OXYCODONE AND ACETAMINOPHEN 10; 325 MG/1; MG/1
1 TABLET ORAL EVERY 6 HOURS PRN
Status: ON HOLD | COMMUNITY
Start: 2019-07-08 | End: 2023-12-29

## 2023-10-10 ASSESSMENT — PAIN SCALES - GENERAL: PAINLEVEL: SEVERE PAIN (6)

## 2023-10-10 ASSESSMENT — LIFESTYLE VARIABLES: TOBACCO_USE: 1

## 2023-10-10 ASSESSMENT — ENCOUNTER SYMPTOMS
DYSRHYTHMIAS: 1
SEIZURES: 0

## 2023-10-10 NOTE — H&P
Pre-Operative H & P/Anesthesia Consult    CC:  Preoperative exam to assess for increased cardiopulmonary risk while undergoing surgery and anesthesia.    Date of Encounter: 10/10/2023  Primary Care Physician:  Luciano Hogan     Reason for visit:   Encounter Diagnoses   Name Primary?    Flatback syndrome of lumbosacral region     Preop examination Yes       HPI  Emile Wood is a 72 year old male who presents for pre-operative H & P in preparation for  Procedure Information       Date/Time: TBD     Procedure:   reinstrumentation L2-3, posterior spinal fusion T11-S1, bilateral pelvic fixation (Preble), SPO and TLIF L1-2, L3-4, L4-5, L5-S1, local autograft, allograft bone and BMP, neuromonitoring, cell saver, stereotactic navigation    Anesthesia type: General    Pre-op diagnosis: : S/P lumbar fusion [Z98.1]  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]  Lumbar adjacent segment disease with spondylolisthesis [M51.36, M43.16]  Chronic diastolic CHF (congestive heart failure) (H) [I50.32]  Atrial fibrillation, unspecified type (H) [I48.91]    Location: [facility]    Providers: Dr. Zavaleta          History is obtained from the patient, wife and chart review    Patient who was recently evaluated by Dr. Zavaleta for low back pain localized to the right lumbosacral junction for multiple years. He is s/p history of lumbar L2-S1 decompression with Dr Vergara, and more recent revision decompression and TLIF at L2-3 with Pablo Gallego. Exacerbating factors for pain include walking, prolonged sitting, and twisting. Alleviating factors include heat, deep tissue massage, and leaning forward. Dry needle has been tried at PT with relief that is short term only. He has had previous injections without lasting improvement. He routinely takes oxycodone 2x daily and has done so for multiple years. His work up revealed lumbosacral flatback deformity and adjacent  "segment degeneration above and below L2-3. Patient is severely disabled due to his symptoms and desired surgery. He was counseled for above complex spine procedure.     Patient's history is otherwise complex including persistent Afib previously on Eliquis, and amiodarone, severe AS s/p TAVR 5/2021, chronic diastolic congestive heart failure, CAD with hx MI, CABG in 2002 with subesquent PCI of mLCX and OM2, HTN, HLD, elevated BMI, SSS with new LBBB s/p PPM, and carotid disease. From 8/15/23 cardiology note \"he was evaluated in [facility] by Dr. Arthur Emery and was determined not to be an ideal candidate for long term anticoagulation. He was felt to be an appropriate candidate to pursue closure of the left atrial appendage as a safe and effective alternative to oral anticoagulant therapy for stroke prophylaxis and to reduce long term risk of incidence of bleeding or complications from anticoagulation. He presented on 7/13/2023 for left atrial appendage closure procedure which was successful with a 35 mm Watchman FLX device. Procedure was completed with intra-cardiac echo (ICE) and fluoroscopic guidance. Procedure and recovery were uncomplicated. He was hemodynamically stable and discharged to home same day of procedure\"     Patient was instructed by Dr. Zavaleat's team that he would need a cardiac preop evaluation from his team prior to above surgery. This has been scheduled on 11/27/23.    He was also referred to the [facility] Assessment Center today for anesthesia consult    Hx of abnormal bleeding or anti-platelet use: Denies       Past Medical History  Past Medical History:   Diagnosis Date    Alcoholic polyneuropathy (H24)     Arthritis     knee and back    CAD (coronary artery disease)     Carotid artery stenosis     Chronic diastolic CHF (congestive heart failure) (H)     Chronic sinusitis     Flatback syndrome of lumbosacral region     Gout, chronic     Heart murmur     Hypertension     " Irregular heart beat     hx at fib with 3 cardioversions    Numbness and tingling     peripheral neuropathy    Pacemaker     Peripheral neuropathy     Stented coronary artery     stent and CABG    Thyroid disease     Unspecified atrial fibrillation (H)        Past Surgical History  Past Surgical History:   Procedure Laterality Date    AORTIC VALVE REPLACEMENT      2021    ARTHROPLASTY KNEE  04/29/2013    Procedure: ARTHROPLASTY KNEE;  LEFT TOTAL KNEE ARTHROPLASTY ;  Surgeon: Luciano Mcgraw MD;  Location: [facility] OR    cabg      CARDIAC SURGERY  2002    CABG    COLONOSCOPY N/A 05/02/2018    Procedure: COLONOSCOPY;;  Surgeon: John Vilchis MD;  Location:  OR    ESOPHAGOSCOPY, GASTROSCOPY, DUODENOSCOPY (EGD), COMBINED N/A 05/02/2018    Procedure: COMBINED ESOPHAGOSCOPY, GASTROSCOPY, DUODENOSCOPY (EGD);  ESOPHAGOSCOPY, GASTROSCOPY, DUODENOSCOPY WITH BIOPSIES AND POLYPECTOMY, COLONOSCOPY WITH POLYPECTOMIES;  Surgeon: John Vilchis MD;  Location:  OR    OPTICAL TRACKING SYSTEM FUSION POSTERIOR SPINE LUMBAR N/A 10/27/2022    Procedure: Redo left L2-3 decompression with L2-3 Transforaminal Lumbar Interbody Fusion L2-3 posterior lateral instrumentation and fusion;  Surgeon: Aung Gallego MD;  Location:  OR    ORTHOPEDIC SURGERY      hallie knee, L4, c5, shoulder       Prior to Admission Medications  Current Outpatient Medications   Medication Sig Dispense Refill    acetaminophen (TYLENOL) 325 MG tablet Take 650 mg by mouth as needed      allopurinol (ZYLOPRIM) 300 MG tablet Take 300 mg by mouth every morning      amiodarone (PACERONE) 200 MG tablet Take 200 mg by mouth every morning      amLODIPine (NORVASC) 10 MG tablet Take 1 tablet by mouth every morning      amoxicillin (AMOXIL) 500 MG capsule Take 2,000 mg by mouth as needed      aspirin (ASA) 81 MG chewable tablet Take 81 mg by mouth every morning      atorvastatin (LIPITOR) 80 MG tablet Take 80 mg by mouth At Bedtime      B Complex Vitamins (VITAMIN B  COMPLEX PO) Take 1 tablet by mouth every morning      Calcium Carb-Cholecalciferol 600-800 MG-UNIT TABS Take 1 tablet by mouth every morning      clopidogrel (PLAVIX) 75 MG tablet Take 75 mg by mouth every morning      furosemide (LASIX) 20 MG tablet Take 60 mg by mouth every morning      levothyroxine (SYNTHROID/LEVOTHROID) 175 MCG tablet Take 175 mcg by mouth every morning      nitroglycerin (NITROSTAT) 0.4 MG SL tablet Place 1 tablet under the tongue every 5 minutes as needed for chest pain (CALL 911 IF NOT IMPROVED AFTER THREE CONSECUTIVE DOSES). 25 tablet 0    oxyCODONE-acetaminophen (PERCOCET)  MG per tablet Take 1 tablet by mouth every 6 hours as needed      Pregabalin (LYRICA) 200 MG capsule Take 2 capsules in the morning and  one capsule in the evening      vitamin B-12 (CYANOCOBALAMIN) 1000 MCG tablet Take 1,000 mcg by mouth every morning      sildenafil (VIAGRA) 100 MG tablet Take 100 mg by mouth daily as needed (sexual activity) (Patient not taking: Reported on 10/10/2023)         Allergies  No Known Allergies    Social History  Social History     Socioeconomic History    Marital status:      Spouse name: Not on file    Number of children: Not on file    Years of education: Not on file    Highest education level: Not on file   Occupational History    Not on file   Tobacco Use    Smoking status: Former     Types: Cigarettes     Quit date: 2018     Years since quittin.7    Smokeless tobacco: Former    Tobacco comments:     smoke cigars   Vaping Use    Vaping Use: Never used   Substance and Sexual Activity    Alcohol use: Yes     Alcohol/week: 35.0 - 42.0 standard drinks of alcohol     Types: 35 - 42 Standard drinks or equivalent per week     Comment: daily- 5 beers, glass of wine, shot of whiskey    Drug use: No    Sexual activity: Not on file   Other Topics Concern    Not on file   Social History Narrative    Not on file     Social Determinants of Health     Financial Resource Strain: Not  on file   Food Insecurity: Not on file   Transportation Needs: Not on file   Physical Activity: Not on file   Stress: Not on file   Social Connections: Not on file   Interpersonal Safety: Not on file   Housing Stability: Not on file       Family History  Family History   Problem Relation Age of Onset    Cerebrovascular Disease Mother     Cerebrovascular Disease Father     Lung Cancer Father     Heart Disease Brother        Review of Systems  The complete review of systems is negative other than noted in the HPI or here.   Anesthesia Evaluation   Pt has had prior anesthetic. Type: General and MAC.    No history of anesthetic complications       ROS/MED HX  ENT/Pulmonary:     (+)     SUHAS risk factors,  hypertension, obese,        tobacco use, Past use,                   (-) recent URI   Neurologic: Comment: Lumbar adjacent segment disease with spondylolisthesis   Flatback syndrome    History cervical spine surgery    (+)    peripheral neuropathy,                         (-) no seizures and no CVA   Cardiovascular: Comment: Mild 4.2 cm fusiform dilation of the ascending thoracic aorta, similar to prior.     A fib s/p cardioversion X3  Watchman device        (+) Dyslipidemia hypertension-range: controlled/ Peripheral Vascular Disease-- Other and Carotid Stenosis.  CAD - past MI CABG-date: 2002. stent-after CAB. 3  Taking blood thinners Pt has not received instructions: Instructions Given to patient: ASA 81 mg and Plavix daily. CHF etiology: chronic diastolic Last EF: 60% date: 7/13/23     pacemaker, Reason placed: SA node dysfunction. type: [employer]tronic, settings: AAIR-DDDR , - Patientt is not dependent on pacemaker.      dysrhythmias, a-fib,  valvular problems/murmurs type: AS s/p TAVR 2021.    Previous cardiac testing   Echo: Date: 7/13/2023 Results:    Stress Test:  Date: 2013 Results:    ECG Reviewed:  Date: 7/13/23 Results:  Atrial paced rhythm, prolonged AV conduction  Cath:  Date: 2021 Results:   (-) PETTIT  "  METS/Exercise Tolerance: 1 - Eating, dressing Comment: Able to walk one block with walking stick or walker   Hematologic: Comments: Gout   (-) history of blood clots and history of blood transfusion   Musculoskeletal: Comment: S/p knee replacement  (+)  arthritis,             GI/Hepatic:    (-) GERD   Renal/Genitourinary:  - neg Renal ROS     Endo:     (+)          thyroid problem, hypothyroidism,           Psychiatric/Substance Use: Comment: 5-6 drinks daily    (+)   alcohol abuse H/O chronic opiod use .     Infectious Disease:  - neg infectious disease ROS     Malignancy:  - neg malignancy ROS     Other:  - neg other ROS    (+)  , H/O Chronic Pain,         /80 (BP Location: Right arm, Patient Position: Sitting, Cuff Size: Adult Regular)   Pulse 72   Temp 97.7  F (36.5  C) (Oral)   Resp 16   Ht 1.651 m (5' 5\")   Wt 102.6 kg (226 lb 3.2 oz)   SpO2 95%   BMI 37.64 kg/m      Physical Exam   Constitutional: Awake, alert, cooperative, no apparent distress, and appears stated age. Accompanied by wife. Reports claustrophobia and asks for the door to remain slightly open.  Eyes: Pupils equal, round and reactive to light, extra ocular muscles intact, sclera clear, conjunctiva normal.  HENT: Normocephalic, oral pharynx with moist mucus membranes, good dentition. No goiter appreciated.   Respiratory: Clear to auscultation bilaterally, no crackles or wheezing. No cough or obvious dyspnea.  Cardiovascular: Regular rate and rhythm, 3/6 systolic murmur noted heard best at left sternal border. Carotids +2, no bruits. Trace bilateral lower extremity edema.  edema.Palpable pulses to radial  DP and PT arteries. Well healed incision left inner forearm where bypass graft taken. Radial pulse more difficult to feel.  GI: Normal bowel sounds, soft, non-distended, non-tender, no masses palpated, small soft hernia present near umbilicus on right side. Nontender, reducible.   Lymph/Hematologic: No cervical lymphadenopathy and " no supraclavicular lymphadenopathy.  Genitourinary:  Deferred.   Skin: Warm and dry.  No rashes at anticipated surgical site. Well healed lower back incision. Upper left chest pacer site.   Musculoskeletal: Full ROM of neck. There is no redness, warmth, or swelling of the joints. Gross motor strength is weakened.  Neurologic: Awake, alert, oriented to name, place and time. Cranial nerves II-XII are grossly intact. Gait is cautious, walks with walking stick.   Neuropsychiatric: Calm, cooperative. Normal affect.     Prior Labs/Diagnostic Studies   All labs and imaging personally reviewed     OSH 10/3/23  Hgb: 14.3  Cr 0.78  K 4.5  TSH 3.39  Uric acid 5.0    Last liver panel 5/23/23 normal    7/13/23 EKG: Atrial paced rhythm, prolonged AV conduction    Device check 8/15/23  Normal dual chamber pacemaker function. Patient presents for routine follow-up after seeing Cara Parra PA-C for WatHelloFaxan implant follow-up ahead of today's device check. No new symptoms related to device. The device was interrogated and reprogrammed to assess data, thresholds, and function. No new atrial or ventricular high rate episodes noted. Pt continues on Eliquis for now but had recent Watchman implant. RV paced 2.4%. Battery voltage WNL. [facility] transmission every 3 months, RTC in 1 year.     PACEART being scanned into [facility]    CTA chest 8/30/23 Watchman device  Watchman device is seen well seated in the os of the left atrial appendage. Diameter measures 2 mm. Depth appears to be 28.7 mm from OS to tip of the device. Contrast imaging does not show any significant amount of contrast beyond the device or swirling of contrast. Delayed imaging demonstrates avg hfu under 100 suggesting no significant leak of contrast beyond the device into the MICHAEL.     General Findings:   Coronary atherosclerosis noted. 29 mm Hartman Jovany 3 bioprosthetic valve noted. Dual chamber PPM noted.     IMPRESSION:   1. Please see separate cardiology dictation for  interpretation of cardiac and/or coronary artery findings.   2. Bilateral bronchial wall thickening suggesting bronchitis.   3. Mild 4.2 cm fusiform dilation of the ascending thoracic aorta, similar to prior.   4. Prior granulomatous disease.     4/21/23 Echocardiogram  CONCLUSIONS   h/o TAVR with normal valve function on today's study.   Left ventricular ejection fraction is visually estimated at 60%.   Mild dilation of the aorta is present involving the ascending aorta.   (Maximal dimension 4.4 cm).     No significant change, comparing to echo of Apr 2022.       7/13/23 Limited echocardiogram   CONCLUSIONS   Limited echo. S/P Watchman.     1. Normal left ventricular size and global and regional function.   Ejection fraction is visually estimated at 60%.   2. Normal right ventricle size and normal global function.   3. Trivial pericardial effusion.     Compared with the previous study dated 4/21/23, trivial pericardial   effusion.       2019 Carotid US     <50% stenosis within the right and left internal carotid arteries.     2021 Cath  Conclusions    1. Severe native coronary artery atherosclerosis.     2. Coronary artery calcification.     3. Patent stents in the first obtuse marginal branch.     4. Patent bypass grafts, including LIMA to left anterior descending   artery   and free radial artery graft to right posterior descending artery.     5. No significant change compared to completion angiography from   3-3-2018.       Diagnostic Findings     * Coronary angiography shows right dominance.     * Left Main has no disease.     * Proximal Left Anterior Descending: moderate  50% stenosis, heavily   calcified, FER: 3 flow.     * Mid Left Anterior Descending: total occlusion, chronic total occlusion,   heavily calcified, FER: 0 flow.     * 1st Diagonal: moderate  60% stenosis, FER: 3 flow.     * Circumflex has no disease. Previously placed stents are patent.     * Proximal Right Coronary Artery: total occlusion,  chronic total   occlusion,   heavily calcified, FER: 0 flow.     * Left Internal Mammary Artery to Mid Left Anterior Descending graft:   patent.    * Ascending Aorta to Posterior Descending Right graft: patent.       10/4/23 EOS total body                                                       Impression:  1. Stable posterior instrumentation at L2-3 with intact hardware.  2. No substantial coronal curvature of the spine.    3. Very positive global sagittal imbalance. Positive global coronal  imbalance.  4. Weight bearing axis as detailed above.    10/4/23 Xray lumbar spine                                                           Impression:  1. No acute osseous abnormality.  2. Stable posterior instrumentation at L2-3 with intact hardware. No  dynamic instability.  3. Multilevel lumbar spondylosis.    9/21/23 MR lumbar spine  IMPRESSION:      1. Comparison to examination of 8/19/2022.   2. Slight progression of now severe bilateral neural foraminal stenosis at L3-4. Correlate with bilateral L3 radicular symptoms.   3. Interval decrease in the degree of neural foraminal narrowing at the L2-3 level with mild neural foraminal narrowing on today's study.   4. Nonspecific inflammatory changes associated with the left facetectomy site at L2-3.       The patient's records and results personally reviewed by this provider.     Outside records reviewed from: Care Everywhere    LAB/DIAGNOSTIC STUDIES TODAY:  Type and screen drawn today for screening.    Assessment    Emile Wood is a 72 year old male seen as a PAC referral for risk assessment and optimization for anesthesia.    Plan/Recommendations  Pt will be optimized for the proposed procedure.  See below for details on the assessment, risk, and preoperative recommendations    NEUROLOGY  - No history of TIA, CVA or seizure  - Chronic pain. Chronic opioid use. Oxycodone daily. On Robaxin prn and Lyrica TWICE DAILY  - Polyneuropathy, chart history alcoholic    -Post  "Op delirium risk factors:  High co-morbid index    ENT  - No current airway concerns.  Will need to be reassessed day of surgery.  Mallampati: II  TM: > 3  History of cervical spine surgery.     CARDIAC  Complex cardiac history as above with plan for cardiac preop evaluation prior to surgery. All testing above. Cardiac meds: amiodarone, atorvastatin, Lasix, ASA 81 and Plavix. Patient agreed to discuss holding ASA and Plavix for surgery with his cardiologist. Permanent pacemaker with last device check above. Denies chest pain, irregular HR or DYSPNEA ON EXERTION. Activity very limited by pain. Able to walk a block with walking stick or walker. Denies exertional symptoms.   - METS (Metabolic Equivalents)<4    RCRI: 6.6% risk of serious cardiac events    PULMONARY  Denies asthma, cough or shortness of breath  Intermediate to high risk for SUHAS. Patient denies concern. Sleeps on side.     - Tobacco History    History   Smoking Status    Former    Types: Cigarettes    Quit date: 2018   Smokeless Tobacco    Former       GI: Denies GERD  PONV Low Risk  Total Score: 1           1 AN PONV: Patient is not a current smoker        /RENAL  - Baseline Creatinine  0.78    ENDOCRINE    - BMI: Estimated body mass index is 37.64 kg/m  as calculated from the following:    Height as of this encounter: 1.651 m (5' 5\").    Weight as of this encounter: 102.6 kg (226 lb 3.2 oz).  Obesity (BMI >30)  - No history of Diabetes Mellitus  10/3/23 A1C 5.9  - Hypothyroidism, on Synthroid    HEME  VTE Low Risk 0.26%            Total Score: 0      Denies personal or family history of blood clots  Denies history of blood transfusion   Hgb: 14.3    MSK Frailty score 1/5  Shoulder pain and limited mobility  Gout, on allopurinol     PSYCH: Reports claustrophobia    ETOH 5-6 drinks daily. Asked patient to try to cut down prior to surgery and he agreed.       Different anesthesia methods/types have been discussed with the patient, but they are aware that " "the final plan will be decided by the assigned anesthesia provider on the date of service.  Patient was discussed with Dr. Rhodes and Dr. Zavaleta.     The patient is optimized for their procedure after cardiac evaluation and approval for surgery. Will follow results.      Informed patient that he will need an updated H and P and labs after surgery is scheduled.     ADDENDUM: 11/30/23   Patient was evaluated by cardiology on 11/15/2023. From notes:  \"ASSESSMENT:     Persistent atrial fibrillation with elevated thromboembolic   -EKG A paced  -Recent pacer interrogator No atrial or ventricular high rate episodes noted.     -maintained on Amiodarone 200 mg daily  --status post successful left atrial appendage closure with 35 mm Watchman FLX device 7/13/23.  Previously on Eliquis, now on 81 ASA daily and Plavix     -(CHADS2-VASc 3) and   -bleeding risk (HAS-BLED 3)     Severe symptomatic aortic stenosis   -s/p TAVR 5/26/2021 with 29 mm Hartman Jovany 3 bioprosthetic valve  SSS s/p dual chamber PPM      Coronary artery disease.    --S/p 2-vessel CABG in 2002 at [facility] (LIMA-LAD, radial-rPDA)    --S/p PCI to proximal to mid LCx with overlapping 3.5 x 16 mm and 3.0 x 28 mm Synergy BLANCA and OM2 with 3.0 x 12 mm Synergy to OM2 on 3/3/18 in setting of STEMI. His grafts were widely patent.      -ASA 81 mg daily  -Lipitor 80 mg   -Lasix 60 mg daily ( 40 mg in AM and 20 mg in afternoon)     --On statin.   -No ASA due to current use of anticoaguluation.       Chronic diastolic heart failure.    -euvolemic on exam  -echocardiogram EF 60%  -weight 226 lbs     Hypertension  -controlled  -Norvasc 10 mg daily     PLAN:  -will touch base with Dr. Emery regarding stopping Plavix almost a month early for possible back surgery     -His last angiogram was 2021. He remains asymptomatic and no angina equivalent.       - Last echocardiogram 4/21/23;TAVR with normal valve function on today's study.  Left ventricular ejection fraction " "is visually estimated at 60%. Mild dilation of the aorta is present involving the ascending aorta.  (Maximal dimension 4.4 cm).      -He continues to remain at risk given his cardiac history.  No further cardiac testing needed at this time,  He is maximized on his cardiac medications.      --  Continue ASA 81 mg daily.    -Plavix for a total of 3 months (1/13/24)     --  postpone dental visits for 6 months post Watchman implant.    -You will require antibiotics prior to any dental appointments.        -Follow up with me in February for 6 month post Watchman vis      -- Continue to check daily weights   -Update clinic if weight increases 3 lb overnight or 5 lb over the course of a week \"        On the day of service:     Prep time: 15 minutes  Visit time: 16 minutes  Documentation time: 15 minutes  ------------------------------------------  Total time: 46 minutes      KARLA Woodson CNS  Preoperative Assessment Center  [facility]  Clinic and Surgery Center  Phone: [phone]  Fax: [phone]    "

## 2023-10-11 ENCOUNTER — TEAM CONFERENCE (OUTPATIENT)
Dept: ORTHOPEDICS | Facility: CLINIC | Age: 73
End: 2023-10-11

## 2023-10-11 NOTE — TELEPHONE ENCOUNTER
Complex spine case review    Date of meeting:  10/11/2023   Presented by:   Dr. Lila MD  Recorded by:   Nati Miller PA-C     *Final Report*    [facility]    Emile TRUJILLO Israel was discussed at the Complex Spine Case Review.  Representatives from Spine Surgery, Neurosurgery, Radiology, PMR, psychology, and Anesthesia were present at the meeting.    Concerns:  Multiple medical comorbidities  Flatback, post-laminectomy syndrome, osteoporosis based on opportunistic BMD of 95 HU    Recommendation:  Bone health referral in  Various surgical approaches discussed  Will await cardiology follow up for further recommendations    Nati Miller PA-C

## 2023-10-13 ENCOUNTER — ANCILLARY PROCEDURE (OUTPATIENT)
Dept: CT IMAGING | Facility: CLINIC | Age: 73
End: 2023-10-13
Attending: ORTHOPAEDIC SURGERY
Payer: MEDICARE

## 2023-10-13 ENCOUNTER — TELEPHONE (OUTPATIENT)
Dept: ORTHOPEDICS | Facility: CLINIC | Age: 73
End: 2023-10-13
Payer: MEDICARE

## 2023-10-13 DIAGNOSIS — M40.37 FLATBACK SYNDROME OF LUMBOSACRAL REGION: ICD-10-CM

## 2023-10-13 DIAGNOSIS — Z98.1 S/P LUMBAR FUSION: ICD-10-CM

## 2023-10-13 DIAGNOSIS — M51.369 LUMBAR ADJACENT SEGMENT DISEASE WITH SPONDYLOLISTHESIS: ICD-10-CM

## 2023-10-13 DIAGNOSIS — M43.16 LUMBAR ADJACENT SEGMENT DISEASE WITH SPONDYLOLISTHESIS: ICD-10-CM

## 2023-10-13 PROCEDURE — 72131 CT LUMBAR SPINE W/O DYE: CPT | Mod: MF

## 2023-10-13 NOTE — TELEPHONE ENCOUNTER
[facility] Call Center    Phone Message    May a detailed message be left on voicemail: yes     Reason for Call: Other: patient calling Roxy told him to call if he had questions and he had CT and Blood test and etc. He wants to know when he can be scheduled for surgery. s     Action Taken: Other: message sent to team     Travel Screening: Not Applicable

## 2023-10-16 NOTE — TELEPHONE ENCOUNTER
Patient called and let know that Roxy is out of the office today but she will give him a call tomorrow when she returns. Patient was grateful for the update.  Latonia Ramirez ATC

## 2023-10-17 NOTE — TELEPHONE ENCOUNTER
RN called patient to assess questions. Patient wanting to be scheduled with Dr. Zavaleta for surgery as soon as he can. He had an appointment with his Cardiologist whom stated they will move his Cardiology appointments around his spine procedure. Patient also stating he had a fall last week in his living room, since then he has had increased pain.     CT scan showing  Postsurgical changes posterior fusion from L2-L3 and interbody fusion at L2/L3. No hardware complication. Laminectomies at L2-L4 and Inferior endplate L1 compression fracture with 15% vertebral body height loss, age-indeterminate, though new since 09/14/2022.     Routing to team. Also scheduled patient a telephone visit with Dr. Zavaleta tomorrow.     Roxy Mejia RN

## 2023-10-18 ENCOUNTER — VIRTUAL VISIT (OUTPATIENT)
Dept: ORTHOPEDICS | Facility: CLINIC | Age: 73
End: 2023-10-18
Payer: MEDICARE

## 2023-10-18 DIAGNOSIS — Z98.1 S/P LUMBAR FUSION: ICD-10-CM

## 2023-10-18 DIAGNOSIS — M51.369 LUMBAR ADJACENT SEGMENT DISEASE WITH SPONDYLOLISTHESIS: ICD-10-CM

## 2023-10-18 DIAGNOSIS — M40.37 FLATBACK SYNDROME OF LUMBOSACRAL REGION: Primary | ICD-10-CM

## 2023-10-18 DIAGNOSIS — M43.16 LUMBAR ADJACENT SEGMENT DISEASE WITH SPONDYLOLISTHESIS: ICD-10-CM

## 2023-10-18 PROCEDURE — 99443 PR PHYSICIAN TELEPHONE EVALUATION 21-30 MIN: CPT | Mod: 95 | Performed by: ORTHOPAEDIC SURGERY

## 2023-10-18 NOTE — PROGRESS NOTES
ADDENDUM RE ANTICOAG MANAGEMENT PERIOPERATIVELY:    Patient went to Cardiology event, they cleared patient for surgery. Per Cardiologist:     -Spoke with Dr. Emery- [facility] regarding stopping Plavix for back surgery. He stated any surgery date should be fine, in which he will be at least 5 months out from Watch[facility]. He should stay on low-dose aspirin brenda-operatively, and resume Plavix after the surgery to complete the 6 months s/p Watchman. (Until 1/13/24)     Roxy Mejia RN          Chief concern: Follow-up advanced imaging, discussion of potential surgery going forward\    History of present illness:  Emile Wood is a 72 year old male with remote history of lumbar L2-S1 decompression with Dr Vergara, more recent revision decompression and TLIF at L2-3 with Pablo Gallego who presents today for evaluation of chronic pain of lower back as well as right lower extremity radicular pain in an L5 dermatome.      Following her previous appointment we obtained full-length spine radiographs on the EOS as well as CT of the lumbar spine.    He reports that his symptoms continue to progress.  He did have mechanical fall approximately 2 weeks ago and had increased pain since that time.  He is able to bear weight but is just very restricted by low back pain.  He denies any bowel or bladder symptoms    The patient and his wife were contacted by telephone today.  They had several well-informed questions regarding what potential surgery would look like.  We discussed both the indications for surgery for the patient which include adjacent segment degeneration, decompensated lumbar flatback deformity, multilevel degenerative disc disease lumbar stenosis, lumbar radiculopathy.  His symptoms of failed to respond to appropriate course of nonoperative measures.  Today his VAS pain score for back was 7/10.  His Oswestry disability index today is 78%.    Pelvic incidence measures 46 degrees, L1-S1 lordosis is -11 L4-S1  lordosis -10, T1 pelvic angle is 33 degrees and pelvic tilt is 23 degrees.  Sagittal vertical axis is 136 mm    On MRI he has moderate stenosis at L1-2 with loss of CSF signal within the thecal sac, large disc herniations at L3-4 L4-5 and L5-S1 with corresponding impingement of the neural elements    Impression and plan:  72-year-old male with prior L2-S1 decompressive laminectomy, subsequent revision decompression with TLIF at L2-3 with lumbar stenosis, decompensated lumbar flatback deformity, fixed sagittal imbalance, lumbar radiculopathy.  Recommended revision surgery to address his spinal deformity as well as decompress the neural elements.  I recommended instrumented fusion from T11 down to S1 to with bilateral pelvic fixation, potential Smith-Plummer osteotomy and transforaminal lumbar interbody fusion at L1-2, L3-4, L4-5, L5-S1 to recreate intervertebral disc space and harmonious lumbar lordosis.  Would use local autograft bone, crushed cancellous allograft from [employer] as well as bone morphogenic protein to promote osseous union to a long fusion construct given multiple prior surgeries in this space.  We discussed that postoperative hospitalization would likely be 5 to 7 days potentially need discharge to a transitional care unit.  I discussed that the risk of a preoperative complication is likely 50% with this surgery and this could include anything from minor complication such as urinary tract infection up to and including cardiac arrest, stroke or respiratory failure which can be fatal.  Other important risks are nonunion, injury to the nerve roots, blood loss requiring transfusion, spinal fluid leak which could require additional surgery, hardware failure, adjacent segment failure which would require additional surgery, incomplete symptom relief.    Patient would like to have surgery soon as possible.  They are within my power to see if we can schedule him in the next 4 to 6 weeks.    Time spent on  the telephone with the patient was 29 minutes.  An additional 17 minutes was spent on documentation care coordination to include case request.      Nacho Zavaleta MD  , Department of Orthopedic Surgery  [facility]

## 2023-10-18 NOTE — NURSING NOTE
Reason For Visit:   Chief Complaint   Patient presents with    RECHECK     phone visit [phone], Review CT and go over surgery questions       Primary MD: Luciano Hogan  Ref. MD: EST    ?  No  Occupation retired.     Date of injury: No  Type of injury: No     Date of surgery & Type:  1999 Lumbar herniated disc     2016 L2-3 BILATERAL LAMINECTOMY DISCECTOMY, L3-5 DECOMPRESSIVE LAMINECTOMY      10/27/2022 Redo left L2-3 decompression with L2-3 Transforaminal Lumbar Interbody Fusion L2-3 posterior lateral instrumentation and fusion      Smoker: No  Request smoking cessation information: No    There were no vitals taken for this visit.    Pain Assessment  Patient Currently in Pain: Yes  0-10 Pain Scale: 7  Primary Pain Location: Back    Oswestry (RUPERTO) Questionnaire        10/2/2023     4:39 PM   OSWESTRY DISABILITY INDEX   Count 10   Sum 39   Oswestry Score (%) 78 %        Visual Analog Pain Scale  Back Pain Scale 0-10: 7  Right leg pain: 0  Left leg pain: 0  Neck Pain Scale 0-10: 0  Right arm pain: 0  Left arm pain: 0    Promis 10 Assessment         No data to display                         Magdalena Siu LPN

## 2023-10-18 NOTE — LETTER
10/18/2023         RE: Emile Wood  [address]  [facility] [facility] [address]        Dear Colleague,    Thank you for referring your patient, Emile Wood, to the [facility] [place]. Please see a copy of my visit note below.    Chief concern: Follow-up advanced imaging, discussion of potential surgery going forward\    History of present illness:  Emile Wood is a 72 year old male with remote history of lumbar L2-S1 decompression with Dr Vergara, more recent revision decompression and TLIF at L2-3 with Pablo Gallego who presents today for evaluation of chronic pain of lower back as well as right lower extremity radicular pain in an L5 dermatome.      Following her previous appointment we obtained full-length spine radiographs on the EOS as well as CT of the lumbar spine.    He reports that his symptoms continue to progress.  He did have mechanical fall approximately 2 weeks ago and had increased pain since that time.  He is able to bear weight but is just very restricted by low back pain.  He denies any bowel or bladder symptoms    The patient and his wife were contacted by telephone today.  They had several well-informed questions regarding what potential surgery would look like.  We discussed both the indications for surgery for the patient which include adjacent segment degeneration, decompensated lumbar flatback deformity, multilevel degenerative disc disease lumbar stenosis, lumbar radiculopathy.  His symptoms of failed to respond to appropriate course of nonoperative measures.  Today his VAS pain score for back was 7/10.  His Oswestry disability index today is 78%.    Pelvic incidence measures 46 degrees, L1-S1 lordosis is -11 L4-S1 lordosis -10, T1 pelvic angle is 33 degrees and pelvic tilt is 23 degrees.  Sagittal vertical axis is 136 mm    On MRI he has moderate stenosis at L1-2 with loss of CSF signal within the thecal sac, large disc herniations at L3-4  L4-5 and L5-S1 with corresponding impingement of the neural elements    Impression and plan:  72-year-old male with prior L2-S1 decompressive laminectomy, subsequent revision decompression with TLIF at L2-3 with lumbar stenosis, decompensated lumbar flatback deformity, fixed sagittal imbalance, lumbar radiculopathy.  Recommended revision surgery to address his spinal deformity as well as decompress the neural elements.  I recommended instrumented fusion from T11 down to S1 to with bilateral pelvic fixation, potential Smith-Plummer osteotomy and transforaminal lumbar interbody fusion at L1-2, L3-4, L4-5, L5-S1 to recreate intervertebral disc space and harmonious lumbar lordosis.  Would use local autograft bone, crushed cancellous allograft from [employer] as well as bone morphogenic protein to promote osseous union to a long fusion construct given multiple prior surgeries in this space.  We discussed that postoperative hospitalization would likely be 5 to 7 days potentially need discharge to a transitional care unit.  I discussed that the risk of a preoperative complication is likely 50% with this surgery and this could include anything from minor complication such as urinary tract infection up to and including cardiac arrest, stroke or respiratory failure which can be fatal.  Other important risks are nonunion, injury to the nerve roots, blood loss requiring transfusion, spinal fluid leak which could require additional surgery, hardware failure, adjacent segment failure which would require additional surgery, incomplete symptom relief.    Patient would like to have surgery soon as possible.  They are within my power to see if we can schedule him in the next 4 to 6 weeks.    Time spent on the telephone with the patient was 29 minutes.  An additional 17 minutes was spent on documentation care coordination to include case request.      Nacho Zavaleta MD  , Department of Orthopedic  Surgery  [facility]

## 2023-10-19 NOTE — PROGRESS NOTES
Teaching Flowsheet   Relevant Diagnosis: spine  Teaching Topic: preop     Person(s) involved in teaching:   Patient     Motivation Level:  Asks Questions: Yes  Eager to Learn: Yes  Cooperative: Yes  Receptive (willing/able to accept information): Yes  Any cultural factors/Latter day beliefs that may influence understanding or compliance? No       Patient demonstrates understanding of the following:  Reason for the appointment, diagnosis and treatment plan: Yes  Knowledge of proper use of medications and conditions for which they are ordered (with special attention to potential side effects or drug interactions): Yes  Which situations necessitate calling provider and whom to contact: Yes       Teaching Concerns Addressed:        Proper use and care of meds (medical equip, care aids, etc.): Yes  Nutritional needs and diet plan: Yes  Pain management techniques: Yes  Wound Care: Yes  How and/when to access community resources: Yes     Instructional Materials Used/Given: postal mailed preop pkt.       Time spent with patient: 15 minutes.

## 2023-10-26 ENCOUNTER — TELEPHONE (OUTPATIENT)
Dept: ORTHOPEDICS | Facility: CLINIC | Age: 73
End: 2023-10-26
Payer: MEDICARE

## 2023-10-26 NOTE — TELEPHONE ENCOUNTER
[facility] Call Center    Phone Message    May a detailed message be left on voicemail: yes     Reason for Call: Other: Patient is calling to speak with Roxy about a revised cardiac pre op date. Patient declined to elaborate further. Please call patient back.       Action Taken: Other: [id number]    Travel Screening: Not Applicable

## 2023-10-26 NOTE — TELEPHONE ENCOUNTER
"RN called patient back to discuss. Patient wanting to get surgery scheduled then once we have a date he will \"cut back\" on his drinking and then 2 weeks prior to the surgery will stop completely. Patient again stating that every day that goes by it's harder and harder for him to walk. Patient under the impression that he will be having surgery in the next next several weeks. Will send message to spine team.    Roxy Mejia RN    "

## 2023-10-30 ENCOUNTER — TELEPHONE (OUTPATIENT)
Dept: ORTHOPEDICS | Facility: CLINIC | Age: 73
End: 2023-10-30
Payer: MEDICARE

## 2023-10-30 DIAGNOSIS — Z87.898 HISTORY OF ALCOHOL USE: Primary | ICD-10-CM

## 2023-10-30 DIAGNOSIS — G62.1 ALCOHOLIC POLYNEUROPATHY (H): ICD-10-CM

## 2023-10-30 NOTE — TELEPHONE ENCOUNTER
[facility] Call Center    Phone Message    May a detailed message be left on voicemail: yes     Reason for Call: Other: Patient would like a call back as soon as possible, no reason given.  Thank you.     Action Taken: Other: [id number]    Travel Screening: Not Applicable

## 2023-10-30 NOTE — TELEPHONE ENCOUNTER
Writer called and spoke with patient on the phone. Pt states that they are trying to return a phone message to [facility] ALONZO. Writer will notify yue Patrick return call.     Magdalena Siu LPN

## 2023-10-30 NOTE — TELEPHONE ENCOUNTER
Spoke to Emile. Educated that his surgery is scheduled for 12/26 but we could potentially get it done earlier 12/14 or 12/19.     Educated that he should wean off alcohol use this month. Educated we will plan on getting a alcohol drug screen 1 week after quit date.    ONEL FordC

## 2023-10-31 ENCOUNTER — TELEPHONE (OUTPATIENT)
Dept: ORTHOPEDICS | Facility: CLINIC | Age: 73
End: 2023-10-31
Payer: MEDICARE

## 2023-10-31 NOTE — TELEPHONE ENCOUNTER
[facility] Call Center    Phone Message    May a detailed message be left on voicemail: yes     Reason for Call: Other: Patient needs to speak to  or Roxy regarding the revised cardiac pre-op.      Action Taken: Message routed to:  [facility] ortho    Travel Screening: Not Applicable

## 2023-10-31 NOTE — TELEPHONE ENCOUNTER
Patient moved up appointment with Cardiology to November 15.    Patient really wanting surgery sooner, expressed that he will stop quitting two weeks prior to surgery once he has a date. RN let him know that surgery will most likely be 12/26 and he can start weaning anytime but should plan on quitting in the coming weeks to get the drug screen.    Roxy Mejia RN

## 2023-11-17 NOTE — TELEPHONE ENCOUNTER
Phoned patient to get him schedule for PAC+post ops with Dr. Zavaleta, for his attentive surgery date.     Will try again.

## 2023-11-17 NOTE — TELEPHONE ENCOUNTER
Rescheduled  Patient is scheduled for surgery with Dr. Zavaleta    Spoke with: Emile    Date of Surgery: 12/26/23    Location: [facility] OR    Informed patient they will need an adult  Yes    Pre op with Provider PAC, 12/1/23 at 10AM    Additional imaging/appointments: POP Made    Surgery packet: Received     Additional comments: N/A        Georgina Sheikh on 11/17/2023 at 2:19 PM

## 2023-11-20 NOTE — TELEPHONE ENCOUNTER
FUTURE VISIT INFORMATION      SURGERY INFORMATION:  Date: 23  [facility] or  Surgeon:  Nacho Zavaleta MD   Anesthesia Type:  general  Procedure: stealth/O arm assisted reinstrumentation Lumbar 2-3, posterior spinal fusion Thoracic 11-Sacral 1, bilateral pelvic fixation ([place]), SPO and Transforaminal lumbar interbody fusion (TLIF) Lumbar 1-2, Lumbar 3-4, Lumbar 4-5, Lumbar 5-Sacral 1, local autograft, allograft bone and BMP     RECORDS REQUESTED FROM:       Primary Care Provider: Luciano Hogan MD  - [facility]    Pertinent Medical History: CHF, Atrial fibrillation    Most recent EKG+ Tracin/15/23[facility]

## 2023-11-30 LAB
ABO/RH(D): NORMAL
ANTIBODY SCREEN: NEGATIVE
SPECIMEN EXPIRATION DATE: NORMAL

## 2023-12-01 ENCOUNTER — PRE VISIT (OUTPATIENT)
Dept: SURGERY | Facility: CLINIC | Age: 73
End: 2023-12-01

## 2023-12-01 ENCOUNTER — ANESTHESIA EVENT (OUTPATIENT)
Dept: SURGERY | Facility: CLINIC | Age: 73
DRG: 456 | End: 2023-12-01
Payer: MEDICARE

## 2023-12-01 ENCOUNTER — OFFICE VISIT (OUTPATIENT)
Dept: SURGERY | Facility: CLINIC | Age: 73
End: 2023-12-01
Payer: MEDICARE

## 2023-12-01 ENCOUNTER — LAB (OUTPATIENT)
Dept: LAB | Facility: CLINIC | Age: 73
End: 2023-12-01
Payer: MEDICARE

## 2023-12-01 VITALS
TEMPERATURE: 97.7 F | BODY MASS INDEX: 38.32 KG/M2 | OXYGEN SATURATION: 94 % | SYSTOLIC BLOOD PRESSURE: 127 MMHG | DIASTOLIC BLOOD PRESSURE: 75 MMHG | WEIGHT: 230 LBS | HEIGHT: 65 IN | HEART RATE: 66 BPM

## 2023-12-01 DIAGNOSIS — Z98.1 S/P LUMBAR FUSION: ICD-10-CM

## 2023-12-01 DIAGNOSIS — M40.37 FLATBACK SYNDROME OF LUMBOSACRAL REGION: ICD-10-CM

## 2023-12-01 DIAGNOSIS — Z01.818 PREOP EXAMINATION: Primary | ICD-10-CM

## 2023-12-01 DIAGNOSIS — M43.16 LUMBAR ADJACENT SEGMENT DISEASE WITH SPONDYLOLISTHESIS: ICD-10-CM

## 2023-12-01 DIAGNOSIS — Z01.818 PREOP EXAMINATION: ICD-10-CM

## 2023-12-01 DIAGNOSIS — M51.369 LUMBAR ADJACENT SEGMENT DISEASE WITH SPONDYLOLISTHESIS: ICD-10-CM

## 2023-12-01 DIAGNOSIS — F10.20 UNCOMPLICATED ALCOHOL DEPENDENCE (H): ICD-10-CM

## 2023-12-01 LAB
ALBUMIN SERPL BCG-MCNC: 4.1 G/DL (ref 3.5–5.2)
ALP SERPL-CCNC: 84 U/L (ref 40–150)
ALT SERPL W P-5'-P-CCNC: 22 U/L (ref 0–70)
ANION GAP SERPL CALCULATED.3IONS-SCNC: 9 MMOL/L (ref 7–15)
AST SERPL W P-5'-P-CCNC: 31 U/L (ref 0–45)
BILIRUB SERPL-MCNC: 0.8 MG/DL
BUN SERPL-MCNC: 16.4 MG/DL (ref 8–23)
CALCIUM SERPL-MCNC: 9.3 MG/DL (ref 8.8–10.2)
CHLORIDE SERPL-SCNC: 103 MMOL/L (ref 98–107)
CREAT SERPL-MCNC: 0.64 MG/DL (ref 0.67–1.17)
DEPRECATED HCO3 PLAS-SCNC: 27 MMOL/L (ref 22–29)
EGFRCR SERPLBLD CKD-EPI 2021: >90 ML/MIN/1.73M2
GLUCOSE SERPL-MCNC: 138 MG/DL (ref 70–99)
INR PPP: 1.06 (ref 0.85–1.15)
POTASSIUM SERPL-SCNC: 4.1 MMOL/L (ref 3.4–5.3)
PROT SERPL-MCNC: 7 G/DL (ref 6.4–8.3)
SODIUM SERPL-SCNC: 139 MMOL/L (ref 135–145)

## 2023-12-01 PROCEDURE — 36415 COLL VENOUS BLD VENIPUNCTURE: CPT | Performed by: PATHOLOGY

## 2023-12-01 PROCEDURE — 80053 COMPREHEN METABOLIC PANEL: CPT | Performed by: PATHOLOGY

## 2023-12-01 PROCEDURE — 99215 OFFICE O/P EST HI 40 MIN: CPT | Performed by: NURSE PRACTITIONER

## 2023-12-01 PROCEDURE — 86901 BLOOD TYPING SEROLOGIC RH(D): CPT | Performed by: NURSE PRACTITIONER

## 2023-12-01 PROCEDURE — 85610 PROTHROMBIN TIME: CPT | Performed by: PATHOLOGY

## 2023-12-01 PROCEDURE — 86850 RBC ANTIBODY SCREEN: CPT | Performed by: NURSE PRACTITIONER

## 2023-12-01 RX ORDER — AMITRIPTYLINE HYDROCHLORIDE 10 MG/1
10 TABLET ORAL AT BEDTIME
COMMUNITY

## 2023-12-01 ASSESSMENT — LIFESTYLE VARIABLES: TOBACCO_USE: 1

## 2023-12-01 ASSESSMENT — ENCOUNTER SYMPTOMS
SEIZURES: 0
DYSRHYTHMIAS: 1

## 2023-12-01 ASSESSMENT — PAIN SCALES - GENERAL: PAINLEVEL: EXTREME PAIN (9)

## 2023-12-01 NOTE — PATIENT INSTRUCTIONS
Preparing for Your Surgery      Name:  Emile Wood   MRN:  [id number]   :  1950   Today's Date:  2023       Arriving for surgery:  Surgery date:  23  Arrival time:  11:10AM    Please come to:     Please come to:      [facility] [address]  [address] [place]  [address]      The [facility] Patient /Visitor Ramp is located at [address]. Patients and visitors who self-park will receive the reduced hospital parking rate. If the Patient /Visitor Ramp is full, please follow the signs to the  parking located at the main hospital entrance.     parking is available ( 24 hours/ 7 days a week)    Discounted parking pass options are available for patients and visitors. They can be purchased at the [employer] desk at the main hospital entrance.    -    Stop at the security desk and they will direct surgery patients to the 3rd floor Surgery Waiting Room. [phone]     -  If you are in need of directions, wheelchair or escort please stop at the Information/security desk in the lobby.       What can I eat or drink?  -  You may eat and drink normally up to 8 hours prior to arrival time. (Until 23, 3:10AM)  -  You may have clear liquids until 2 hours prior to arrival time. (Until 23, 9:10AM)    Examples of clear liquids:  Water  Clear broth  Juices (apple, white grape, white cranberry  and cider) without pulp  Noncarbonated, powder based beverages  (lemonade and Wesley-Aid)  Sodas (Sprite, 7-Up, ginger ale and seltzer)  Coffee or tea (without milk or cream)  Gatorade    -  No Alcohol or cannabis products for at least 24 hours before surgery.     Which medicines can I take?    Hold Clopidogrel(Plavix) for 5 days prior to surgery, last dose 23.  Hold Multivitamins for 7 days before surgery.  Hold Supplements, Vitamin B complex and Vitamin B12 for 7 days before surgery.  Hold Ibuprofen  (Advil, Motrin) for 1 day(s) before surgery--unless otherwise directed by surgeon.  Hold Sildenafil(Viagra) for 24 hours prior to surgery.   Hold Naproxen (Aleve) for 4 days before surgery.    -  DO NOT take these medications the day of surgery:    Calcium plus Vitamin D    Furosemide(Lasix)        -  PLEASE TAKE these medications the day of surgery:    Acetaminophen(Tylenol) as needed    Allopurinol(Zyloprim)    Amiodarone(Pacerone)    Amlodipine(Norvasc)    Aspirin    Levothyroxine    Pregabalin(Lyrica)    Oxycodone(Percocet) as needed    How do I prepare myself?  - Please take 2 showers (one the night prior to surgery and one the morning of surgery) using Scrubcare or Hibiclens soap.    Use this soap only from the neck to your toes.     Leave the soap on your skin for one minute--then rinse thoroughly.      You may use your own shampoo and conditioner. No other hair products.   - Please remove all jewelry and body piercings.  - No lotions, deodorants or fragrance.  - Bring your ID and insurance card.    -If you use a CPAP machine, please bring the CPAP machine, tubing, and mask to hospital.    -If you have a Deep Brain Stimulator, Spinal Cord Stimulator, or any Neuro Stimulator device---you must bring the remote control to the hospital.        Covid testing policy as of 12/06/2022  Your surgeon will notify and schedule you for a COVID test if one is needed before surgery--please direct any questions or COVID symptoms to your surgeon      Questions or Concerns:    - For any questions regarding the day of surgery or your hospital stay, please contact the Pre Admission Nursing Office at [phone].       - If you have health changes between today and your surgery, please call your surgeon.       - For questions after surgery, please call your surgeons office.           Current Visitor Guidelines    You may have 2 visitors in the pre op area.    Visiting hours: 8 a.m. to 8:30 p.m.    You may have four visitors  during your inpatient hospital stay.    Patients confirmed or suspected to have symptoms of COVID 19 or flu:     No visitors allowed for adult patients.   Children (under age 18) can have 1 named visitor.     People who are sick or showing symptoms of COVID 19 or flu:    Are not allowed to visit patients--we can only make exceptions in special situations.       Please follow these guidelines for your visit:          Please maintain social distance          Masking is optional--however at times you may be asked to wear a mask for the safety of yourself and others     Clean your hands with alcohol hand . Do this when you arrive at and leave the building and patient room,    And again after you touch your mask or anything in the room.     Go directly to and from the room you are visiting.     Stay in the patient s room during your visit. Limit going to other places in the hospital as much as possible     Leave bags and jackets at home or in the car.     For everyone s health, please don t come and go during your visit. That includes for smoking   during your visit.

## 2023-12-01 NOTE — H&P
Pre-Operative H & P     CC:  Preoperative exam to assess for increased cardiopulmonary risk while undergoing surgery and anesthesia.    Date of Encounter: 12/1/2023  Primary Care Physician:  Luciano Hogan     Reason for visit:   Encounter Diagnoses   Name Primary?    Preop examination Yes    S/P lumbar fusion     Flatback syndrome of lumbosacral region     Lumbar adjacent segment disease with spondylolisthesis        HPI  Emile Wood is a 73 year old male who presents for pre-operative H & P in preparation for  Procedure Information       Case: [id number] Date/Time: 12/26/23 1310    Procedure: stealth/O arm assisted reinstrumentation Lumbar 2-3, posterior spinal fusion Thoracic 11-Sacral 1, bilateral pelvic fixation ([place]), SPO and Transforaminal lumbar interbody fusion (TLIF) Lumbar 1-2, Lumbar 3-4, Lumbar 4-5, Lumbar 5-Sacral 1, local autograft, allograft bone and BMP (Spine)    Anesthesia type: General    Diagnosis:       S/P lumbar fusion [Z98.1]      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]      Lumbar adjacent segment disease with spondylolisthesis [M51.36, M43.16]      Chronic diastolic CHF (congestive heart failure) (H) [I50.32]      Atrial fibrillation, unspecified type (H) [I48.91]    Pre-op diagnosis:       S/P lumbar fusion [Z98.1]      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]      Lumbar adjacent segment disease with spondylolisthesis [M51.36, M43.16]      Chronic diastolic CHF (congestive heart failure) (H) [I50.32]      Atrial fibrillation, unspecified type (H) [I48.91]    Location:  OR 03 /  OR    Providers: Nacho Zavaleta MD            Emile Wood is a 73 year old male with CAD, pacemaker status, a-fib s/p watchman, old MI, CAD, chronic diastolic heart failure, aortic stenosis s/p TAVR, hypertension, hyperlipidemia, history of smoking, gout, alcoholic neuropathy, obesity, prediabetes, cocaine abuse in remission and active alcohol dependence that has  chronic low back pain, flatback syndrome and lumbar adjacent segment disease with spondylolisthesis that has had 4 prior spinal surgeries.  He notes that he has had right lower back pain for at least 25 years now.  Exacerbating factors for pain include walking, prolonged sitting, and twisting. Alleviating factors include heat, deep tissue massage, and leaning forward. Dry needle has been tried at PT with relief that is short term only. He has had previous injections without lasting improvement. He routinely takes oxycodone 2x daily and has done so for multiple years.  Patient is severely disabled due to his symptoms and desired surgery so he consulted with Dr. Zavaleta and has been scheduled for the above surgery.     History is obtained from the patientand chart review    Hx of abnormal bleeding or anti-platelet use: aspirin and plavix      Past Medical History  Past Medical History:   Diagnosis Date    Alcoholic polyneuropathy (H24)     Arthritis     knee and back    CAD (coronary artery disease)     Carotid artery stenosis     Chronic diastolic CHF (congestive heart failure) (H)     Chronic sinusitis     Flatback syndrome of lumbosacral region     Gout, chronic     Heart murmur     Hypertension     Irregular heart beat     hx at fib with 3 cardioversions    Numbness and tingling     peripheral neuropathy    Pacemaker     Peripheral neuropathy     Stented coronary artery     stent and CABG    Thyroid disease     Unspecified atrial fibrillation (H)        Past Surgical History  Past Surgical History:   Procedure Laterality Date    AORTIC VALVE REPLACEMENT      2021    ARTHROPLASTY KNEE  04/29/2013    Procedure: ARTHROPLASTY KNEE;  LEFT TOTAL KNEE ARTHROPLASTY ;  Surgeon: Luciano Mcgraw MD;  Location:  OR    cabg      CARDIAC SURGERY  2002    CABG    COLONOSCOPY N/A 05/02/2018    Procedure: COLONOSCOPY;;  Surgeon: John Vilchis MD;  Location:  OR    ESOPHAGOSCOPY, GASTROSCOPY, DUODENOSCOPY (EGD), COMBINED  N/A 05/02/2018    Procedure: COMBINED ESOPHAGOSCOPY, GASTROSCOPY, DUODENOSCOPY (EGD);  ESOPHAGOSCOPY, GASTROSCOPY, DUODENOSCOPY WITH BIOPSIES AND POLYPECTOMY, COLONOSCOPY WITH POLYPECTOMIES;  Surgeon: John Vilchis MD;  Location:  OR    OPTICAL TRACKING SYSTEM FUSION POSTERIOR SPINE LUMBAR N/A 10/27/2022    Procedure: Redo left L2-3 decompression with L2-3 Transforaminal Lumbar Interbody Fusion L2-3 posterior lateral instrumentation and fusion;  Surgeon: Aung Gallego MD;  Location:  OR    ORTHOPEDIC SURGERY      hallie knee, L4, c5, shoulder       Prior to Admission Medications  Current Outpatient Medications   Medication Sig Dispense Refill    acetaminophen (TYLENOL) 325 MG tablet Take 650 mg by mouth as needed      allopurinol (ZYLOPRIM) 300 MG tablet Take 300 mg by mouth every morning      amiodarone (PACERONE) 200 MG tablet Take 200 mg by mouth every morning      amitriptyline (ELAVIL) 10 MG tablet Take 10 mg by mouth at bedtime      amLODIPine (NORVASC) 10 MG tablet Take 1 tablet by mouth every morning      aspirin (ASA) 81 MG chewable tablet Take 81 mg by mouth every morning      atorvastatin (LIPITOR) 80 MG tablet Take 80 mg by mouth At Bedtime      B Complex Vitamins (VITAMIN B COMPLEX PO) Take 1 tablet by mouth every morning      Calcium Carb-Cholecalciferol 600-800 MG-UNIT TABS Take 1 tablet by mouth daily (with lunch)      clopidogrel (PLAVIX) 75 MG tablet Take 75 mg by mouth every morning      furosemide (LASIX) 20 MG tablet Take 60 mg by mouth every morning      levothyroxine (SYNTHROID/LEVOTHROID) 175 MCG tablet Take 175 mcg by mouth every morning      nitroglycerin (NITROSTAT) 0.4 MG SL tablet Place 1 tablet under the tongue every 5 minutes as needed for chest pain (CALL 911 IF NOT IMPROVED AFTER THREE CONSECUTIVE DOSES). 25 tablet 0    oxyCODONE-acetaminophen (PERCOCET)  MG per tablet Take 1 tablet by mouth every 6 hours as needed      Pregabalin (LYRICA) 200 MG capsule Take  200-400 mg by mouth 2 times daily Take 2 capsules in the morning and  one capsule in the evening      sildenafil (VIAGRA) 100 MG tablet Take 100 mg by mouth daily as needed (sexual activity)      vitamin B-12 (CYANOCOBALAMIN) 1000 MCG tablet Take 1,000 mcg by mouth every morning      amoxicillin (AMOXIL) 500 MG capsule Take 2,000 mg by mouth as needed Dental work         Allergies  No Known Allergies    Social History  Social History     Socioeconomic History    Marital status:      Spouse name: Not on file    Number of children: 2    Years of education: Not on file    Highest education level: Not on file   Occupational History    Not on file   Tobacco Use    Smoking status: Former     Types: Cigarettes     Quit date:      Years since quittin.9     Passive exposure: Past    Smokeless tobacco: Former    Tobacco comments:     smoke cigars   Vaping Use    Vaping Use: Never used   Substance and Sexual Activity    Alcohol use: Yes     Alcohol/week: 35.0 - 42.0 standard drinks of alcohol     Types: 35 - 42 Standard drinks or equivalent per week     Comment: daily- 4 beers, sometimes glass of wine, shot of whiskey    Drug use: No    Sexual activity: Not on file   Other Topics Concern    Not on file   Social History Narrative    Not on file     Social Determinants of Health     Financial Resource Strain: Not on file   Food Insecurity: Not on file   Transportation Needs: Not on file   Physical Activity: Not on file   Stress: Not on file   Social Connections: Not on file   Interpersonal Safety: Not on file   Housing Stability: Not on file       Family History  Family History   Problem Relation Age of Onset    Cerebrovascular Disease Mother     Cerebrovascular Disease Father     Lung Cancer Father     Heart Disease Brother     Anesthesia Reaction No family hx of     Thrombosis No family hx of        Review of Systems  The complete review of systems is negative other than noted in the HPI or here.   Anesthesia  Evaluation   Pt has had prior anesthetic. Type: General and MAC.    No history of anesthetic complications       ROS/MED HX  ENT/Pulmonary:     (+)     SUHAS risk factors,  hypertension, obese,        tobacco use, Past use,                   (-) recent URI   Neurologic: Comment: Lumbar adjacent segment disease with spondylolisthesis   Flatback syndrome    History cervical spine surgery    (+)    peripheral neuropathy, - alcoholic neuropathy.                        (-) no seizures and no CVA   Cardiovascular: Comment: Mild 4.2 cm fusiform dilation of the ascending thoracic aorta, similar to prior.     A fib s/p cardioversion X3  Watchman device        (+) Dyslipidemia hypertension-range: controlled/ Peripheral Vascular Disease-- Other and Carotid Stenosis.  CAD - past MI CABG-date: 2002. stent-after CAB. 3  Taking blood thinners Pt has not received instructions: Instructions Given to patient: ASA 81 mg and Plavix daily. CHF etiology: chronic diastolic Last EF: 60% date: 7/13/23  PETTIT.   pacemaker, Reason placed: SA node dysfunction. type: [employer], settings: AAIR-DDDR , - Patientt is not dependent on pacemaker.      dysrhythmias, a-fib,  valvular problems/murmurs type: AS s/p TAVR 2021.    Previous cardiac testing   Echo: Date: 7/13/2023 Results:  4/21/23 Echocardiogram  CONCLUSIONS   h/o TAVR with normal valve function on today's study.   Left ventricular ejection fraction is visually estimated at 60%.   Mild dilation of the aorta is present involving the ascending aorta.   (Maximal dimension 4.4 cm).     No significant change, comparing to echo of Apr 2022.         7/13/23 Limited echocardiogram   CONCLUSIONS   Limited echo. S/P Watchman.     1. Normal left ventricular size and global and regional function.   Ejection fraction is visually estimated at 60%.   2. Normal right ventricle size and normal global function.   3. Trivial pericardial effusion.     Compared with the previous study dated 4/21/23, trivial  "pericardial   effusion.     Stress Test:  Date: Results:    ECG Reviewed:  Date: Results:    Cath:  Date: 2021 Results:  Conclusions    1. Severe native coronary artery atherosclerosis.     2. Coronary artery calcification.     3. Patent stents in the first obtuse marginal branch.     4. Patent bypass grafts, including LIMA to left anterior descending   artery   and free radial artery graft to right posterior descending artery.     5. No significant change compared to completion angiography from   3-3-2018.         METS/Exercise Tolerance: 1 - Eating, dressing Comment: Able to walk one block with walking stick or walker   Hematologic:    (-) history of blood clots and history of blood transfusion   Musculoskeletal: Comment: S/p knee replacement  (+)  arthritis,             GI/Hepatic:  - neg GI/hepatic ROS     Renal/Genitourinary: Comment: Nocturia  Erectile dysfunction      Endo: Comment: prediabetes    (+)          thyroid problem, hypothyroidism,           Psychiatric/Substance Use: Comment: 5-6 drinks daily, reports he has weaned down to 4 beers per day now.    Cocaine abuse in remission since treatment in 1987    (+) psychiatric history depression alcohol abuse H/O chronic opiod use .     Infectious Disease:  - neg infectious disease ROS     Malignancy:  - neg malignancy ROS     Other:  - neg other ROS    (+)  , H/O Chronic Pain,         /75 (BP Location: Right arm, Patient Position: Chair, Cuff Size: Adult Large)   Pulse 66   Temp 97.7  F (36.5  C) (Oral)   Ht 1.651 m (5' 5\")   Wt 104.3 kg (230 lb)   SpO2 94%   BMI 38.27 kg/m      Physical Exam - exam completed with patient sitting in chair due to inability to get on raised exam table.   Constitutional: Awake, alert, cooperative, no apparent distress, and appears stated age. obese  Eyes: Pupils equal, round and reactive to light, extra ocular muscles intact, sclera clear, conjunctiva normal.  HENT: Normocephalic, oral pharynx with moist mucus " membranes, good dentition. No goiter appreciated.   Respiratory: Clear to auscultation bilaterally, no crackles or wheezing.  Cardiovascular: Regular rate and rhythm, normal S1 and S2, and systolic murmur noted.  Carotids +2, no bruits. 1+ BLE edema. Palpable pulses to right radial,  DP and PT arteries.  Left radial artery pulse diminished.    GI: deferred  Lymph/Hematologic: No cervical lymphadenopathy and no supraclavicular lymphadenopathy.  Skin: Warm and dry.    Musculoskeletal: Full ROM of neck. There is no redness, warmth, or swelling of the joints. Gross motor strength is decreased.   Neurologic: Awake, alert, oriented to name, place and time. Cranial nerves II-XII are grossly intact. Gait is impaired.   Neuropsychiatric: Calm, cooperative. Normal affect.     Prior Labs/Diagnostic Studies   All labs and imaging personally reviewed     EKG/ stress test - if available please see in ROS above     Hemoglobin  Order: [id number]   suggestion  Information displayed in this report may not trend or trigger automated decision support.     Component  Ref Range & Units 1 mo ago   HEMOGLOBIN                13.5 - 17.5 g/dL 14.3   MCV                        80 - 100 fL 93     Specimen Collected: 10/03/23  8:51 AM    Performed by: [facility] Last Resulted: 10/03/23  9:00 AM   Received From: [facility]  Result Received: 10/03/23  3:26 PM    View Encounter        Received Information  CBC WITH AUTO DIFFERENTIAL  Order: [id number]   suggestion  Information displayed in this report may not trend or trigger automated decision support.     Component  Ref Range & Units 4 mo ago   WHITE BLOOD COUNT          4.5 - 11.0 thou/cu mm 5.6   RED BLOOD COUNT            4.30 - 5.90 mil/cu mm 4.75   HEMOGLOBIN                13.5 - 17.5 g/dL 14.2   HEMATOCRIT                37.0 - 53.0 % 43.6   MCV                        80 - 100 fL 92   MCH                        26.0 - 34.0 pg 29.9   MCHC                       32.0 - 36.0 g/dL 32.6   RDW                        11.5 - 15.5 % 13.7   PLATELET COUNT            140 - 440 thou/cu mm 271   MPV                        6.5 - 11.0 fL 9.1   % NEUT  % 56.2   % LYMPH  % 24.0   % MONO  % 12.6   % EOS  % 5.2   % BASO  % 2.0   ABSOLUTE NEUTROPHILS      1.7 - 7.0 thou/cu mm 3.2   ABSOLUTE LYMPHOCYTES      0.9 - 2.9 thou/cu mm 1.4   ABSOLUTE MONOCYTES        <0.9 thou/cu mm 0.7   ABSOLUTE EOSINOPHILS      <0.5 thou/cu mm 0.3   ABSOLUTE BASOPHILS        <0.3 thou/cu mm 0.1     Specimen Collected: 07/28/23 10:15 AM    Performed by: [facility] Last Resulted: 07/28/23 10:26 AM       Device Check  11/14/23  Narrative  Performed by [facility]  Normal dual chamber pacemaker function. This is a [employer] transmission. The device was interrogated to assess data and settings. No atrial or ventricular high rate episodes noted. RV paced 0.7%. Battery voltage WNL. [employer] transmission in 3 months. Please contact [phone] if any questions. mm    The patient's records and results personally reviewed by this provider.     Outside records reviewed from: Care Everywhere    LAB/DIAGNOSTIC STUDIES TODAY:    Component      Latest Ref Rng 12/1/2023  11:25 AM   Sodium      135 - 145 mmol/L 139    Potassium      3.4 - 5.3 mmol/L 4.1    Carbon Dioxide (CO2)      22 - 29 mmol/L 27    Anion Gap      7 - 15 mmol/L 9    Urea Nitrogen      8.0 - 23.0 mg/dL 16.4    Creatinine      0.67 - 1.17 mg/dL 0.64 (L)    GFR Estimate      >60 mL/min/1.73m2 >90    Calcium      8.8 - 10.2 mg/dL 9.3    Chloride      98 - 107 mmol/L 103    Glucose      70 - 99 mg/dL 138 (H)    Alkaline Phosphatase      40 - 150 U/L 84    AST      0 - 45 U/L 31    ALT      0 - 70 U/L 22    Protein Total      6.4 - 8.3 g/dL 7.0    Albumin      3.5 - 5.2 g/dL 4.1    Bilirubin Total      <=1.2 mg/dL 0.8    INR      0.85 - 1.15  1.06       Legend:  (L) Low  (H) High      Assessment    Emlie Wood is a 73 year old male seen as  a PAC referral for risk assessment and optimization for anesthesia.    Plan/Recommendations  Pt will be optimized for the proposed procedure.  See below for details on the assessment, risk, and preoperative recommendations    NEUROLOGY  - No history of TIA, CVA or seizure  - Chronic Pain  On chronic opiates, morphine equivilant = 60 MME/Day   -Post Op delirium risk factors:  Age and High co-morbid index    ENT  - No current airway concerns.  Will need to be reassessed day of surgery.  Mallampati: II  TM: > 3    CARDIAC  - following with cardiology for CAD  (s/p prior stenting and CABG), a-fib s/p watchman, severe aortic stenosis s/p TAVR, chronic diastolic heart failure, old MI, pacemaker status, hypertension and hyperlipidemia.  He was seen by them last on 11/15/23 and given approval to proceed with surgery with instructions to remain on aspirin without interruption prior to surgery (due to recent watchman) and an okay to hold plavix 5 days.  Please see their note in the notes tab.  - will message Dr. Zavaleta to ensure he is aware of cardiology recommendation to stay on aspirin. **addendum (12/5/23) - message received back from ERICH Mejia RN and okay for patient to remain on the 81mg aspirin without interruption prior to surgery.**  - hold lasix DOS.  - prior cardiac testing as above.      - METS (Metabolic Equivalents)  Patient CANNOT perform 4 METS exercise without symptoms            Total Score: 1    Functional Capacity: Unable to complete 4 METS      RCRI-High risk: Class 4  >11% complication reate            Total Score: 3    RCRI: High Risk Surgery    RCRI: CAD    RCRI: CHF        PULMONARY    SUHAS Medium Risk            Total Score: 4    SUHAS: Hypertension    SUHAS: BMI over 35 kg/m2    SUHAS: Over 50 ys old    SUHAS: Male      - Denies asthma or inhaler use  - Tobacco History    History   Smoking Status    Former    Types: Cigarettes    Quit date: 2018   Smokeless Tobacco    Former       GI  - denies  "GERD  PONV Medium Risk  Total Score: 2           1 AN PONV: Patient is not a current smoker    1 AN PONV: Intended Post Op Opioids        /RENAL  - hold viagra 24 hours prior to surgery.     ENDOCRINE    - BMI: Estimated body mass index is 38.27 kg/m  as calculated from the following:    Height as of this encounter: 1.651 m (5' 5\").    Weight as of this encounter: 104.3 kg (230 lb).  Obesity (BMI >30)  - No history of Diabetes Mellitus, but is prediabetic.  Monitor closely during admission.     HEME  VTE Low Risk 0.5%            Total Score: 3    VTE: Greater than 59 yrs old    VTE: Male      - aspirin and plavix as above.      MSK  Patient is NOT Frail            Total Score: 1    Frailty: Slower walking speed      -prehab referral not indicated.     - chronic right lower back pain.  Surgery planned as above.      PSYCH  - cocaine abuse in remission since treatment in 1987  - alcohol dependence - reports he was drinking 5-6 drinks (beer, hard alcohol and wine) per day, but has recently weaned down to 4 drinks per day.  He notes that he intends to wean down to no alcohol around 2 weeks prior to surgery.  He denies every having any withdrawal symptoms when he has abstained before.  Discussed with patient the risk of post-op alcohol withdrawals and also encouraged him to consider alcohol dependence treatment if he doesn't feel safe weaning his alcohol as he plans.  Patient verbalized understanding.  Monitor closely post-op as he will be high risk for withdrawals.  Will message Dr. Zavaleta to notify.            Different anesthesia methods/types have been discussed with the patient, but they are aware that the final plan will be decided by the assigned anesthesia provider on the date of service.  Patient was discussed with Dr Ocasio    The patient is optimized for their procedure. AVS with information on surgery time/arrival time, meds and NPO status given by nursing staff. No further diagnostic testing " indicated.      On the day of service:     Prep time: 20 minutes  Visit time: 19 minutes  Documentation/communication time: 25 minutes  ------------------------------------------  Total time: 64 minutes      KARLA Abreu CNP  Preoperative Assessment Center  [facility]  [facility] Center  Phone: [phone]  Fax: [phone]

## 2023-12-01 NOTE — H&P (VIEW-ONLY)
Pre-Operative H & P     CC:  Preoperative exam to assess for increased cardiopulmonary risk while undergoing surgery and anesthesia.    Date of Encounter: 12/1/2023  Primary Care Physician:  Luciano Hogan     Reason for visit:   Encounter Diagnoses   Name Primary?    Preop examination Yes    S/P lumbar fusion     Flatback syndrome of lumbosacral region     Lumbar adjacent segment disease with spondylolisthesis        HPI  Emile Wood is a 73 year old male who presents for pre-operative H & P in preparation for  Procedure Information       Case: [id number] Date/Time: 12/26/23 1310    Procedure: stealth/O arm assisted reinstrumentation Lumbar 2-3, posterior spinal fusion Thoracic 11-Sacral 1, bilateral pelvic fixation ([place]), SPO and Transforaminal lumbar interbody fusion (TLIF) Lumbar 1-2, Lumbar 3-4, Lumbar 4-5, Lumbar 5-Sacral 1, local autograft, allograft bone and BMP (Spine)    Anesthesia type: General    Diagnosis:       S/P lumbar fusion [Z98.1]      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]      Lumbar adjacent segment disease with spondylolisthesis [M51.36, M43.16]      Chronic diastolic CHF (congestive heart failure) (H) [I50.32]      Atrial fibrillation, unspecified type (H) [I48.91]    Pre-op diagnosis:       S/P lumbar fusion [Z98.1]      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]      Lumbar adjacent segment disease with spondylolisthesis [M51.36, M43.16]      Chronic diastolic CHF (congestive heart failure) (H) [I50.32]      Atrial fibrillation, unspecified type (H) [I48.91]    Location:  OR 03 /  OR    Providers: Nacho Zavaleta MD            Emile Wood is a 73 year old male with CAD, pacemaker status, a-fib s/p watchman, old MI, CAD, chronic diastolic heart failure, aortic stenosis s/p TAVR, hypertension, hyperlipidemia, history of smoking, gout, alcoholic neuropathy, obesity, prediabetes, cocaine abuse in remission and active alcohol dependence that has  chronic low back pain, flatback syndrome and lumbar adjacent segment disease with spondylolisthesis that has had 4 prior spinal surgeries.  He notes that he has had right lower back pain for at least 25 years now.  Exacerbating factors for pain include walking, prolonged sitting, and twisting. Alleviating factors include heat, deep tissue massage, and leaning forward. Dry needle has been tried at PT with relief that is short term only. He has had previous injections without lasting improvement. He routinely takes oxycodone 2x daily and has done so for multiple years.  Patient is severely disabled due to his symptoms and desired surgery so he consulted with Dr. Zavaleta and has been scheduled for the above surgery.     History is obtained from the patientand chart review    Hx of abnormal bleeding or anti-platelet use: aspirin and plavix      Past Medical History  Past Medical History:   Diagnosis Date    Alcoholic polyneuropathy (H24)     Arthritis     knee and back    CAD (coronary artery disease)     Carotid artery stenosis     Chronic diastolic CHF (congestive heart failure) (H)     Chronic sinusitis     Flatback syndrome of lumbosacral region     Gout, chronic     Heart murmur     Hypertension     Irregular heart beat     hx at fib with 3 cardioversions    Numbness and tingling     peripheral neuropathy    Pacemaker     Peripheral neuropathy     Stented coronary artery     stent and CABG    Thyroid disease     Unspecified atrial fibrillation (H)        Past Surgical History  Past Surgical History:   Procedure Laterality Date    AORTIC VALVE REPLACEMENT      2021    ARTHROPLASTY KNEE  04/29/2013    Procedure: ARTHROPLASTY KNEE;  LEFT TOTAL KNEE ARTHROPLASTY ;  Surgeon: Luciano Mcgraw MD;  Location:  OR    cabg      CARDIAC SURGERY  2002    CABG    COLONOSCOPY N/A 05/02/2018    Procedure: COLONOSCOPY;;  Surgeon: John Vilchis MD;  Location:  OR    ESOPHAGOSCOPY, GASTROSCOPY, DUODENOSCOPY (EGD), COMBINED  N/A 05/02/2018    Procedure: COMBINED ESOPHAGOSCOPY, GASTROSCOPY, DUODENOSCOPY (EGD);  ESOPHAGOSCOPY, GASTROSCOPY, DUODENOSCOPY WITH BIOPSIES AND POLYPECTOMY, COLONOSCOPY WITH POLYPECTOMIES;  Surgeon: John Vilchis MD;  Location:  OR    OPTICAL TRACKING SYSTEM FUSION POSTERIOR SPINE LUMBAR N/A 10/27/2022    Procedure: Redo left L2-3 decompression with L2-3 Transforaminal Lumbar Interbody Fusion L2-3 posterior lateral instrumentation and fusion;  Surgeon: Aung Gallego MD;  Location:  OR    ORTHOPEDIC SURGERY      hallie knee, L4, c5, shoulder       Prior to Admission Medications  Current Outpatient Medications   Medication Sig Dispense Refill    acetaminophen (TYLENOL) 325 MG tablet Take 650 mg by mouth as needed      allopurinol (ZYLOPRIM) 300 MG tablet Take 300 mg by mouth every morning      amiodarone (PACERONE) 200 MG tablet Take 200 mg by mouth every morning      amitriptyline (ELAVIL) 10 MG tablet Take 10 mg by mouth at bedtime      amLODIPine (NORVASC) 10 MG tablet Take 1 tablet by mouth every morning      aspirin (ASA) 81 MG chewable tablet Take 81 mg by mouth every morning      atorvastatin (LIPITOR) 80 MG tablet Take 80 mg by mouth At Bedtime      B Complex Vitamins (VITAMIN B COMPLEX PO) Take 1 tablet by mouth every morning      Calcium Carb-Cholecalciferol 600-800 MG-UNIT TABS Take 1 tablet by mouth daily (with lunch)      clopidogrel (PLAVIX) 75 MG tablet Take 75 mg by mouth every morning      furosemide (LASIX) 20 MG tablet Take 60 mg by mouth every morning      levothyroxine (SYNTHROID/LEVOTHROID) 175 MCG tablet Take 175 mcg by mouth every morning      nitroglycerin (NITROSTAT) 0.4 MG SL tablet Place 1 tablet under the tongue every 5 minutes as needed for chest pain (CALL 911 IF NOT IMPROVED AFTER THREE CONSECUTIVE DOSES). 25 tablet 0    oxyCODONE-acetaminophen (PERCOCET)  MG per tablet Take 1 tablet by mouth every 6 hours as needed      Pregabalin (LYRICA) 200 MG capsule Take  200-400 mg by mouth 2 times daily Take 2 capsules in the morning and  one capsule in the evening      sildenafil (VIAGRA) 100 MG tablet Take 100 mg by mouth daily as needed (sexual activity)      vitamin B-12 (CYANOCOBALAMIN) 1000 MCG tablet Take 1,000 mcg by mouth every morning      amoxicillin (AMOXIL) 500 MG capsule Take 2,000 mg by mouth as needed Dental work         Allergies  No Known Allergies    Social History  Social History     Socioeconomic History    Marital status:      Spouse name: Not on file    Number of children: 2    Years of education: Not on file    Highest education level: Not on file   Occupational History    Not on file   Tobacco Use    Smoking status: Former     Types: Cigarettes     Quit date:      Years since quittin.9     Passive exposure: Past    Smokeless tobacco: Former    Tobacco comments:     smoke cigars   Vaping Use    Vaping Use: Never used   Substance and Sexual Activity    Alcohol use: Yes     Alcohol/week: 35.0 - 42.0 standard drinks of alcohol     Types: 35 - 42 Standard drinks or equivalent per week     Comment: daily- 4 beers, sometimes glass of wine, shot of whiskey    Drug use: No    Sexual activity: Not on file   Other Topics Concern    Not on file   Social History Narrative    Not on file     Social Determinants of Health     Financial Resource Strain: Not on file   Food Insecurity: Not on file   Transportation Needs: Not on file   Physical Activity: Not on file   Stress: Not on file   Social Connections: Not on file   Interpersonal Safety: Not on file   Housing Stability: Not on file       Family History  Family History   Problem Relation Age of Onset    Cerebrovascular Disease Mother     Cerebrovascular Disease Father     Lung Cancer Father     Heart Disease Brother     Anesthesia Reaction No family hx of     Thrombosis No family hx of        Review of Systems  The complete review of systems is negative other than noted in the HPI or here.   Anesthesia  Evaluation   Pt has had prior anesthetic. Type: General and MAC.    No history of anesthetic complications       ROS/MED HX  ENT/Pulmonary:     (+)     SUHAS risk factors,  hypertension, obese,        tobacco use, Past use,                   (-) recent URI   Neurologic: Comment: Lumbar adjacent segment disease with spondylolisthesis   Flatback syndrome    History cervical spine surgery    (+)    peripheral neuropathy, - alcoholic neuropathy.                        (-) no seizures and no CVA   Cardiovascular: Comment: Mild 4.2 cm fusiform dilation of the ascending thoracic aorta, similar to prior.     A fib s/p cardioversion X3  Watchman device        (+) Dyslipidemia hypertension-range: controlled/ Peripheral Vascular Disease-- Other and Carotid Stenosis.  CAD - past MI CABG-date: 2002. stent-after CAB. 3  Taking blood thinners Pt has not received instructions: Instructions Given to patient: ASA 81 mg and Plavix daily. CHF etiology: chronic diastolic Last EF: 60% date: 7/13/23  PETTIT.   pacemaker, Reason placed: SA node dysfunction. type: [employer]tronic, settings: AAIR-DDDR , - Patientt is not dependent on pacemaker.      dysrhythmias, a-fib,  valvular problems/murmurs type: AS s/p TAVR 2021.    Previous cardiac testing   Echo: Date: 7/13/2023 Results:  4/21/23 Echocardiogram  CONCLUSIONS   h/o TAVR with normal valve function on today's study.   Left ventricular ejection fraction is visually estimated at 60%.   Mild dilation of the aorta is present involving the ascending aorta.   (Maximal dimension 4.4 cm).     No significant change, comparing to echo of Apr 2022.         7/13/23 Limited echocardiogram   CONCLUSIONS   Limited echo. S/P Watchman.     1. Normal left ventricular size and global and regional function.   Ejection fraction is visually estimated at 60%.   2. Normal right ventricle size and normal global function.   3. Trivial pericardial effusion.     Compared with the previous study dated 4/21/23, trivial  "pericardial   effusion.     Stress Test:  Date: Results:    ECG Reviewed:  Date: Results:    Cath:  Date: 2021 Results:  Conclusions    1. Severe native coronary artery atherosclerosis.     2. Coronary artery calcification.     3. Patent stents in the first obtuse marginal branch.     4. Patent bypass grafts, including LIMA to left anterior descending   artery   and free radial artery graft to right posterior descending artery.     5. No significant change compared to completion angiography from   3-3-2018.         METS/Exercise Tolerance: 1 - Eating, dressing Comment: Able to walk one block with walking stick or walker   Hematologic:    (-) history of blood clots and history of blood transfusion   Musculoskeletal: Comment: S/p knee replacement  (+)  arthritis,             GI/Hepatic:  - neg GI/hepatic ROS     Renal/Genitourinary: Comment: Nocturia  Erectile dysfunction      Endo: Comment: prediabetes    (+)          thyroid problem, hypothyroidism,           Psychiatric/Substance Use: Comment: 5-6 drinks daily, reports he has weaned down to 4 beers per day now.    Cocaine abuse in remission since treatment in 1987    (+) psychiatric history depression alcohol abuse H/O chronic opiod use .     Infectious Disease:  - neg infectious disease ROS     Malignancy:  - neg malignancy ROS     Other:  - neg other ROS    (+)  , H/O Chronic Pain,         /75 (BP Location: Right arm, Patient Position: Chair, Cuff Size: Adult Large)   Pulse 66   Temp 97.7  F (36.5  C) (Oral)   Ht 1.651 m (5' 5\")   Wt 104.3 kg (230 lb)   SpO2 94%   BMI 38.27 kg/m      Physical Exam - exam completed with patient sitting in chair due to inability to get on raised exam table.   Constitutional: Awake, alert, cooperative, no apparent distress, and appears stated age. obese  Eyes: Pupils equal, round and reactive to light, extra ocular muscles intact, sclera clear, conjunctiva normal.  HENT: Normocephalic, oral pharynx with moist mucus " membranes, good dentition. No goiter appreciated.   Respiratory: Clear to auscultation bilaterally, no crackles or wheezing.  Cardiovascular: Regular rate and rhythm, normal S1 and S2, and systolic murmur noted.  Carotids +2, no bruits. 1+ BLE edema. Palpable pulses to right radial,  DP and PT arteries.  Left radial artery pulse diminished.    GI: deferred  Lymph/Hematologic: No cervical lymphadenopathy and no supraclavicular lymphadenopathy.  Skin: Warm and dry.    Musculoskeletal: Full ROM of neck. There is no redness, warmth, or swelling of the joints. Gross motor strength is decreased.   Neurologic: Awake, alert, oriented to name, place and time. Cranial nerves II-XII are grossly intact. Gait is impaired.   Neuropsychiatric: Calm, cooperative. Normal affect.     Prior Labs/Diagnostic Studies   All labs and imaging personally reviewed     EKG/ stress test - if available please see in ROS above     Hemoglobin  Order: [id number]   suggestion  Information displayed in this report may not trend or trigger automated decision support.     Component  Ref Range & Units 1 mo ago   HEMOGLOBIN                13.5 - 17.5 g/dL 14.3   MCV                        80 - 100 fL 93     Specimen Collected: 10/03/23  8:51 AM    Performed by: [facility] Last Resulted: 10/03/23  9:00 AM   Received From: [employer] [facility] [facility]  Result Received: 10/03/23  3:26 PM    View Encounter        Received Information  CBC WITH AUTO DIFFERENTIAL  Order: [id number]   suggestion  Information displayed in this report may not trend or trigger automated decision support.     Component  Ref Range & Units 4 mo ago   WHITE BLOOD COUNT          4.5 - 11.0 thou/cu mm 5.6   RED BLOOD COUNT            4.30 - 5.90 mil/cu mm 4.75   HEMOGLOBIN                13.5 - 17.5 g/dL 14.2   HEMATOCRIT                37.0 - 53.0 % 43.6   MCV                        80 - 100 fL 92   MCH                        26.0 - 34.0 pg 29.9   MCHC                       32.0 - 36.0 g/dL 32.6   RDW                        11.5 - 15.5 % 13.7   PLATELET COUNT            140 - 440 thou/cu mm 271   MPV                        6.5 - 11.0 fL 9.1   % NEUT  % 56.2   % LYMPH  % 24.0   % MONO  % 12.6   % EOS  % 5.2   % BASO  % 2.0   ABSOLUTE NEUTROPHILS      1.7 - 7.0 thou/cu mm 3.2   ABSOLUTE LYMPHOCYTES      0.9 - 2.9 thou/cu mm 1.4   ABSOLUTE MONOCYTES        <0.9 thou/cu mm 0.7   ABSOLUTE EOSINOPHILS      <0.5 thou/cu mm 0.3   ABSOLUTE BASOPHILS        <0.3 thou/cu mm 0.1     Specimen Collected: 07/28/23 10:15 AM    Performed by: [facility] Last Resulted: 07/28/23 10:26 AM       Device Check  11/14/23  Narrative  Performed by [facility]  Normal dual chamber pacemaker function. This is a [employer] transmission. The device was interrogated to assess data and settings. No atrial or ventricular high rate episodes noted. RV paced 0.7%. Battery voltage WNL. [employer] transmission in 3 months. Please contact [phone] if any questions. mm    The patient's records and results personally reviewed by this provider.     Outside records reviewed from: Care Everywhere    LAB/DIAGNOSTIC STUDIES TODAY:    Component      Latest Ref Rng 12/1/2023  11:25 AM   Sodium      135 - 145 mmol/L 139    Potassium      3.4 - 5.3 mmol/L 4.1    Carbon Dioxide (CO2)      22 - 29 mmol/L 27    Anion Gap      7 - 15 mmol/L 9    Urea Nitrogen      8.0 - 23.0 mg/dL 16.4    Creatinine      0.67 - 1.17 mg/dL 0.64 (L)    GFR Estimate      >60 mL/min/1.73m2 >90    Calcium      8.8 - 10.2 mg/dL 9.3    Chloride      98 - 107 mmol/L 103    Glucose      70 - 99 mg/dL 138 (H)    Alkaline Phosphatase      40 - 150 U/L 84    AST      0 - 45 U/L 31    ALT      0 - 70 U/L 22    Protein Total      6.4 - 8.3 g/dL 7.0    Albumin      3.5 - 5.2 g/dL 4.1    Bilirubin Total      <=1.2 mg/dL 0.8    INR      0.85 - 1.15  1.06       Legend:  (L) Low  (H) High      Assessment    Emile Wood is a 73 year old male seen as  a PAC referral for risk assessment and optimization for anesthesia.    Plan/Recommendations  Pt will be optimized for the proposed procedure.  See below for details on the assessment, risk, and preoperative recommendations    NEUROLOGY  - No history of TIA, CVA or seizure  - Chronic Pain  On chronic opiates, morphine equivilant = 60 MME/Day   -Post Op delirium risk factors:  Age and High co-morbid index    ENT  - No current airway concerns.  Will need to be reassessed day of surgery.  Mallampati: II  TM: > 3    CARDIAC  - following with cardiology for CAD  (s/p prior stenting and CABG), a-fib s/p watchman, severe aortic stenosis s/p TAVR, chronic diastolic heart failure, old MI, pacemaker status, hypertension and hyperlipidemia.  He was seen by them last on 11/15/23 and given approval to proceed with surgery with instructions to remain on aspirin without interruption prior to surgery (due to recent watchman) and an okay to hold plavix 5 days.  Please see their note in the notes tab.  - will message Dr. Zavaleta to ensure he is aware of cardiology recommendation to stay on aspirin. **addendum (12/5/23) - message received back from ERICH Mejia RN and okay for patient to remain on the 81mg aspirin without interruption prior to surgery.**  - hold lasix DOS.  - prior cardiac testing as above.      - METS (Metabolic Equivalents)  Patient CANNOT perform 4 METS exercise without symptoms            Total Score: 1    Functional Capacity: Unable to complete 4 METS      RCRI-High risk: Class 4  >11% complication reate            Total Score: 3    RCRI: High Risk Surgery    RCRI: CAD    RCRI: CHF        PULMONARY    SUHAS Medium Risk            Total Score: 4    SUHAS: Hypertension    SUHAS: BMI over 35 kg/m2    SUHAS: Over 50 ys old    SUHAS: Male      - Denies asthma or inhaler use  - Tobacco History    History   Smoking Status    Former    Types: Cigarettes    Quit date: 2018   Smokeless Tobacco    Former       GI  - denies  "GERD  PONV Medium Risk  Total Score: 2           1 AN PONV: Patient is not a current smoker    1 AN PONV: Intended Post Op Opioids        /RENAL  - hold viagra 24 hours prior to surgery.     ENDOCRINE    - BMI: Estimated body mass index is 38.27 kg/m  as calculated from the following:    Height as of this encounter: 1.651 m (5' 5\").    Weight as of this encounter: 104.3 kg (230 lb).  Obesity (BMI >30)  - No history of Diabetes Mellitus, but is prediabetic.  Monitor closely during admission.     HEME  VTE Low Risk 0.5%            Total Score: 3    VTE: Greater than 59 yrs old    VTE: Male      - aspirin and plavix as above.      MSK  Patient is NOT Frail            Total Score: 1    Frailty: Slower walking speed      -prehab referral not indicated.     - chronic right lower back pain.  Surgery planned as above.      PSYCH  - cocaine abuse in remission since treatment in 1987  - alcohol dependence - reports he was drinking 5-6 drinks (beer, hard alcohol and wine) per day, but has recently weaned down to 4 drinks per day.  He notes that he intends to wean down to no alcohol around 2 weeks prior to surgery.  He denies every having any withdrawal symptoms when he has abstained before.  Discussed with patient the risk of post-op alcohol withdrawals and also encouraged him to consider alcohol dependence treatment if he doesn't feel safe weaning his alcohol as he plans.  Patient verbalized understanding.  Monitor closely post-op as he will be high risk for withdrawals.  Will message Dr. Zavaleta to notify.            Different anesthesia methods/types have been discussed with the patient, but they are aware that the final plan will be decided by the assigned anesthesia provider on the date of service.  Patient was discussed with Dr Ocasio    The patient is optimized for their procedure. AVS with information on surgery time/arrival time, meds and NPO status given by nursing staff. No further diagnostic testing " indicated.      On the day of service:     Prep time: 20 minutes  Visit time: 19 minutes  Documentation/communication time: 25 minutes  ------------------------------------------  Total time: 64 minutes      KARLA Abreu CNP  Preoperative Assessment Center  [facility]  [facility]  Phone: [phone]  Fax: [phone]

## 2023-12-01 NOTE — RESULT ENCOUNTER NOTE
Merlene Baptiste,    Your test results are attached.  Your labs are good for surgery.         Sara Parsons DNP, RN, ANP-C

## 2023-12-20 ENCOUNTER — VIRTUAL VISIT (OUTPATIENT)
Dept: ORTHOPEDICS | Facility: CLINIC | Age: 73
End: 2023-12-20
Payer: MEDICARE

## 2023-12-20 DIAGNOSIS — M43.16 LUMBAR ADJACENT SEGMENT DISEASE WITH SPONDYLOLISTHESIS: ICD-10-CM

## 2023-12-20 DIAGNOSIS — M40.37 FLATBACK SYNDROME OF LUMBOSACRAL REGION: Primary | ICD-10-CM

## 2023-12-20 DIAGNOSIS — I50.32 CHRONIC DIASTOLIC CHF (CONGESTIVE HEART FAILURE) (H): ICD-10-CM

## 2023-12-20 DIAGNOSIS — I48.91 ATRIAL FIBRILLATION, UNSPECIFIED TYPE (H): ICD-10-CM

## 2023-12-20 DIAGNOSIS — Z98.1 S/P LUMBAR FUSION: ICD-10-CM

## 2023-12-20 DIAGNOSIS — M51.369 LUMBAR ADJACENT SEGMENT DISEASE WITH SPONDYLOLISTHESIS: ICD-10-CM

## 2023-12-20 PROCEDURE — 99443 PR PHYSICIAN TELEPHONE EVALUATION 21-30 MIN: CPT | Mod: 95 | Performed by: ORTHOPAEDIC SURGERY

## 2023-12-20 NOTE — PROGRESS NOTES
"Chief Concern:  follow up lumbar stenosis, back and leg pain    History present illness:   Emile Wood is a 72 year old male with remote history of lumbar L2-S1 decompression with Dr Vergara, more recent revision decompression and TLIF at L2-3 with Pablo Gallego who presents today for evaluation of chronic pain of lower back as well as right lower extremity radicular pain in an L5 dermatome     Pain has always been lower right side.  Has to walk with a walker or walking stick due to back pain.  \"Neuropathy pain spikes\" in his feet. He takes lyrica which does help with these symptoms somewhat.     Pelvic incidence is 46, L1-S1 is 11, L4-S1 is 10, T1PA is 33 degrees, PT is 23    I had previously recommended extensive lumbar surgery to address multilevel stenosis, flatback deformity including multilevel TLIF, however upon discussion at our multi-disciplinary complex spine conference we have concern that his medical comorbidity places him at an unacceptable high risk of major perioperative complication with that approach.     I plan to forego the transforaminal lumbar interbody fusions to minmize surgical time and blood loss. I will plan to begin the procedure by decompressing the right lateral recess at L4-5 as well as the right L5 neuroforamen. If there is any concern for hemodynamic status at that point I will abort the instrumented fusion. If patient is very stable from hemodynamic perspective will proceed with instrumented fusion L1 to pelvis and facetectomy at L3-4, L4-5, L5-S1 to mobilize those segments for correction of kyphotic deformity.  I counseled the patient at length regarding the risks of major perioperative complications to which he acknowledged these risks and made clear his wish to proceed.     21:34 on telephone with patient. An addition 30 minutes was spent on reviewing the imaging and developing plan, documentation.      Nacho Zavaleta MD  , Department of Orthopedic " Surgery  [facility]

## 2023-12-20 NOTE — NURSING NOTE
Reason For Visit:   Chief Complaint   Patient presents with    RECHECK     phone visit [phone], Discuss questions prior to surgery // see if patient has quit drinking       Primary MD: Luciano Hogan  Ref. MD: EST    ?  No  Occupation retired.     Date of injury: No  Type of injury: No     Date of surgery & Type:  1999 Lumbar herniated disc     2016 L2-3 BILATERAL LAMINECTOMY DISCECTOMY, L3-5 DECOMPRESSIVE LAMINECTOMY      10/27/2022 Redo left L2-3 decompression with L2-3 Transforaminal Lumbar Interbody Fusion L2-3 posterior lateral instrumentation and fusion      Smoker: No  Request smoking cessation information: No    There were no vitals taken for this visit.    Pain Assessment  Patient Currently in Pain: Yes  0-10 Pain Scale: 6 (with movement)  Primary Pain Location: Back    Oswestry (RUPERTO) Questionnaire        10/2/2023     4:39 PM   OSWESTRY DISABILITY INDEX   Count 10   Sum 39   Oswestry Score (%) 78 %          Visual Analog Pain Scale  Back Pain Scale 0-10: 6  Right leg pain: 0  Left leg pain: 0  Neck Pain Scale 0-10: 0  Right arm pain: 0  Left arm pain: 0    Promis 10 Assessment         No data to display                         Magdalena Siu LPN

## 2023-12-20 NOTE — LETTER
"    12/20/2023         RE: Emile Wood  [id number]  [facility] [id number]        Dear Colleague,    Thank you for referring your patient, Emile Wood, to the [facility] [place]. Please see a copy of my visit note below.    Chief Concern:  follow up lumbar stenosis, back and leg pain    History present illness:   Emile Wood is a 72 year old male with remote history of lumbar L2-S1 decompression with Dr Vergara, more recent revision decompression and TLIF at L2-3 with Pablo Gallego who presents today for evaluation of chronic pain of lower back as well as right lower extremity radicular pain in an L5 dermatome     Pain has always been lower right side.  Has to walk with a walker or walking stick due to back pain.  \"Neuropathy pain spikes\" in his feet. He takes lyrica which does help with these symptoms somewhat.     Pelvic incidence is 46, L1-S1 is 11, L4-S1 is 10, T1PA is 33 degrees, PT is 23    I had previously recommended extensive lumbar surgery to address multilevel stenosis, flatback deformity including multilevel TLIF, however upon discussion at our multi-disciplinary complex spine conference we have concern that his medical comorbidity places him at an unacceptable high risk of major perioperative complication with that approach.     I plan to forego the transforaminal lumbar interbody fusions to minmize surgical time and blood loss. I will plan to begin the procedure by decompressing the right lateral recess at L4-5 as well as the right L5 neuroforamen. If there is any concern for hemodynamic status at that point I will abort the instrumented fusion. If patient is very stable from hemodynamic perspective will proceed with instrumented fusion L1 to pelvis and facetectomy at L3-4, L4-5, L5-S1 to mobilize those segments for correction of kyphotic deformity.  I counseled the patient at length regarding the risks of major perioperative complications to " which he acknowledged these risks and made clear his wish to proceed.     21:34 on telephone with patient. An addition 30 minutes was spent on reviewing the imaging and developing plan, documentation.      Nacho Zavaleta MD  , Department of Orthopedic Surgery  [facility]

## 2023-12-21 ENCOUNTER — MYC MEDICAL ADVICE (OUTPATIENT)
Dept: ORTHOPEDICS | Facility: CLINIC | Age: 73
End: 2023-12-21
Payer: MEDICARE

## 2023-12-21 ENCOUNTER — TELEPHONE (OUTPATIENT)
Dept: ORTHOPEDICS | Facility: CLINIC | Age: 73
End: 2023-12-21
Payer: MEDICARE

## 2023-12-21 NOTE — TELEPHONE ENCOUNTER
[facility] Call Center    Phone Message    May a detailed message be left on voicemail: yes     Reason for Call: Other: Patient is wondering if the process can be explained on how patient's surgery can be reduced from the 6-8 hours to 4 as  discussed in phone visit with Dr Zavaleta. Please call patient.       Action Taken: Other: [id number]    Travel Screening: Not Applicable

## 2023-12-22 PROBLEM — F10.20 ALCOHOL DEPENDENCE (H): Status: ACTIVE | Noted: 2023-12-22

## 2023-12-25 RX ORDER — DEXMEDETOMIDINE HYDROCHLORIDE 4 UG/ML
.1-1.2 INJECTION, SOLUTION INTRAVENOUS CONTINUOUS
Status: CANCELLED | OUTPATIENT
Start: 2023-12-25

## 2023-12-25 ASSESSMENT — ENCOUNTER SYMPTOMS
DYSRHYTHMIAS: 1
SEIZURES: 0

## 2023-12-25 ASSESSMENT — LIFESTYLE VARIABLES: TOBACCO_USE: 1

## 2023-12-25 NOTE — ANESTHESIA PREPROCEDURE EVALUATION
Pre-Operative H & P     CC:  Preoperative exam to assess for increased cardiopulmonary risk while undergoing surgery and anesthesia.    Date of Encounter: 12/1/2023  Primary Care Physician:  Luciano Hogan     Reason for visit:   No diagnosis found.      HPI  Emile Wood is a 73 year old male who presents for pre-operative H & P in preparation for  Procedure Information       Case: [id number] Date/Time: 12/26/23 0800    Procedure: O-arm/Stealth assisted reinstrumentation Lumbar 2-3, posterior spinal fusion Thoracic 11-Sacral 1, bilateral pelvic fixation ([place]), Fowler-Plummer Osteotomy and Transforaminal lumbar interbody fusion (TLIF) Lumbar 1-2, Lumbar 3-4, Lumbar 4-5, Lumbar 5-Sacral 1, local autograft, allograft bone and Bone Morphogenetic Protein (Spine)    Anesthesia type: General    Diagnosis:       S/P lumbar fusion [Z98.1]      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]      Lumbar adjacent segment disease with spondylolisthesis [M51.36, M43.16]      Chronic diastolic CHF (congestive heart failure) (H) [I50.32]      Atrial fibrillation, unspecified type (H) [I48.91]    Pre-op diagnosis:       S/P lumbar fusion [Z98.1]      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]      Lumbar adjacent segment disease with spondylolisthesis [M51.36, M43.16]      Chronic diastolic CHF (congestive heart failure) (H) [I50.32]      Atrial fibrillation, unspecified type (H) [I48.91]    Location:  OR 09 /  OR    Providers: Nacho Zavaleta MD            Emile Wood is a 73 year old male with CAD, pacemaker status, a-fib s/p watchman, old MI, CAD, chronic diastolic heart failure, aortic stenosis s/p TAVR, hypertension, hyperlipidemia, history of smoking, gout, alcoholic neuropathy, obesity, prediabetes, cocaine abuse in remission and active alcohol dependence that has chronic low back pain, flatback syndrome and lumbar adjacent segment disease with spondylolisthesis that has had 4 prior  spinal surgeries.  He notes that he has had right lower back pain for at least 25 years now.  Exacerbating factors for pain include walking, prolonged sitting, and twisting. Alleviating factors include heat, deep tissue massage, and leaning forward. Dry needle has been tried at PT with relief that is short term only. He has had previous injections without lasting improvement. He routinely takes oxycodone 2x daily and has done so for multiple years.  Patient is severely disabled due to his symptoms and desired surgery so he consulted with Dr. Zavaleta and has been scheduled for the above surgery.     History is obtained from the patientand chart review    Hx of abnormal bleeding or anti-platelet use: aspirin and plavix      Past Medical History  Past Medical History:   Diagnosis Date    Alcohol dependence, continuous drinking behavior (H)     Alcoholic polyneuropathy (H24)     Arthritis     knee and back    CAD (coronary artery disease)     Carotid artery stenosis     Chronic diastolic CHF (congestive heart failure) (H)     Chronic sinusitis     Cocaine abuse in remission (H)     in remission since treatment in 1987    Flatback syndrome of lumbosacral region     Gout, chronic     Heart murmur     Hypertension     Irregular heart beat     hx at fib with 3 cardioversions    Numbness and tingling     peripheral neuropathy    Pacemaker     Peripheral neuropathy     Stented coronary artery     stent and CABG    Thyroid disease     Unspecified atrial fibrillation (H)        Past Surgical History  Past Surgical History:   Procedure Laterality Date    AORTIC VALVE REPLACEMENT      2021    ARTHROPLASTY KNEE  04/29/2013    Procedure: ARTHROPLASTY KNEE;  LEFT TOTAL KNEE ARTHROPLASTY ;  Surgeon: Luciano Mcgraw MD;  Location:  OR    cabg      CARDIAC SURGERY  2002    CABG    COLONOSCOPY N/A 05/02/2018    Procedure: COLONOSCOPY;;  Surgeon: John Vilchis MD;  Location:  OR    ESOPHAGOSCOPY, GASTROSCOPY, DUODENOSCOPY (EGD),  COMBINED N/A 05/02/2018    Procedure: COMBINED ESOPHAGOSCOPY, GASTROSCOPY, DUODENOSCOPY (EGD);  ESOPHAGOSCOPY, GASTROSCOPY, DUODENOSCOPY WITH BIOPSIES AND POLYPECTOMY, COLONOSCOPY WITH POLYPECTOMIES;  Surgeon: John Vilchis MD;  Location:  OR    OPTICAL TRACKING SYSTEM FUSION POSTERIOR SPINE LUMBAR N/A 10/27/2022    Procedure: Redo left L2-3 decompression with L2-3 Transforaminal Lumbar Interbody Fusion L2-3 posterior lateral instrumentation and fusion;  Surgeon: Aung Gallego MD;  Location:  OR    ORTHOPEDIC SURGERY      hallie knee, L4, c5, shoulder       Prior to Admission Medications  Current Outpatient Medications   Medication Sig Dispense Refill    acetaminophen (TYLENOL) 325 MG tablet Take 650 mg by mouth as needed      allopurinol (ZYLOPRIM) 300 MG tablet Take 300 mg by mouth every morning      amiodarone (PACERONE) 200 MG tablet Take 200 mg by mouth every morning      amitriptyline (ELAVIL) 10 MG tablet Take 10 mg by mouth at bedtime      amLODIPine (NORVASC) 10 MG tablet Take 1 tablet by mouth every morning      amoxicillin (AMOXIL) 500 MG capsule Take 2,000 mg by mouth as needed Dental work      aspirin (ASA) 81 MG chewable tablet Take 81 mg by mouth every morning      atorvastatin (LIPITOR) 80 MG tablet Take 80 mg by mouth At Bedtime      B Complex Vitamins (VITAMIN B COMPLEX PO) Take 1 tablet by mouth every morning      Calcium Carb-Cholecalciferol 600-800 MG-UNIT TABS Take 1 tablet by mouth daily (with lunch)      clopidogrel (PLAVIX) 75 MG tablet Take 75 mg by mouth every morning      furosemide (LASIX) 20 MG tablet Take 60 mg by mouth every morning      levothyroxine (SYNTHROID/LEVOTHROID) 175 MCG tablet Take 175 mcg by mouth every morning      nitroglycerin (NITROSTAT) 0.4 MG SL tablet Place 1 tablet under the tongue every 5 minutes as needed for chest pain (CALL 911 IF NOT IMPROVED AFTER THREE CONSECUTIVE DOSES). 25 tablet 0    oxyCODONE-acetaminophen (PERCOCET)  MG per tablet  Take 1 tablet by mouth every 6 hours as needed      Pregabalin (LYRICA) 200 MG capsule Take 200-400 mg by mouth 2 times daily Take 2 capsules in the morning and  one capsule in the evening      sildenafil (VIAGRA) 100 MG tablet Take 100 mg by mouth daily as needed (sexual activity)      vitamin B-12 (CYANOCOBALAMIN) 1000 MCG tablet Take 1,000 mcg by mouth every morning         Allergies  No Known Allergies    Social History  Social History     Socioeconomic History    Marital status:      Spouse name: Not on file    Number of children: 2    Years of education: Not on file    Highest education level: Not on file   Occupational History    Not on file   Tobacco Use    Smoking status: Former     Types: Cigarettes     Quit date:      Years since quittin.9     Passive exposure: Past    Smokeless tobacco: Former    Tobacco comments:     smoke cigars   Vaping Use    Vaping Use: Never used   Substance and Sexual Activity    Alcohol use: Yes     Alcohol/week: 35.0 - 42.0 standard drinks of alcohol     Types: 35 - 42 Standard drinks or equivalent per week     Comment: daily- 4 beers, sometimes glass of wine, shot of whiskey    Drug use: No    Sexual activity: Not on file   Other Topics Concern    Not on file   Social History Narrative    Not on file     Social Determinants of Health     Financial Resource Strain: Not on file   Food Insecurity: Not on file   Transportation Needs: Not on file   Physical Activity: Not on file   Stress: Not on file   Social Connections: Not on file   Interpersonal Safety: Not on file   Housing Stability: Not on file       Family History  Family History   Problem Relation Age of Onset    Cerebrovascular Disease Mother     Cerebrovascular Disease Father     Lung Cancer Father     Heart Disease Brother     Anesthesia Reaction No family hx of     Thrombosis No family hx of        Review of Systems  The complete review of systems is negative other than noted in the HPI or here.    Anesthesia Evaluation   Pt has had prior anesthetic. Type: General and MAC.    No history of anesthetic complications       ROS/MED HX  ENT/Pulmonary:     (+)     SUHAS risk factors,  hypertension, obese,        tobacco use, Past use,                    (-) recent URI   Neurologic: Comment: Lumbar adjacent segment disease with spondylolisthesis   Flatback syndrome    History cervical spine surgery    (+)    peripheral neuropathy, - alcoholic neuropathy.                        (-) no seizures and no CVA   Cardiovascular: Comment: Mild 4.2 cm fusiform dilation of the ascending thoracic aorta, similar to prior.     A fib s/p cardioversion X3  Watchman device        (+) Dyslipidemia hypertension-range: controlled/ Peripheral Vascular Disease-- Other and Carotid Stenosis.  CAD - past MI CABG-date: 2002. stent-after CAB. 3  Taking blood thinners Pt has not received instructions: Instructions Given to patient: ASA 81 mg and Plavix daily. CHF etiology: chronic diastolic Last EF: 60% date: 7/13/23  PETTIT.   pacemaker, Reason placed: SA node dysfunction. type: [employer]tronic, settings: AAIR-DDDR , - Patientt is not dependent on pacemaker.      dysrhythmias, a-fib, Irregular Heartbeat/Palpitations, valvular problems/murmurs type: AS s/p TAVR 2021.    Previous cardiac testing   Echo: Date: 7/13/2023 Results:  4/21/23 Echocardiogram  CONCLUSIONS   h/o TAVR with normal valve function on today's study.   Left ventricular ejection fraction is visually estimated at 60%.   Mild dilation of the aorta is present involving the ascending aorta.   (Maximal dimension 4.4 cm).     No significant change, comparing to echo of Apr 2022.         7/13/23 Limited echocardiogram   CONCLUSIONS   Limited echo. S/P Watchman.     1. Normal left ventricular size and global and regional function.   Ejection fraction is visually estimated at 60%.   2. Normal right ventricle size and normal global function.   3. Trivial pericardial effusion.     Compared  with the previous study dated 4/21/23, trivial pericardial   effusion.     Stress Test:  Date: Results:    ECG Reviewed:  Date: Results:    Cath:  Date: 2021 Results:  Conclusions    1. Severe native coronary artery atherosclerosis.     2. Coronary artery calcification.     3. Patent stents in the first obtuse marginal branch.     4. Patent bypass grafts, including LIMA to left anterior descending   artery   and free radial artery graft to right posterior descending artery.     5. No significant change compared to completion angiography from   3-3-2018.      (-) ICD   METS/Exercise Tolerance: 1 - Eating, dressing Comment: Able to walk one block with walking stick or walker   Hematologic:    (-) history of blood clots and history of blood transfusion   Musculoskeletal: Comment: S/p knee replacement  (+)  arthritis,             GI/Hepatic:  - neg GI/hepatic ROS     Renal/Genitourinary: Comment: Nocturia  Erectile dysfunction   (-) renal disease   Endo: Comment: prediabetes    (+)          thyroid problem, hypothyroidism,           Psychiatric/Substance Use: Comment: 5-6 drinks daily, reports he has weaned down to 4 beers. Last drink 2 weeks ago.     Cocaine abuse in remission since treatment in 1987    (+) psychiatric history depression alcohol abuse H/O chronic opiod use .     Infectious Disease:  - neg infectious disease ROS     Malignancy:  - neg malignancy ROS     Other:  - neg other ROS    (+)  , H/O Chronic Pain,         There were no vitals taken for this visit.    Physical Exam - exam completed with patient sitting in chair due to inability to get on raised exam table.   Constitutional: Awake, alert, cooperative, no apparent distress, and appears stated age. obese  Eyes: Pupils equal, round and reactive to light, extra ocular muscles intact, sclera clear, conjunctiva normal.  HENT: Normocephalic, oral pharynx with moist mucus membranes, good dentition. No goiter appreciated.   Respiratory: Clear to  auscultation bilaterally, no crackles or wheezing.  Cardiovascular: Regular rate and rhythm, normal S1 and S2, and systolic murmur noted.  Carotids +2, no bruits. 1+ BLE edema. Palpable pulses to right radial,  DP and PT arteries.  Left radial artery pulse diminished.    GI: deferred  Lymph/Hematologic: No cervical lymphadenopathy and no supraclavicular lymphadenopathy.  Skin: Warm and dry.    Musculoskeletal: Full ROM of neck. There is no redness, warmth, or swelling of the joints. Gross motor strength is decreased.   Neurologic: Awake, alert, oriented to name, place and time. Cranial nerves II-XII are grossly intact. Gait is impaired.   Neuropsychiatric: Calm, cooperative. Normal affect.     Prior Labs/Diagnostic Studies   All labs and imaging personally reviewed     EKG/ stress test - if available please see in ROS above     Hemoglobin  Order: [id number]   suggestion  Information displayed in this report may not trend or trigger automated decision support.     Component  Ref Range & Units 1 mo ago   HEMOGLOBIN                13.5 - 17.5 g/dL 14.3   MCV                        80 - 100 fL 93     Specimen Collected: 10/03/23  8:51 AM    Performed by: [facility] Last Resulted: 10/03/23  9:00 AM   Received From: [facility]  Result Received: 10/03/23  3:26 PM    View Encounter        Received Information  CBC WITH AUTO DIFFERENTIAL  Order: [id number]   suggestion  Information displayed in this report may not trend or trigger automated decision support.     Component  Ref Range & Units 4 mo ago   WHITE BLOOD COUNT          4.5 - 11.0 thou/cu mm 5.6   RED BLOOD COUNT            4.30 - 5.90 mil/cu mm 4.75   HEMOGLOBIN                13.5 - 17.5 g/dL 14.2   HEMATOCRIT                37.0 - 53.0 % 43.6   MCV                        80 - 100 fL 92   MCH                        26.0 - 34.0 pg 29.9   MCHC                      32.0 - 36.0 g/dL 32.6   RDW                        11.5  - 15.5 % 13.7   PLATELET COUNT            140 - 440 thou/cu mm 271   MPV                        6.5 - 11.0 fL 9.1   % NEUT  % 56.2   % LYMPH  % 24.0   % MONO  % 12.6   % EOS  % 5.2   % BASO  % 2.0   ABSOLUTE NEUTROPHILS      1.7 - 7.0 thou/cu mm 3.2   ABSOLUTE LYMPHOCYTES      0.9 - 2.9 thou/cu mm 1.4   ABSOLUTE MONOCYTES        <0.9 thou/cu mm 0.7   ABSOLUTE EOSINOPHILS      <0.5 thou/cu mm 0.3   ABSOLUTE BASOPHILS        <0.3 thou/cu mm 0.1     Specimen Collected: 07/28/23 10:15 AM    Performed by: [facility] Last Resulted: 07/28/23 10:26 AM       Device Check  11/14/23  Narrative  Performed by [facility]  Normal dual chamber pacemaker function. This is a [employer] transmission. The device was interrogated to assess data and settings. No atrial or ventricular high rate episodes noted. RV paced 0.7%. Battery voltage WNL. [employer] transmission in 3 months. Please contact [phone] if any questions. mm    The patient's records and results personally reviewed by this provider.     Outside records reviewed from: Care Everywhere    LAB/DIAGNOSTIC STUDIES TODAY:    Component      Latest Ref Rng 12/1/2023  11:25 AM   Sodium      135 - 145 mmol/L 139    Potassium      3.4 - 5.3 mmol/L 4.1    Carbon Dioxide (CO2)      22 - 29 mmol/L 27    Anion Gap      7 - 15 mmol/L 9    Urea Nitrogen      8.0 - 23.0 mg/dL 16.4    Creatinine      0.67 - 1.17 mg/dL 0.64 (L)    GFR Estimate      >60 mL/min/1.73m2 >90    Calcium      8.8 - 10.2 mg/dL 9.3    Chloride      98 - 107 mmol/L 103    Glucose      70 - 99 mg/dL 138 (H)    Alkaline Phosphatase      40 - 150 U/L 84    AST      0 - 45 U/L 31    ALT      0 - 70 U/L 22    Protein Total      6.4 - 8.3 g/dL 7.0    Albumin      3.5 - 5.2 g/dL 4.1    Bilirubin Total      <=1.2 mg/dL 0.8    INR      0.85 - 1.15  1.06       Legend:  (L) Low  (H) High      Assessment    Emile Wood is a 73 year old male seen as a PAC referral for risk assessment and optimization for  anesthesia.    Plan/Recommendations  Pt will be optimized for the proposed procedure.  See below for details on the assessment, risk, and preoperative recommendations    NEUROLOGY  - No history of TIA, CVA or seizure  - Chronic Pain  On chronic opiates, morphine equivilant = 60 MME/Day   -Post Op delirium risk factors:  Age and High co-morbid index    ENT  - No current airway concerns.  Will need to be reassessed day of surgery.  Mallampati: II  TM: > 3    CARDIAC  - following with cardiology for CAD  (s/p prior stenting and CABG), a-fib s/p watchman, severe aortic stenosis s/p TAVR, chronic diastolic heart failure, old MI, pacemaker status, hypertension and hyperlipidemia.  He was seen by them last on 11/15/23 and given approval to proceed with surgery with instructions to remain on aspirin without interruption prior to surgery (due to recent watchman) and an okay to hold plavix 5 days.  Please see their note in the notes tab.  - will message Dr. Zavaleta to ensure he is aware of cardiology recommendation to stay on aspirin. **addendum (12/5/23) - message received back from ERICH Mejia RN and okay for patient to remain on the 81mg aspirin without interruption prior to surgery.**  - hold lasix DOS.  - prior cardiac testing as above.      - METS (Metabolic Equivalents)  Patient CANNOT perform 4 METS exercise without symptoms            Total Score: 1    Functional Capacity: Unable to complete 4 METS      RCRI-High risk: Class 4  >11% complication reate            Total Score: 3    RCRI: High Risk Surgery    RCRI: CAD    RCRI: CHF        PULMONARY    SUHAS Medium Risk            Total Score: 4    SUHAS: Hypertension    SUHAS: BMI over 35 kg/m2    SUHAS: Over 50 ys old    SUHAS: Male      - Denies asthma or inhaler use  - Tobacco History    History   Smoking Status    Former    Types: Cigarettes    Quit date: 2018   Smokeless Tobacco    Former       GI  - denies GERD  PONV Medium Risk  Total Score: 2           1 AN PONV:  "Patient is not a current smoker    1 AN PONV: Intended Post Op Opioids        /RENAL  - hold viagra 24 hours prior to surgery.     ENDOCRINE    - BMI: Estimated body mass index is 38.27 kg/m  as calculated from the following:    Height as of 12/1/23: 1.651 m (5' 5\").    Weight as of 12/1/23: 104.3 kg (230 lb).  Obesity (BMI >30)  - No history of Diabetes Mellitus, but is prediabetic.  Monitor closely during admission.     HEME  VTE Low Risk 0.5%            Total Score: 3    VTE: Greater than 59 yrs old    VTE: Male      - aspirin and plavix as above.      MSK  Patient is NOT Frail            Total Score: 1    Frailty: Slower walking speed      -prehab referral not indicated.     - chronic right lower back pain.  Surgery planned as above.      PSYCH  - cocaine abuse in remission since treatment in 1987  - alcohol dependence - reports he was drinking 5-6 drinks (beer, hard alcohol and wine) per day, but has recently weaned down to 4 drinks per day.  He notes that he intends to wean down to no alcohol around 2 weeks prior to surgery.  He denies every having any withdrawal symptoms when he has abstained before.  Discussed with patient the risk of post-op alcohol withdrawals and also encouraged him to consider alcohol dependence treatment if he doesn't feel safe weaning his alcohol as he plans.  Patient verbalized understanding.  Monitor closely post-op as he will be high risk for withdrawals.  Will message Dr. Zavaleta to notify.            Different anesthesia methods/types have been discussed with the patient, but they are aware that the final plan will be decided by the assigned anesthesia provider on the date of service.  Patient was discussed with Dr Ocasio    The patient is optimized for their procedure. AVS with information on surgery time/arrival time, meds and NPO status given by nursing staff. No further diagnostic testing indicated.      On the day of service:     Prep time: 20 minutes  Visit time: 19 " minutes  Documentation/communication time: 25 minutes  ------------------------------------------  Total time: 64 minutes      KARLA Abreu CNP  Preoperative Assessment Center  [facility]  [facility]  Phone: [phone]  Fax: [phone]    Physical Exam    Airway        Mallampati: II   TM distance: > 3 FB   Neck ROM: limited   Mouth opening: > 3 cm    Respiratory Devices and Support         Dental       (+) Modest Abnormalities - crowns, retainers, 1 or 2 missing teeth      Cardiovascular          Rhythm and rate: regular and normal     Pulmonary           breath sounds clear to auscultation           Anesthesia Plan    ASA Status:  3    NPO Status:  NPO Appropriate    Anesthesia Type: General.     - Airway: ETT   Induction: Intravenous.   Maintenance: Balanced.   Techniques and Equipment:     - Airway: Shoulder Bella/Ramp, Video-Laryngoscope     - Lines/Monitors: 2nd IV, Arterial Line, BIS, Central Line (Clear sight)     - Blood: T&S     - Drips/Meds: Phenylephrine, Dexmed. infusion, Tranexamic acid, Ketamine (Lidocaine, propofol)     Consents    Anesthesia Plan(s) and associated risks, benefits, and realistic alternatives discussed. Questions answered and patient/representative(s) expressed understanding.     - Discussed:     - Discussed with:  Patient, Spouse      - Extended Intubation/Ventilatory Support Discussed: Yes.      - Patient is DNR/DNI Status: No     Use of blood products discussed: Yes.     - Discussed with: Patient.     - Consented: consented to blood products     Postoperative Care    Pain management: IV analgesics, Oral pain medications, Multi-modal analgesia, Peripheral nerve block (Single Shot).   PONV prophylaxis: Ondansetron (or other 5HT-3), Background Propofol Infusion     Comments:    Other Comments: Pacemaker reprogrammed to asynchronous in pre-op. Will have it returned to original programming in PACU.

## 2023-12-26 ENCOUNTER — ANESTHESIA (OUTPATIENT)
Dept: SURGERY | Facility: CLINIC | Age: 73
DRG: 456 | End: 2023-12-26
Payer: MEDICARE

## 2023-12-26 ENCOUNTER — APPOINTMENT (OUTPATIENT)
Dept: GENERAL RADIOLOGY | Facility: CLINIC | Age: 73
DRG: 456 | End: 2023-12-26
Attending: ORTHOPAEDIC SURGERY
Payer: MEDICARE

## 2023-12-26 ENCOUNTER — HOSPITAL ENCOUNTER (INPATIENT)
Facility: CLINIC | Age: 73
LOS: 8 days | Discharge: NURSING, REHAB OR LONG-TERM CARE | DRG: 456 | End: 2024-01-03
Attending: ORTHOPAEDIC SURGERY | Admitting: ORTHOPAEDIC SURGERY
Payer: MEDICARE

## 2023-12-26 ENCOUNTER — ANCILLARY PROCEDURE (OUTPATIENT)
Dept: CARDIOLOGY | Facility: CLINIC | Age: 73
DRG: 456 | End: 2023-12-26
Attending: INTERNAL MEDICINE
Payer: MEDICARE

## 2023-12-26 DIAGNOSIS — G89.4 CHRONIC PAIN SYNDROME: ICD-10-CM

## 2023-12-26 DIAGNOSIS — G62.1 ALCOHOLIC POLYNEUROPATHY (H): ICD-10-CM

## 2023-12-26 DIAGNOSIS — M43.16 LUMBAR ADJACENT SEGMENT DISEASE WITH SPONDYLOLISTHESIS: ICD-10-CM

## 2023-12-26 DIAGNOSIS — M51.369 LUMBAR ADJACENT SEGMENT DISEASE WITH SPONDYLOLISTHESIS: ICD-10-CM

## 2023-12-26 DIAGNOSIS — Z45.02 FITTING AND ADJUSTMENT OF AUTOMATIC IMPLANTABLE CARDIOVERTER-DEFIBRILLATOR: Primary | ICD-10-CM

## 2023-12-26 DIAGNOSIS — I48.91 ATRIAL FIBRILLATION, UNSPECIFIED TYPE (H): ICD-10-CM

## 2023-12-26 DIAGNOSIS — Z98.1 S/P LUMBAR FUSION: Primary | ICD-10-CM

## 2023-12-26 DIAGNOSIS — I50.32 CHRONIC DIASTOLIC CHF (CONGESTIVE HEART FAILURE) (H): ICD-10-CM

## 2023-12-26 DIAGNOSIS — Z45.02 FITTING AND ADJUSTMENT OF AUTOMATIC IMPLANTABLE CARDIOVERTER-DEFIBRILLATOR: ICD-10-CM

## 2023-12-26 DIAGNOSIS — M40.37 FLATBACK SYNDROME OF LUMBOSACRAL REGION: ICD-10-CM

## 2023-12-26 DIAGNOSIS — F10.29 ALCOHOL DEPENDENCE WITH UNSPECIFIED ALCOHOL-INDUCED DISORDER (H): ICD-10-CM

## 2023-12-26 DIAGNOSIS — I10 HYPERTENSION, UNSPECIFIED TYPE: ICD-10-CM

## 2023-12-26 LAB
ABO/RH(D): NORMAL
ANION GAP SERPL CALCULATED.3IONS-SCNC: 13 MMOL/L (ref 7–15)
ANTIBODY SCREEN: NEGATIVE
APTT PPP: 32 SECONDS (ref 22–38)
ATRIAL RATE - MUSE: 68 BPM
BASE EXCESS BLDA CALC-SCNC: -1 MMOL/L (ref -9.6–2)
BASE EXCESS BLDA CALC-SCNC: -1.7 MMOL/L (ref -9.6–2)
BASE EXCESS BLDA CALC-SCNC: 0.4 MMOL/L (ref -9.6–2)
BASOPHILS # BLD AUTO: 0.1 10E3/UL (ref 0–0.2)
BASOPHILS NFR BLD AUTO: 2 %
BUN SERPL-MCNC: 20.6 MG/DL (ref 8–23)
CA-I BLD-MCNC: 4.4 MG/DL (ref 4.4–5.2)
CA-I BLD-MCNC: 4.5 MG/DL (ref 4.4–5.2)
CA-I BLD-MCNC: 4.7 MG/DL (ref 4.4–5.2)
CALCIUM SERPL-MCNC: 9.4 MG/DL (ref 8.8–10.2)
CHLORIDE SERPL-SCNC: 103 MMOL/L (ref 98–107)
CREAT SERPL-MCNC: 0.76 MG/DL (ref 0.67–1.17)
DEPRECATED HCO3 PLAS-SCNC: 25 MMOL/L (ref 22–29)
DIASTOLIC BLOOD PRESSURE - MUSE: NORMAL MMHG
EGFRCR SERPLBLD CKD-EPI 2021: >90 ML/MIN/1.73M2
EOSINOPHIL # BLD AUTO: 0.3 10E3/UL (ref 0–0.7)
EOSINOPHIL NFR BLD AUTO: 5 %
ERYTHROCYTE [DISTWIDTH] IN BLOOD BY AUTOMATED COUNT: 13.7 % (ref 10–15)
GLUCOSE BLD-MCNC: 161 MG/DL (ref 70–99)
GLUCOSE BLD-MCNC: 205 MG/DL (ref 70–99)
GLUCOSE BLD-MCNC: 205 MG/DL (ref 70–99)
GLUCOSE BLDC GLUCOMTR-MCNC: 109 MG/DL (ref 70–99)
GLUCOSE BLDC GLUCOMTR-MCNC: 127 MG/DL (ref 70–99)
GLUCOSE BLDC GLUCOMTR-MCNC: 129 MG/DL (ref 70–99)
GLUCOSE BLDC GLUCOMTR-MCNC: 144 MG/DL (ref 70–99)
GLUCOSE BLDC GLUCOMTR-MCNC: 177 MG/DL (ref 70–99)
GLUCOSE BLDC GLUCOMTR-MCNC: 210 MG/DL (ref 70–99)
GLUCOSE SERPL-MCNC: 117 MG/DL (ref 70–99)
GRAM STAIN RESULT: NORMAL
HBA1C MFR BLD: 6.2 %
HCO3 BLDA-SCNC: 24 MMOL/L (ref 21–28)
HCO3 BLDA-SCNC: 25 MMOL/L (ref 21–28)
HCO3 BLDA-SCNC: 26 MMOL/L (ref 21–28)
HCT VFR BLD AUTO: 44.2 % (ref 40–53)
HGB BLD-MCNC: 13.6 G/DL (ref 13.3–17.7)
HGB BLD-MCNC: 13.7 G/DL (ref 13.3–17.7)
HGB BLD-MCNC: 13.8 G/DL (ref 13.3–17.7)
HGB BLD-MCNC: 14.7 G/DL (ref 13.3–17.7)
IMM GRANULOCYTES # BLD: 0 10E3/UL
IMM GRANULOCYTES NFR BLD: 0 %
INR PPP: 1.05 (ref 0.85–1.15)
INTERPRETATION ECG - MUSE: NORMAL
LACTATE BLD-SCNC: 2.4 MMOL/L
LACTATE BLD-SCNC: 3.1 MMOL/L
LACTATE BLD-SCNC: 3.5 MMOL/L
LYMPHOCYTES # BLD AUTO: 1.9 10E3/UL (ref 0.8–5.3)
LYMPHOCYTES NFR BLD AUTO: 26 %
MAGNESIUM SERPL-MCNC: 2.4 MG/DL (ref 1.7–2.3)
MCH RBC QN AUTO: 30.6 PG (ref 26.5–33)
MCHC RBC AUTO-ENTMCNC: 33.3 G/DL (ref 31.5–36.5)
MCV RBC AUTO: 92 FL (ref 78–100)
MONOCYTES # BLD AUTO: 1.1 10E3/UL (ref 0–1.3)
MONOCYTES NFR BLD AUTO: 14 %
NEUTROPHILS # BLD AUTO: 4 10E3/UL (ref 1.6–8.3)
NEUTROPHILS NFR BLD AUTO: 53 %
NRBC # BLD AUTO: 0 10E3/UL
NRBC BLD AUTO-RTO: 0 /100
O2/TOTAL GAS SETTING VFR VENT: 50 %
O2/TOTAL GAS SETTING VFR VENT: 60 %
O2/TOTAL GAS SETTING VFR VENT: 60 %
P AXIS - MUSE: -20 DEGREES
PCO2 BLDA: 42 MM HG (ref 35–45)
PCO2 BLDA: 43 MM HG (ref 35–45)
PCO2 BLDA: 43 MM HG (ref 35–45)
PH BLDA: 7.36 [PH] (ref 7.35–7.45)
PH BLDA: 7.36 [PH] (ref 7.35–7.45)
PH BLDA: 7.39 [PH] (ref 7.35–7.45)
PHOSPHATE SERPL-MCNC: 3.2 MG/DL (ref 2.5–4.5)
PLATELET # BLD AUTO: 259 10E3/UL (ref 150–450)
PO2 BLDA: 192 MM HG (ref 80–105)
PO2 BLDA: 244 MM HG (ref 80–105)
PO2 BLDA: 253 MM HG (ref 80–105)
POTASSIUM BLD-SCNC: 4.2 MMOL/L (ref 3.5–5)
POTASSIUM BLD-SCNC: 4.3 MMOL/L (ref 3.5–5)
POTASSIUM BLD-SCNC: 4.3 MMOL/L (ref 3.5–5)
POTASSIUM SERPL-SCNC: 4 MMOL/L (ref 3.4–5.3)
PR INTERVAL - MUSE: 414 MS
QRS DURATION - MUSE: 108 MS
QT - MUSE: 442 MS
QTC - MUSE: 469 MS
R AXIS - MUSE: 3 DEGREES
RBC # BLD AUTO: 4.81 10E6/UL (ref 4.4–5.9)
SODIUM BLD-SCNC: 139 MMOL/L (ref 135–145)
SODIUM BLD-SCNC: 139 MMOL/L (ref 135–145)
SODIUM BLD-SCNC: 141 MMOL/L (ref 135–145)
SODIUM SERPL-SCNC: 141 MMOL/L (ref 135–145)
SPECIMEN EXPIRATION DATE: NORMAL
SYSTOLIC BLOOD PRESSURE - MUSE: NORMAL MMHG
T AXIS - MUSE: 22 DEGREES
VENTRICULAR RATE- MUSE: 68 BPM
WBC # BLD AUTO: 7.4 10E3/UL (ref 4–11)

## 2023-12-26 PROCEDURE — C1713 ANCHOR/SCREW BN/BN,TIS/BN: HCPCS | Performed by: ORTHOPAEDIC SURGERY

## 2023-12-26 PROCEDURE — 250N000009 HC RX 250

## 2023-12-26 PROCEDURE — 85730 THROMBOPLASTIN TIME PARTIAL: CPT | Performed by: ORTHOPAEDIC SURGERY

## 2023-12-26 PROCEDURE — 99223 1ST HOSP IP/OBS HIGH 75: CPT | Mod: FS | Performed by: PHYSICIAN ASSISTANT

## 2023-12-26 PROCEDURE — 999N000141 HC STATISTIC PRE-PROCEDURE NURSING ASSESSMENT: Performed by: ORTHOPAEDIC SURGERY

## 2023-12-26 PROCEDURE — 36415 COLL VENOUS BLD VENIPUNCTURE: CPT | Performed by: ORTHOPAEDIC SURGERY

## 2023-12-26 PROCEDURE — 87070 CULTURE OTHR SPECIMN AEROBIC: CPT | Performed by: ORTHOPAEDIC SURGERY

## 2023-12-26 PROCEDURE — 84295 ASSAY OF SERUM SODIUM: CPT

## 2023-12-26 PROCEDURE — 93286 PERI-PX EVAL PM/LDLS PM IP: CPT

## 2023-12-26 PROCEDURE — 80048 BASIC METABOLIC PNL TOTAL CA: CPT | Performed by: ORTHOPAEDIC SURGERY

## 2023-12-26 PROCEDURE — 86900 BLOOD TYPING SEROLOGIC ABO: CPT | Performed by: ORTHOPAEDIC SURGERY

## 2023-12-26 PROCEDURE — 120N000002 HC R&B MED SURG/OB UMMC

## 2023-12-26 PROCEDURE — 999N000179 XR SURGERY CARM FLUORO LESS THAN 5 MIN W STILLS: Mod: TC

## 2023-12-26 PROCEDURE — XRGE058 FUSION OF RIGHT SACROILIAC JOINT USING INTERNAL FIXATION DEVICE WITH TULIP CONNECTOR, OPEN APPROACH, NEW TECHNOLOGY GROUP 8: ICD-10-PCS | Performed by: ORTHOPAEDIC SURGERY

## 2023-12-26 PROCEDURE — 250N000011 HC RX IP 250 OP 636: Performed by: ORTHOPAEDIC SURGERY

## 2023-12-26 PROCEDURE — 250N000011 HC RX IP 250 OP 636: Mod: JZ

## 2023-12-26 PROCEDURE — 87102 FUNGUS ISOLATION CULTURE: CPT | Performed by: ORTHOPAEDIC SURGERY

## 2023-12-26 PROCEDURE — 99207 PR APP CREDIT; MD BILLING SHARED VISIT: CPT | Performed by: STUDENT IN AN ORGANIZED HEALTH CARE EDUCATION/TRAINING PROGRAM

## 2023-12-26 PROCEDURE — 258N000003 HC RX IP 258 OP 636

## 2023-12-26 PROCEDURE — 87205 SMEAR GRAM STAIN: CPT | Performed by: ORTHOPAEDIC SURGERY

## 2023-12-26 PROCEDURE — 250N000025 HC SEVOFLURANE, PER MIN: Performed by: ORTHOPAEDIC SURGERY

## 2023-12-26 PROCEDURE — 250N000011 HC RX IP 250 OP 636

## 2023-12-26 PROCEDURE — 250N000011 HC RX IP 250 OP 636: Mod: JZ | Performed by: STUDENT IN AN ORGANIZED HEALTH CARE EDUCATION/TRAINING PROGRAM

## 2023-12-26 PROCEDURE — 83036 HEMOGLOBIN GLYCOSYLATED A1C: CPT | Performed by: ORTHOPAEDIC SURGERY

## 2023-12-26 PROCEDURE — 710N000010 HC RECOVERY PHASE 1, LEVEL 2, PER MIN: Performed by: ORTHOPAEDIC SURGERY

## 2023-12-26 PROCEDURE — 272N000004 HC RX 272: Performed by: ORTHOPAEDIC SURGERY

## 2023-12-26 PROCEDURE — XRGF058 FUSION OF LEFT SACROILIAC JOINT USING INTERNAL FIXATION DEVICE WITH TULIP CONNECTOR, OPEN APPROACH, NEW TECHNOLOGY GROUP 8: ICD-10-PCS | Performed by: ORTHOPAEDIC SURGERY

## 2023-12-26 PROCEDURE — 250N000009 HC RX 250: Performed by: STUDENT IN AN ORGANIZED HEALTH CARE EDUCATION/TRAINING PROGRAM

## 2023-12-26 PROCEDURE — 258N000003 HC RX IP 258 OP 636: Performed by: ORTHOPAEDIC SURGERY

## 2023-12-26 PROCEDURE — 360N000086 HC SURGERY LEVEL 6 W/ FLUORO, PER MIN: Performed by: ORTHOPAEDIC SURGERY

## 2023-12-26 PROCEDURE — 01NB0ZZ RELEASE LUMBAR NERVE, OPEN APPROACH: ICD-10-PCS | Performed by: ORTHOPAEDIC SURGERY

## 2023-12-26 PROCEDURE — 999N000054 HC STATISTIC EKG NON-CHARGEABLE

## 2023-12-26 PROCEDURE — 250N000013 HC RX MED GY IP 250 OP 250 PS 637

## 2023-12-26 PROCEDURE — 0QS004Z REPOSITION LUMBAR VERTEBRA WITH INTERNAL FIXATION DEVICE, OPEN APPROACH: ICD-10-PCS | Performed by: ORTHOPAEDIC SURGERY

## 2023-12-26 PROCEDURE — 0SG3071 FUSION OF LUMBOSACRAL JOINT WITH AUTOLOGOUS TISSUE SUBSTITUTE, POSTERIOR APPROACH, POSTERIOR COLUMN, OPEN APPROACH: ICD-10-PCS | Performed by: ORTHOPAEDIC SURGERY

## 2023-12-26 PROCEDURE — 272N000001 HC OR GENERAL SUPPLY STERILE: Performed by: ORTHOPAEDIC SURGERY

## 2023-12-26 PROCEDURE — 82803 BLOOD GASES ANY COMBINATION: CPT

## 2023-12-26 PROCEDURE — 84100 ASSAY OF PHOSPHORUS: CPT | Performed by: PHYSICIAN ASSISTANT

## 2023-12-26 PROCEDURE — 85610 PROTHROMBIN TIME: CPT | Performed by: ORTHOPAEDIC SURGERY

## 2023-12-26 PROCEDURE — 83735 ASSAY OF MAGNESIUM: CPT | Performed by: PHYSICIAN ASSISTANT

## 2023-12-26 PROCEDURE — L8699 PROSTHETIC IMPLANT NOS: HCPCS | Performed by: ORTHOPAEDIC SURGERY

## 2023-12-26 PROCEDURE — 0RGA071 FUSION OF THORACOLUMBAR VERTEBRAL JOINT WITH AUTOLOGOUS TISSUE SUBSTITUTE, POSTERIOR APPROACH, POSTERIOR COLUMN, OPEN APPROACH: ICD-10-PCS | Performed by: ORTHOPAEDIC SURGERY

## 2023-12-26 PROCEDURE — 8E0WXBG COMPUTER ASSISTED PROCEDURE OF TRUNK REGION, WITH COMPUTERIZED TOMOGRAPHY: ICD-10-PCS | Performed by: ORTHOPAEDIC SURGERY

## 2023-12-26 PROCEDURE — 85025 COMPLETE CBC W/AUTO DIFF WBC: CPT | Performed by: ORTHOPAEDIC SURGERY

## 2023-12-26 PROCEDURE — 250N000013 HC RX MED GY IP 250 OP 250 PS 637: Performed by: ORTHOPAEDIC SURGERY

## 2023-12-26 PROCEDURE — 93286 PERI-PX EVAL PM/LDLS PM IP: CPT | Mod: 26 | Performed by: INTERNAL MEDICINE

## 2023-12-26 PROCEDURE — 87075 CULTR BACTERIA EXCEPT BLOOD: CPT | Performed by: ORTHOPAEDIC SURGERY

## 2023-12-26 PROCEDURE — 250N000013 HC RX MED GY IP 250 OP 250 PS 637: Performed by: ANESTHESIOLOGY

## 2023-12-26 PROCEDURE — 0SG1071 FUSION OF 2 OR MORE LUMBAR VERTEBRAL JOINTS WITH AUTOLOGOUS TISSUE SUBSTITUTE, POSTERIOR APPROACH, POSTERIOR COLUMN, OPEN APPROACH: ICD-10-PCS | Performed by: ORTHOPAEDIC SURGERY

## 2023-12-26 PROCEDURE — 250N000009 HC RX 250: Performed by: ORTHOPAEDIC SURGERY

## 2023-12-26 PROCEDURE — 250N000013 HC RX MED GY IP 250 OP 250 PS 637: Performed by: PHYSICIAN ASSISTANT

## 2023-12-26 PROCEDURE — 370N000017 HC ANESTHESIA TECHNICAL FEE, PER MIN: Performed by: ORTHOPAEDIC SURGERY

## 2023-12-26 PROCEDURE — C1762 CONN TISS, HUMAN(INC FASCIA): HCPCS | Performed by: ORTHOPAEDIC SURGERY

## 2023-12-26 DEVICE — IMP SCR SET DOMINO MEDT 5.5 TO 5.5MM 779170005: Type: IMPLANTABLE DEVICE | Site: SPINE LUMBAR | Status: FUNCTIONAL

## 2023-12-26 DEVICE — IMPLANTABLE DEVICE: Type: IMPLANTABLE DEVICE | Site: SPINE LUMBAR | Status: FUNCTIONAL

## 2023-12-26 DEVICE — IMP SCR MEDT 5.5/6.0MM SOLERA 6.5X45MM MA 55840006545: Type: IMPLANTABLE DEVICE | Site: SPINE LUMBAR | Status: FUNCTIONAL

## 2023-12-26 DEVICE — GRAFT BONE INFUSE BMP LG 7510600: Type: IMPLANTABLE DEVICE | Site: SPINE LUMBAR | Status: FUNCTIONAL

## 2023-12-26 DEVICE — BONE CHIP CANCELLOUS 30CC 100030: Type: IMPLANTABLE DEVICE | Site: SPINE LUMBAR | Status: FUNCTIONAL

## 2023-12-26 DEVICE — IMP CONNECTOR DOMINO MEDT 5.5MM 779145555: Type: IMPLANTABLE DEVICE | Site: SPINE LUMBAR | Status: FUNCTIONAL

## 2023-12-26 DEVICE — 10.5 MM X 100 MM IFUSE BEDROCK GRANITE IMPLANT
Type: IMPLANTABLE DEVICE | Site: PELVIS | Status: FUNCTIONAL
Brand: IFUSE GRANITE IMPLANT SYSTEM

## 2023-12-26 DEVICE — IMP ROD MEDT SOLERA LINED 5.5X500MM CHR 1555200500: Type: IMPLANTABLE DEVICE | Site: SPINE LUMBAR | Status: FUNCTIONAL

## 2023-12-26 DEVICE — MAGNETOS EASYPACK PUTTY 15CC 1-2MM USA
Type: IMPLANTABLE DEVICE | Site: SPINE LUMBAR | Status: FUNCTIONAL
Brand: MAGNETOS

## 2023-12-26 DEVICE — IMP SCR MEDT 5.5/6.0MM SOLERA 7.5X50MM MA 55840007550: Type: IMPLANTABLE DEVICE | Site: SPINE LUMBAR | Status: FUNCTIONAL

## 2023-12-26 RX ORDER — OXYCODONE HYDROCHLORIDE 10 MG/1
10 TABLET ORAL EVERY 4 HOURS PRN
Status: DISCONTINUED | OUTPATIENT
Start: 2023-12-26 | End: 2023-12-28

## 2023-12-26 RX ORDER — AMITRIPTYLINE HYDROCHLORIDE 10 MG/1
10 TABLET ORAL AT BEDTIME
Status: DISCONTINUED | OUTPATIENT
Start: 2023-12-27 | End: 2024-01-03 | Stop reason: HOSPADM

## 2023-12-26 RX ORDER — AMOXICILLIN 250 MG
1 CAPSULE ORAL 2 TIMES DAILY
Status: DISCONTINUED | OUTPATIENT
Start: 2023-12-26 | End: 2024-01-03 | Stop reason: HOSPADM

## 2023-12-26 RX ORDER — ALLOPURINOL 300 MG/1
300 TABLET ORAL EVERY MORNING
Status: DISCONTINUED | OUTPATIENT
Start: 2023-12-27 | End: 2024-01-03 | Stop reason: HOSPADM

## 2023-12-26 RX ORDER — PROCHLORPERAZINE MALEATE 5 MG
5 TABLET ORAL EVERY 6 HOURS PRN
Status: DISCONTINUED | OUTPATIENT
Start: 2023-12-26 | End: 2024-01-03 | Stop reason: HOSPADM

## 2023-12-26 RX ORDER — BISACODYL 10 MG
10 SUPPOSITORY, RECTAL RECTAL DAILY PRN
Status: DISCONTINUED | OUTPATIENT
Start: 2023-12-26 | End: 2024-01-03 | Stop reason: HOSPADM

## 2023-12-26 RX ORDER — FLUMAZENIL 0.1 MG/ML
0.2 INJECTION, SOLUTION INTRAVENOUS
Status: DISCONTINUED | OUTPATIENT
Start: 2023-12-26 | End: 2024-01-03 | Stop reason: HOSPADM

## 2023-12-26 RX ORDER — AMLODIPINE BESYLATE 10 MG/1
10 TABLET ORAL EVERY MORNING
Status: DISCONTINUED | OUTPATIENT
Start: 2023-12-27 | End: 2024-01-03 | Stop reason: HOSPADM

## 2023-12-26 RX ORDER — ACETAMINOPHEN 325 MG/1
975 TABLET ORAL EVERY 8 HOURS
Status: DISCONTINUED | OUTPATIENT
Start: 2023-12-26 | End: 2023-12-28

## 2023-12-26 RX ORDER — FOLIC ACID 1 MG/1
1 TABLET ORAL DAILY
Status: DISCONTINUED | OUTPATIENT
Start: 2023-12-27 | End: 2024-01-03 | Stop reason: HOSPADM

## 2023-12-26 RX ORDER — CEFAZOLIN SODIUM/WATER 2 G/20 ML
2 SYRINGE (ML) INTRAVENOUS
Status: COMPLETED | OUTPATIENT
Start: 2023-12-26 | End: 2023-12-26

## 2023-12-26 RX ORDER — OXYCODONE HYDROCHLORIDE 5 MG/1
5 TABLET ORAL EVERY 4 HOURS PRN
Status: DISCONTINUED | OUTPATIENT
Start: 2023-12-26 | End: 2023-12-28

## 2023-12-26 RX ORDER — DEXTROSE MONOHYDRATE 100 MG/ML
INJECTION, SOLUTION INTRAVENOUS CONTINUOUS PRN
Status: DISCONTINUED | OUTPATIENT
Start: 2023-12-26 | End: 2023-12-26 | Stop reason: HOSPADM

## 2023-12-26 RX ORDER — TRANEXAMIC ACID 10 MG/ML
10 INJECTION, SOLUTION INTRAVENOUS CONTINUOUS
Status: DISCONTINUED | OUTPATIENT
Start: 2023-12-26 | End: 2023-12-26 | Stop reason: HOSPADM

## 2023-12-26 RX ORDER — NALOXONE HYDROCHLORIDE 0.4 MG/ML
0.2 INJECTION, SOLUTION INTRAMUSCULAR; INTRAVENOUS; SUBCUTANEOUS
Status: DISCONTINUED | OUTPATIENT
Start: 2023-12-26 | End: 2024-01-03 | Stop reason: HOSPADM

## 2023-12-26 RX ORDER — LABETALOL HYDROCHLORIDE 5 MG/ML
10 INJECTION, SOLUTION INTRAVENOUS
Status: DISCONTINUED | OUTPATIENT
Start: 2023-12-26 | End: 2023-12-26 | Stop reason: HOSPADM

## 2023-12-26 RX ORDER — LEVOTHYROXINE SODIUM 175 UG/1
175 TABLET ORAL EVERY MORNING
Status: DISCONTINUED | OUTPATIENT
Start: 2023-12-27 | End: 2024-01-03 | Stop reason: HOSPADM

## 2023-12-26 RX ORDER — HYDROMORPHONE HCL IN WATER/PF 6 MG/30 ML
0.4 PATIENT CONTROLLED ANALGESIA SYRINGE INTRAVENOUS EVERY 5 MIN PRN
Status: DISCONTINUED | OUTPATIENT
Start: 2023-12-26 | End: 2023-12-26 | Stop reason: HOSPADM

## 2023-12-26 RX ORDER — HYDROMORPHONE HCL IN WATER/PF 6 MG/30 ML
0.4 PATIENT CONTROLLED ANALGESIA SYRINGE INTRAVENOUS
Status: DISCONTINUED | OUTPATIENT
Start: 2023-12-26 | End: 2024-01-01

## 2023-12-26 RX ORDER — ONDANSETRON 2 MG/ML
4 INJECTION INTRAMUSCULAR; INTRAVENOUS EVERY 6 HOURS PRN
Status: DISCONTINUED | OUTPATIENT
Start: 2023-12-26 | End: 2024-01-03 | Stop reason: HOSPADM

## 2023-12-26 RX ORDER — NALOXONE HYDROCHLORIDE 0.4 MG/ML
0.4 INJECTION, SOLUTION INTRAMUSCULAR; INTRAVENOUS; SUBCUTANEOUS
Status: DISCONTINUED | OUTPATIENT
Start: 2023-12-26 | End: 2024-01-03 | Stop reason: HOSPADM

## 2023-12-26 RX ORDER — ONDANSETRON 4 MG/1
4 TABLET, ORALLY DISINTEGRATING ORAL EVERY 30 MIN PRN
Status: DISCONTINUED | OUTPATIENT
Start: 2023-12-26 | End: 2023-12-26 | Stop reason: HOSPADM

## 2023-12-26 RX ORDER — MULTIPLE VITAMINS W/ MINERALS TAB 9MG-400MCG
1 TAB ORAL DAILY
Status: DISCONTINUED | OUTPATIENT
Start: 2023-12-27 | End: 2024-01-03 | Stop reason: HOSPADM

## 2023-12-26 RX ORDER — CEFAZOLIN SODIUM 2 G/100ML
2 INJECTION, SOLUTION INTRAVENOUS EVERY 8 HOURS
Status: COMPLETED | OUTPATIENT
Start: 2023-12-26 | End: 2023-12-29

## 2023-12-26 RX ORDER — POLYETHYLENE GLYCOL 3350 17 G/17G
17 POWDER, FOR SOLUTION ORAL DAILY
Status: DISCONTINUED | OUTPATIENT
Start: 2023-12-27 | End: 2024-01-03 | Stop reason: HOSPADM

## 2023-12-26 RX ORDER — PROPOFOL 10 MG/ML
INJECTION, EMULSION INTRAVENOUS PRN
Status: DISCONTINUED | OUTPATIENT
Start: 2023-12-26 | End: 2023-12-26

## 2023-12-26 RX ORDER — LIDOCAINE 40 MG/G
CREAM TOPICAL
Status: DISCONTINUED | OUTPATIENT
Start: 2023-12-26 | End: 2024-01-03 | Stop reason: HOSPADM

## 2023-12-26 RX ORDER — LIDOCAINE HYDROCHLORIDE 20 MG/ML
INJECTION, SOLUTION INFILTRATION; PERINEURAL PRN
Status: DISCONTINUED | OUTPATIENT
Start: 2023-12-26 | End: 2023-12-26

## 2023-12-26 RX ORDER — HYDROMORPHONE HCL IN WATER/PF 6 MG/30 ML
0.2 PATIENT CONTROLLED ANALGESIA SYRINGE INTRAVENOUS
Status: DISCONTINUED | OUTPATIENT
Start: 2023-12-26 | End: 2024-01-01

## 2023-12-26 RX ORDER — CALCIUM CHLORIDE 100 MG/ML
INJECTION INTRAVENOUS; INTRAVENTRICULAR PRN
Status: DISCONTINUED | OUTPATIENT
Start: 2023-12-26 | End: 2023-12-26

## 2023-12-26 RX ORDER — NICOTINE POLACRILEX 4 MG
15-30 LOZENGE BUCCAL
Status: DISCONTINUED | OUTPATIENT
Start: 2023-12-26 | End: 2023-12-26 | Stop reason: HOSPADM

## 2023-12-26 RX ORDER — LIDOCAINE HYDROCHLORIDE ANHYDROUS AND DEXTROSE MONOHYDRATE .8; 5 G/100ML; G/100ML
0.5 INJECTION, SOLUTION INTRAVENOUS CONTINUOUS
Status: ACTIVE | OUTPATIENT
Start: 2023-12-26 | End: 2023-12-28

## 2023-12-26 RX ORDER — PREGABALIN 100 MG/1
200 CAPSULE ORAL EVERY EVENING
Status: DISCONTINUED | OUTPATIENT
Start: 2023-12-26 | End: 2024-01-03 | Stop reason: HOSPADM

## 2023-12-26 RX ORDER — ACETAMINOPHEN 325 MG/1
975 TABLET ORAL ONCE
Status: DISCONTINUED | OUTPATIENT
Start: 2023-12-26 | End: 2023-12-26 | Stop reason: HOSPADM

## 2023-12-26 RX ORDER — GABAPENTIN 100 MG/1
100 CAPSULE ORAL
Status: COMPLETED | OUTPATIENT
Start: 2023-12-26 | End: 2023-12-26

## 2023-12-26 RX ORDER — VANCOMYCIN HYDROCHLORIDE 1 G/20ML
INJECTION, POWDER, LYOPHILIZED, FOR SOLUTION INTRAVENOUS PRN
Status: DISCONTINUED | OUTPATIENT
Start: 2023-12-26 | End: 2023-12-26 | Stop reason: HOSPADM

## 2023-12-26 RX ORDER — CELECOXIB 200 MG/1
200 CAPSULE ORAL ONCE
Status: COMPLETED | OUTPATIENT
Start: 2023-12-26 | End: 2023-12-26

## 2023-12-26 RX ORDER — ONDANSETRON 4 MG/1
4 TABLET, ORALLY DISINTEGRATING ORAL EVERY 6 HOURS PRN
Status: DISCONTINUED | OUTPATIENT
Start: 2023-12-26 | End: 2024-01-03 | Stop reason: HOSPADM

## 2023-12-26 RX ORDER — LIDOCAINE HYDROCHLORIDE ANHYDROUS AND DEXTROSE MONOHYDRATE .8; 5 G/100ML; G/100ML
0.5 INJECTION, SOLUTION INTRAVENOUS CONTINUOUS
Status: DISPENSED | OUTPATIENT
Start: 2023-12-26 | End: 2023-12-28

## 2023-12-26 RX ORDER — SODIUM CHLORIDE 9 MG/ML
INJECTION, SOLUTION INTRAVENOUS CONTINUOUS
Status: DISCONTINUED | OUTPATIENT
Start: 2023-12-26 | End: 2023-12-30

## 2023-12-26 RX ORDER — AMIODARONE HYDROCHLORIDE 200 MG/1
200 TABLET ORAL EVERY MORNING
Status: DISCONTINUED | OUTPATIENT
Start: 2023-12-27 | End: 2024-01-03 | Stop reason: HOSPADM

## 2023-12-26 RX ORDER — ACETAMINOPHEN 325 MG/1
650 TABLET ORAL EVERY 4 HOURS PRN
Status: DISCONTINUED | OUTPATIENT
Start: 2023-12-29 | End: 2023-12-28

## 2023-12-26 RX ORDER — KETAMINE HYDROCHLORIDE 10 MG/ML
INJECTION INTRAMUSCULAR; INTRAVENOUS PRN
Status: DISCONTINUED | OUTPATIENT
Start: 2023-12-26 | End: 2023-12-26

## 2023-12-26 RX ORDER — SODIUM CHLORIDE, SODIUM LACTATE, POTASSIUM CHLORIDE, CALCIUM CHLORIDE 600; 310; 30; 20 MG/100ML; MG/100ML; MG/100ML; MG/100ML
INJECTION, SOLUTION INTRAVENOUS CONTINUOUS PRN
Status: DISCONTINUED | OUTPATIENT
Start: 2023-12-26 | End: 2023-12-26

## 2023-12-26 RX ORDER — SODIUM CHLORIDE, SODIUM LACTATE, POTASSIUM CHLORIDE, CALCIUM CHLORIDE 600; 310; 30; 20 MG/100ML; MG/100ML; MG/100ML; MG/100ML
INJECTION, SOLUTION INTRAVENOUS CONTINUOUS
Status: DISCONTINUED | OUTPATIENT
Start: 2023-12-26 | End: 2023-12-26 | Stop reason: HOSPADM

## 2023-12-26 RX ORDER — FENTANYL CITRATE 50 UG/ML
INJECTION, SOLUTION INTRAMUSCULAR; INTRAVENOUS PRN
Status: DISCONTINUED | OUTPATIENT
Start: 2023-12-26 | End: 2023-12-26

## 2023-12-26 RX ORDER — CEFAZOLIN SODIUM/WATER 2 G/20 ML
2 SYRINGE (ML) INTRAVENOUS SEE ADMIN INSTRUCTIONS
Status: DISCONTINUED | OUTPATIENT
Start: 2023-12-26 | End: 2023-12-26 | Stop reason: HOSPADM

## 2023-12-26 RX ORDER — HYDROMORPHONE HCL IN WATER/PF 6 MG/30 ML
0.2 PATIENT CONTROLLED ANALGESIA SYRINGE INTRAVENOUS EVERY 5 MIN PRN
Status: DISCONTINUED | OUTPATIENT
Start: 2023-12-26 | End: 2023-12-26 | Stop reason: HOSPADM

## 2023-12-26 RX ORDER — ONDANSETRON 2 MG/ML
INJECTION INTRAMUSCULAR; INTRAVENOUS PRN
Status: DISCONTINUED | OUTPATIENT
Start: 2023-12-26 | End: 2023-12-26

## 2023-12-26 RX ORDER — DEXTROSE MONOHYDRATE 25 G/50ML
25-50 INJECTION, SOLUTION INTRAVENOUS
Status: DISCONTINUED | OUTPATIENT
Start: 2023-12-26 | End: 2023-12-26 | Stop reason: HOSPADM

## 2023-12-26 RX ORDER — FAMOTIDINE 20 MG/1
20 TABLET, FILM COATED ORAL 2 TIMES DAILY
Status: DISCONTINUED | OUTPATIENT
Start: 2023-12-26 | End: 2024-01-03 | Stop reason: HOSPADM

## 2023-12-26 RX ORDER — PHENYLEPHRINE HCL IN 0.9% NACL 50MG/250ML
PLASTIC BAG, INJECTION (ML) INTRAVENOUS CONTINUOUS PRN
Status: DISCONTINUED | OUTPATIENT
Start: 2023-12-26 | End: 2023-12-26

## 2023-12-26 RX ORDER — ONDANSETRON 2 MG/ML
4 INJECTION INTRAMUSCULAR; INTRAVENOUS EVERY 30 MIN PRN
Status: DISCONTINUED | OUTPATIENT
Start: 2023-12-26 | End: 2023-12-26 | Stop reason: HOSPADM

## 2023-12-26 RX ORDER — ATORVASTATIN CALCIUM 80 MG/1
80 TABLET, FILM COATED ORAL AT BEDTIME
Status: DISCONTINUED | OUTPATIENT
Start: 2023-12-27 | End: 2023-12-27

## 2023-12-26 RX ORDER — DEXAMETHASONE SODIUM PHOSPHATE 4 MG/ML
INJECTION, SOLUTION INTRA-ARTICULAR; INTRALESIONAL; INTRAMUSCULAR; INTRAVENOUS; SOFT TISSUE PRN
Status: DISCONTINUED | OUTPATIENT
Start: 2023-12-26 | End: 2023-12-26

## 2023-12-26 RX ORDER — PREGABALIN 100 MG/1
400 CAPSULE ORAL EVERY MORNING
Status: DISCONTINUED | OUTPATIENT
Start: 2023-12-27 | End: 2024-01-03 | Stop reason: HOSPADM

## 2023-12-26 RX ORDER — FENTANYL CITRATE 50 UG/ML
50 INJECTION, SOLUTION INTRAMUSCULAR; INTRAVENOUS EVERY 5 MIN PRN
Status: DISCONTINUED | OUTPATIENT
Start: 2023-12-26 | End: 2023-12-26 | Stop reason: HOSPADM

## 2023-12-26 RX ORDER — FUROSEMIDE 20 MG
60 TABLET ORAL EVERY MORNING
Status: DISCONTINUED | OUTPATIENT
Start: 2023-12-27 | End: 2023-12-28

## 2023-12-26 RX ORDER — METHOCARBAMOL 500 MG/1
500 TABLET, FILM COATED ORAL EVERY 6 HOURS PRN
Status: DISCONTINUED | OUTPATIENT
Start: 2023-12-26 | End: 2023-12-29

## 2023-12-26 RX ORDER — HYDROXYZINE HYDROCHLORIDE 10 MG/1
10 TABLET, FILM COATED ORAL EVERY 6 HOURS PRN
Status: DISCONTINUED | OUTPATIENT
Start: 2023-12-26 | End: 2023-12-30

## 2023-12-26 RX ORDER — FENTANYL CITRATE 50 UG/ML
25 INJECTION, SOLUTION INTRAMUSCULAR; INTRAVENOUS EVERY 5 MIN PRN
Status: DISCONTINUED | OUTPATIENT
Start: 2023-12-26 | End: 2023-12-26 | Stop reason: HOSPADM

## 2023-12-26 RX ADMIN — PHENYLEPHRINE HYDROCHLORIDE 200 MCG: 10 INJECTION INTRAVENOUS at 09:56

## 2023-12-26 RX ADMIN — INSULIN HUMAN 5 UNITS/HR: 1 INJECTION, SOLUTION INTRAVENOUS at 16:14

## 2023-12-26 RX ADMIN — FENTANYL CITRATE 100 MCG: 50 INJECTION INTRAMUSCULAR; INTRAVENOUS at 09:25

## 2023-12-26 RX ADMIN — PHENYLEPHRINE HYDROCHLORIDE 100 MCG: 10 INJECTION INTRAVENOUS at 09:01

## 2023-12-26 RX ADMIN — GABAPENTIN 100 MG: 100 CAPSULE ORAL at 07:35

## 2023-12-26 RX ADMIN — HYDROMORPHONE HYDROCHLORIDE 0.5 MG: 1 INJECTION, SOLUTION INTRAMUSCULAR; INTRAVENOUS; SUBCUTANEOUS at 14:11

## 2023-12-26 RX ADMIN — Medication 10 MG: at 08:50

## 2023-12-26 RX ADMIN — PREGABALIN 200 MG: 100 CAPSULE ORAL at 22:05

## 2023-12-26 RX ADMIN — FENTANYL CITRATE 50 MCG: 50 INJECTION, SOLUTION INTRAMUSCULAR; INTRAVENOUS at 15:08

## 2023-12-26 RX ADMIN — Medication 20 MG: at 09:48

## 2023-12-26 RX ADMIN — PHENYLEPHRINE HYDROCHLORIDE 100 MCG: 10 INJECTION INTRAVENOUS at 09:02

## 2023-12-26 RX ADMIN — SODIUM CHLORIDE: 9 INJECTION, SOLUTION INTRAVENOUS at 17:18

## 2023-12-26 RX ADMIN — HYDROMORPHONE HYDROCHLORIDE 0.5 MG: 1 INJECTION, SOLUTION INTRAMUSCULAR; INTRAVENOUS; SUBCUTANEOUS at 13:54

## 2023-12-26 RX ADMIN — PHENYLEPHRINE HYDROCHLORIDE 100 MCG: 10 INJECTION INTRAVENOUS at 14:12

## 2023-12-26 RX ADMIN — SODIUM CHLORIDE, POTASSIUM CHLORIDE, SODIUM LACTATE AND CALCIUM CHLORIDE: 600; 310; 30; 20 INJECTION, SOLUTION INTRAVENOUS at 11:04

## 2023-12-26 RX ADMIN — INSULIN HUMAN 2 UNITS/HR: 1 INJECTION, SOLUTION INTRAVENOUS at 12:57

## 2023-12-26 RX ADMIN — HYDROMORPHONE HYDROCHLORIDE 0.4 MG: 0.2 INJECTION, SOLUTION INTRAMUSCULAR; INTRAVENOUS; SUBCUTANEOUS at 15:47

## 2023-12-26 RX ADMIN — CEFAZOLIN SODIUM 2 G: 2 INJECTION, SOLUTION INTRAVENOUS at 22:13

## 2023-12-26 RX ADMIN — PHENYLEPHRINE HYDROCHLORIDE 150 MCG: 10 INJECTION INTRAVENOUS at 11:38

## 2023-12-26 RX ADMIN — FENTANYL CITRATE 100 MCG: 50 INJECTION INTRAMUSCULAR; INTRAVENOUS at 08:24

## 2023-12-26 RX ADMIN — ONDANSETRON 4 MG: 2 INJECTION INTRAMUSCULAR; INTRAVENOUS at 13:52

## 2023-12-26 RX ADMIN — PHENYLEPHRINE HYDROCHLORIDE 100 MCG: 10 INJECTION INTRAVENOUS at 11:31

## 2023-12-26 RX ADMIN — TRANEXAMIC ACID 3135 MG: 1 INJECTION, SOLUTION INTRAVENOUS at 08:57

## 2023-12-26 RX ADMIN — Medication 10 MG: at 11:08

## 2023-12-26 RX ADMIN — SUGAMMADEX 200 MG: 100 INJECTION, SOLUTION INTRAVENOUS at 14:20

## 2023-12-26 RX ADMIN — TRANEXAMIC ACID 10 MG/KG/HR: 10 INJECTION, SOLUTION INTRAVENOUS at 09:21

## 2023-12-26 RX ADMIN — Medication 20 MG: at 12:05

## 2023-12-26 RX ADMIN — TRANEXAMIC ACID 10 MG/KG/HR: 10 INJECTION, SOLUTION INTRAVENOUS at 11:19

## 2023-12-26 RX ADMIN — Medication 20 MG: at 09:23

## 2023-12-26 RX ADMIN — SENNOSIDES AND DOCUSATE SODIUM 1 TABLET: 50; 8.6 TABLET ORAL at 22:06

## 2023-12-26 RX ADMIN — CELECOXIB 200 MG: 200 CAPSULE ORAL at 07:35

## 2023-12-26 RX ADMIN — ACETAMINOPHEN 975 MG: 325 TABLET, FILM COATED ORAL at 22:05

## 2023-12-26 RX ADMIN — SODIUM CHLORIDE, POTASSIUM CHLORIDE, SODIUM LACTATE AND CALCIUM CHLORIDE: 600; 310; 30; 20 INJECTION, SOLUTION INTRAVENOUS at 13:45

## 2023-12-26 RX ADMIN — PROPOFOL 150 MG: 10 INJECTION, EMULSION INTRAVENOUS at 08:24

## 2023-12-26 RX ADMIN — Medication 2 G: at 12:38

## 2023-12-26 RX ADMIN — TRANEXAMIC ACID 10 MG/KG/HR: 10 INJECTION, SOLUTION INTRAVENOUS at 10:19

## 2023-12-26 RX ADMIN — TRANEXAMIC ACID 10 MG/KG/HR: 10 INJECTION, SOLUTION INTRAVENOUS at 13:17

## 2023-12-26 RX ADMIN — SODIUM CHLORIDE, SODIUM LACTATE, POTASSIUM CHLORIDE, CALCIUM CHLORIDE: 600; 310; 30; 20 INJECTION, SOLUTION INTRAVENOUS at 08:37

## 2023-12-26 RX ADMIN — PROPOFOL 50 MG: 10 INJECTION, EMULSION INTRAVENOUS at 08:26

## 2023-12-26 RX ADMIN — PHENYLEPHRINE HYDROCHLORIDE 100 MCG: 10 INJECTION INTRAVENOUS at 09:13

## 2023-12-26 RX ADMIN — SODIUM CHLORIDE, POTASSIUM CHLORIDE, SODIUM LACTATE AND CALCIUM CHLORIDE: 600; 310; 30; 20 INJECTION, SOLUTION INTRAVENOUS at 12:48

## 2023-12-26 RX ADMIN — CALCIUM CHLORIDE INJECTION 1 G: 100 INJECTION, SOLUTION INTRAVENOUS at 13:48

## 2023-12-26 RX ADMIN — Medication 50 MG: at 08:25

## 2023-12-26 RX ADMIN — Medication 0.3 MCG/KG/MIN: at 09:13

## 2023-12-26 RX ADMIN — FAMOTIDINE 20 MG: 20 TABLET ORAL at 22:05

## 2023-12-26 RX ADMIN — PHENYLEPHRINE HYDROCHLORIDE 100 MCG: 10 INJECTION INTRAVENOUS at 09:07

## 2023-12-26 RX ADMIN — Medication 10 MG: at 12:56

## 2023-12-26 RX ADMIN — FENTANYL CITRATE 50 MCG: 50 INJECTION, SOLUTION INTRAMUSCULAR; INTRAVENOUS at 14:49

## 2023-12-26 RX ADMIN — Medication 2 G: at 08:38

## 2023-12-26 RX ADMIN — LIDOCAINE HYDROCHLORIDE 100 MG: 20 INJECTION, SOLUTION INFILTRATION; PERINEURAL at 08:24

## 2023-12-26 RX ADMIN — SODIUM CHLORIDE, POTASSIUM CHLORIDE, SODIUM LACTATE AND CALCIUM CHLORIDE: 600; 310; 30; 20 INJECTION, SOLUTION INTRAVENOUS at 08:20

## 2023-12-26 RX ADMIN — LIDOCAINE HYDROCHLORIDE 0.5 MG/KG/HR: 8 INJECTION, SOLUTION INTRAVENOUS at 17:57

## 2023-12-26 RX ADMIN — KETAMINE HYDROCHLORIDE 10 MG/HR: 100 INJECTION, SOLUTION, CONCENTRATE INTRAMUSCULAR; INTRAVENOUS at 09:03

## 2023-12-26 RX ADMIN — PHENYLEPHRINE HYDROCHLORIDE 100 MCG: 10 INJECTION INTRAVENOUS at 09:55

## 2023-12-26 RX ADMIN — PHENYLEPHRINE HYDROCHLORIDE 200 MCG: 10 INJECTION INTRAVENOUS at 10:05

## 2023-12-26 RX ADMIN — DEXAMETHASONE SODIUM PHOSPHATE 10 MG: 4 INJECTION, SOLUTION INTRA-ARTICULAR; INTRALESIONAL; INTRAMUSCULAR; INTRAVENOUS; SOFT TISSUE at 08:59

## 2023-12-26 RX ADMIN — Medication 20 MG: at 08:24

## 2023-12-26 ASSESSMENT — ACTIVITIES OF DAILY LIVING (ADL)
ADLS_ACUITY_SCORE: 29
DIFFICULTY_COMMUNICATING: NO
ADLS_ACUITY_SCORE: 29
ADLS_ACUITY_SCORE: 23
TOILETING_ISSUES: NO
DIFFICULTY_EATING/SWALLOWING: NO
DRESSING/BATHING: BATHING DIFFICULTY, REQUIRES EQUIPMENT;DRESSING DIFFICULTY, ASSISTANCE 1 PERSON;BATHING DIFFICULTY, ASSISTANCE 1 PERSON
ADLS_ACUITY_SCORE: 29
HEARING_DIFFICULTY_OR_DEAF: NO
DOING_ERRANDS_INDEPENDENTLY_DIFFICULTY: NO
WALKING_OR_CLIMBING_STAIRS_DIFFICULTY: YES
FALL_HISTORY_WITHIN_LAST_SIX_MONTHS: NO
DRESSING/BATHING_DIFFICULTY: YES
ADLS_ACUITY_SCORE: 29
CONCENTRATING,_REMEMBERING_OR_MAKING_DECISIONS_DIFFICULTY: NO
ADLS_ACUITY_SCORE: 29
WEAR_GLASSES_OR_BLIND: YES
EQUIPMENT_CURRENTLY_USED_AT_HOME: WALKER, STANDARD
CHANGE_IN_FUNCTIONAL_STATUS_SINCE_ONSET_OF_CURRENT_ILLNESS/INJURY: NO
ADLS_ACUITY_SCORE: 31
WALKING_OR_CLIMBING_STAIRS: AMBULATION DIFFICULTY, REQUIRES EQUIPMENT;STAIR CLIMBING DIFFICULTY, REQUIRES EQUIPMENT

## 2023-12-26 NOTE — BRIEF OP NOTE
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]  Chronic diastolic CHF (congestive heart failure) (H) [I50.32]  Atrial fibrillation, unspecified type (H) [I48.91]  Post-operative diagnosis Same as pre-operative diagnosis    Procedure: O-arm/Stealth assisted reinstrumentation Lumbar 2-3, posterior spinal fusion Thoracic 12-Sacral 1, bilateral pelvic fixation (Kemper), Fowler-Plummer Osteotomy Lumbar 4-5, Sacral 1, use of morselized local autograft, allograft bone and Bone Morphogenetic Protein, N/A - Spine    Surgeon: Surgeon(s) and Role:     * Nacho Zavaleta MD - Primary     * Diego Fofana MD - Resident - Assisting  Anesthesia: General   Estimated Blood Loss: 1000cc    Drains: Sean-Sparks  Specimens:   ID Type Source Tests Collected by Time Destination   A : Left L3 Tissue Spine, Lumbar ANAEROBIC BACTERIAL CULTURE ROUTINE, GRAM STAIN, FUNGAL OR YEAST CULTURE ROUTINE, AEROBIC BACTERIAL CULTURE ROUTINE Nacho Zavaleta MD 12/26/2023  9:36 AM    B : Right L2, 3, fusion mass Tissue Spine, Lumbar ANAEROBIC BACTERIAL CULTURE ROUTINE, GRAM STAIN, FUNGAL OR YEAST CULTURE ROUTINE, AEROBIC BACTERIAL CULTURE ROUTINE Nacho Zavaleta MD 12/26/2023  9:43 AM    C : L2-3 fusion mass Tissue Other ANAEROBIC BACTERIAL CULTURE ROUTINE, GRAM STAIN, FUNGAL OR YEAST CULTURE ROUTINE, AEROBIC BACTERIAL CULTURE ROUTINE Nacho Zavaleta MD 12/26/2023  9:57 AM      Findings:   See full op note .  Complications: None.  Implants:   Implant Name Type Inv. Item Serial No.  Lot No. LRB No. Used Action   IMP SPNL IFUSE BEDROCK GRANITE 10.5 MM X 100 MM [id number] - [id number] Metallic Hardware/[place] IMP SPNL IFUSE BEDROCK GRANITE 10.5 MM X 100 MM [id number] [employer]  N/A 1 Implanted    IMP SPNL IFUSE BEDROCK GRANITE 10.5 MM X 100 MM [id number] - [id number] Metallic Hardware/Ray IMP SPNL IFUSE BEDROCK GRANITE 10.5 MM X 100 MM [id number] [employer]  N/A 1 Implanted   IMP SPNL IFUSE BEDROCK GRANITE 10.5 MM X 100 MM [id number] - [id number] Metallic Hardware/Ray IMP SPNL IFUSE BEDROCK GRANITE 10.5 MM X 100 MM [id number] [employer]  N/A 1 Implanted   GRAFT BONE INFUSE BMP LG [id number] - [id number]  GRAFT BONE INFUSE BMP LG [id number]  [employer]ANE[employer] [id number] N/A 1 Implanted   BONE CHIP CANCELLOUS 30CC [id number] - [id number] Bone/Tissue/Biologic BONE CHIP CANCELLOUS 30CC [id number]   N/A 1 Implanted   BONE CHIP CANCELLOUS 30CC [id number] - [id number] Bone/Tissue/Biologic BONE CHIP CANCELLOUS 30CC [id number]   N/A 1 Implanted   BONE GRAFT PUTTY MAGNETOS EASYPACK PUTTY 15CC 703-054-US - [id number]  BONE GRAFT PUTTY MAGNETOS EASYPACK PUTTY 15CC 703-054-US  [employer]  N/A 1 Implanted         Ortho Primary  Activity: Up with assist until independent. No excessive bending or twisting. No lifting >10 lbs x 6 weeks. No Sharri lift for transfers.   Weight bearing status: WBAT.  Pain management: Transition from IV to PO as tolerated.    Antibiotics: Ancef until drains are removed.  Diet: Begin with clear fluids and progress diet as tolerated. Zachery for 2 weeks postop  DVT prophylaxis: SCDs only. No chemical DVT ppx needed.  Imaging: AP and lateral Xrays to be obtained in the PACU, XR Upright L spine PTDC - ordered.  Labs: hgb pod1  Bracing/Splinting: None.  Dressings: Keep dressing c/d/i   Drains: Document output per shift, will be discontinued at Orthopedic Surgery discretion.  Bernal catheter: Remove POD#1.   Physical Therapy/Occupational Therapy: Eval and treat.  Consults: Hospitalist.  Follow-up: Clinic with Dr. Zavaleta in 6 weeks with repeat x-rays.   Disposition: Pending progress with therapies, pain control on orals,  post-op imaging, and drain removal.    Orthopaedics surgery staff for this patient is Dr. Zavaleta.    ------------------------------------------------------------------------------------------    [   ] Drains removed.  [   ] Post xrays done.  [   ] Discharge orders done.  [   ] Discharge summary started.    Diego Fofana MD  Orthopedic Surgery, PGY-4

## 2023-12-26 NOTE — CONSULTS
[facility]  Consult Note - Hospitalist Service, GOLD TEAM   Date of Admission:  12/26/2023  Consult Requested by: Dr. Fofana  Reason for Consult: Medical co-management    Assessment & Plan   Emiel Wood is a 73 year old male patient with a past medical history significant for persistent atrial fibrillation (cardioversion x 3 attempts), CAD s/p CABG 2002 and s/p PCI to proximal to mid LCx with overlapping 3.5 x 16 mm and 3.0 x 28 mm Synergy BLANCA and OM2 with 3.0 x 12 mm Synergy to OM2 on 3/3/18 in setting of STEMI, diastolic heart failure, severe aortic stenosis s/p 29 mm Hartman Jovany 3 bioprosthetic TAVR 5/26/21, and SSS s/p PPM after TAVR, bleed risk (LLE hematoma after a fall) so 7/13/23 he underwent successful left atrial appendage closure with 35 mm Watchman FLX device. Additionally, history of HTN, HLD, PVD, carotid artery stenosis (<50% stenosis R & L internal carotid arteries 2019), mild 4.2cm fusiform dilation of the ascending thoracic aorta, hypothyroidism, former smoker, gout, alcoholic neuropathy with current alcohol dependence (4 beers per day), cocaine abuse in remission, depression, chronic opiate use, obesity, prediabetes, s/p LTKA, flatback syndrome, chronic low back pain, L2-S1 decompression (2016), TLIF L2-3 (2022) who was admitted to [facility] after undergoing posterior spinal fusion T12-S1, bilateral pelvic fixation on 12/26/23 at [place] with Dr. Zavaleta.    Patient seen in [place] PACU prior to [facility] transfer.    Recommendations:   - Consider resume ASA if possible per Cardiology preference. Okay from their standpoint to continue holding Plavix for now.   - Ketamine and Amiodarone concurrently can increase risk of bradyarrhythmias. Patient has a pacemaker in place for SSS. Though, would recommend not increasing Ketamine and considering other avenues for pain control if paged about pain. Patient doing okay on 5mg/hr Ketamine drip in  PACU currently without bradycardia on telemetry. Will continue telemetry (ordered).   - Monitor for etoh withdrawal and add benzodiazepine if needed (CIWA scoring ordered).   - If utilizing insulin drip for BG control beyond OR and PACU (prediabetes history and received 10mg IV Decadron x 1), please notify medicine team and we will be happy to assist with BG management if needed.    S/p Posterior Spinal Fusion T12-S1 (12/26/23)  Ortho primary. EBL: 1000cc.   Procedures:  -Exploration of fusion mass L2-3  -Reinstrumentation L2-3  -Segmental instrumentation T12-L1, L4-S1  -Bilateral pelvic fixation with 22 modifier for stacked S2 AIS screws on patient's right  -Smith-Plummer osteotomy L4-5 and L5-S1  -Posterior spinal fusion T12-S1  -Use of morselized local autograft bone  -Use of allograft bone with bone morphogenic protein synthetic graft extender   - Management per primary orthopedics team including pain control, activity, antibiotics, DVT prophylaxis, wound cares. Please refer to their documentation for details.  -Activity: Up with assist until independent. Avoid repetitive lifting, bending, twisting  -Weight bearing status: No lifting greater than 10 lbs. .  -Pain management: Oral pain medications with IV for breakthrough. Wean IV as able.    -Antibiotics: Ancef x 24 hours; if drain in place continue IV antibiotics until drains out  -Diet: Begin with clear fluids and progress diet as tolerated. Zachery nutritional supplement twice daily  -DVT prophylaxis: Mechanical prophylaxis with SCD  -Imaging: Standing radiographs when able  -Labs: Monitor Hgb   -Bracing/Splinting: None.   -Dressings: Keep clean, dry and intact. Patient may shower with bandage in place. Dressing to be removed 2 weeks after surgery. Please change or reinforce as necessary for strikethrough or saturation.   -Drains: Medium hemovac drain to be removed when output < 30 ml per shift   - Continue PTA Lyrica   - Bernal in place, consider remove  tomorrow    Lactic Acidosis post-op  Lactate 2.4-->3.1-->3.5 intra-op within setting of muscle damage related to procedure. Hemoglobin pre-op 14.7-->13.9 post-op. VBG with normal pH.   - Infection deemed highly unlikely and elevated lactate most likely related to expected muscle injury during procedure with possible component of dehydration.   - Consider additional IVF bolus in PACU, but will defer to primary surgical team and anesthesia team; per primary Orthopedic team, they plan to continue IVF overnight and recheck lactate in the morning.    Persistent Atrial Fibrillation s/p Watchman (7/13/2023)  CAD s/p 2-vessel CABG 2002 (LIMA-LAD, radial-rPDA) and PCI x 2 BLANCA (2018)  Severe Aortic Stenosis s/p TAVR (2021)  SSS s/p PPM (2021)  Chronic Diastolic Heart Failure (EF 60% 4/2023)  HTN and HLD  Last echocardiogram 4/21/23;TAVR with normal valve function, left ventricular ejection fraction is visually estimated at 60%, mild dilation of the aorta is present involving the ascending aorta (maximal dimension 4.4 cm).   - Cardiology requested patient to remain on ASA 81mg (defer to primary surgical service).   - Resume Plavix per primary surgery team discretion. Of note, Cardiology wants Plavix until 1/13/24   - Continue PTA Amiodarone 200mg daily   - Continue PTA Lipitor 80mg daily   - PTA Lasix 40mg in am and 20mg in pm on hold in the immediate post-op setting, please resume in 1-2 days pending BP trends   - PTA Norvasc 10mg on hold in immediate post-op setting, please resume in 1-2 days pending BP trends.   - Monitor I&O and daily weights   - Needs outpatient follow up with Cardiology 2/2024 for post-watchman check.    Alcohol Dependence  Previously drinking on average 5-6 drinks (beer, wine, hard alcohol). He weaned himself down and completely stopped drinking in preparation for procedure. Reports his last drink was ~2 weeks ago. Has never withdrawn.   - CIWA scoring. If scoring >8.0 then will start benzodiazepines.    "- Folate, B1, multivitamin supplements   - Check K, mag and phos, replace as needed (replacement protocols ordered)    Hypothyroidism   - Continue PTA Synthroid    Gout   - Continue PTA allopurinol    Depression   - Continue PTA Amitriptyline     The patient's care was discussed with the medicine Attending Physician, Dr. Borges .    Clinically Significant Risk Factors Present on Admission                # Drug Induced Platelet Defect: home medication list includes an antiplatelet medication        # Obesity: Estimated body mass index is 38.34 kg/m  as calculated from the following:    Height as of this encounter: 1.651 m (5' 5\").    Weight as of this encounter: 104.5 kg (230 lb 6.1 oz).        # Pacemaker present       Bjorn Hairston PA-C  Hospitalist Service, GOLD TEAM   Securely message with [facility] (more info)  Text page via [facility] Paging/Directory   See signed in provider for up to date coverage information  ______________________________________________________________________    Chief Complaint   S/p posterior spinal fusion    History is obtained from the patient and EMR.    History of Present Illness   Emile Wood is a 73 year old male patient with a past medical history significant for persistent atrial fibrillation (cardioversion x 3 attempts), CAD s/p CABG 2002 and s/p PCI to proximal to mid LCx with overlapping 3.5 x 16 mm and 3.0 x 28 mm Synergy BLANCA and OM2 with 3.0 x 12 mm Synergy to OM2 on 3/3/18 in setting of STEMI, diastolic heart failure, severe aortic stenosis s/p 29 mm Hartman Jovany 3 bioprosthetic TAVR 5/26/21, and SSS s/p PPM after TAVR, bleed risk (LLE hematoma after a fall) so 7/13/23 he underwent successful left atrial appendage closure with 35 mm Watchman FLX device. Additionally, history of HTN, HLD, PVD, carotid artery stenosis (<50% stenosis R & L internal carotid arteries 2019), mild 4.2cm fusiform dilation of the ascending thoracic aorta, hypothyroidism, former smoker, gout, " alcoholic neuropathy with current alcohol dependence (4 beers per day), cocaine abuse in remission, depression, chronic opiate use, obesity, prediabetes, s/p LTKA, flatback syndrome, chronic low back pain, L2-S1 decompression (2016), TLIF L2-3 (2022) who was admitted to [facility] after undergoing posterior spinal fusion T12-S1, bilateral pelvic fixation on 12/26/23 at [place] with Dr. Zavaleta.    Patient seen in PACU on [place]. Awakens easily to voice, pain controlled. Moving all four extremities. No fevers, chest pain/pressure, shortness of breath, nausea, vomiting, abdominal pain. No new numbness or tingling. Bernal in place.    Past Medical History    Past Medical History:   Diagnosis Date    Alcohol dependence, continuous drinking behavior (H)     Alcoholic polyneuropathy (H24)     Arthritis     knee and back    CAD (coronary artery disease)     Carotid artery stenosis     Chronic diastolic CHF (congestive heart failure) (H)     Chronic sinusitis     Cocaine abuse in remission (H)     in remission since treatment in 1987    Flatback syndrome of lumbosacral region     Gout, chronic     Heart murmur     Hypertension     Irregular heart beat     hx at fib with 3 cardioversions    Numbness and tingling     peripheral neuropathy    Pacemaker     Peripheral neuropathy     Stented coronary artery     stent and CABG    Thyroid disease     Unspecified atrial fibrillation (H)        Past Surgical History   Past Surgical History:   Procedure Laterality Date    AORTIC VALVE REPLACEMENT      2021    ARTHROPLASTY KNEE  04/29/2013    Procedure: ARTHROPLASTY KNEE;  LEFT TOTAL KNEE ARTHROPLASTY ;  Surgeon: Luciano Mcgraw MD;  Location:  OR    cabg      CARDIAC SURGERY  2002    CABG    COLONOSCOPY N/A 05/02/2018    Procedure: COLONOSCOPY;;  Surgeon: John Vilchis MD;  Location:  OR    ESOPHAGOSCOPY, GASTROSCOPY, DUODENOSCOPY (EGD), COMBINED N/A 05/02/2018    Procedure: COMBINED ESOPHAGOSCOPY, GASTROSCOPY, DUODENOSCOPY  (EGD);  ESOPHAGOSCOPY, GASTROSCOPY, DUODENOSCOPY WITH BIOPSIES AND POLYPECTOMY, COLONOSCOPY WITH POLYPECTOMIES;  Surgeon: John Vilchis MD;  Location:  OR    OPTICAL TRACKING SYSTEM FUSION POSTERIOR SPINE LUMBAR N/A 10/27/2022    Procedure: Redo left L2-3 decompression with L2-3 Transforaminal Lumbar Interbody Fusion L2-3 posterior lateral instrumentation and fusion;  Surgeon: Aung Gallego MD;  Location:  OR    ORTHOPEDIC SURGERY      hallie knee, L4, c5, shoulder       Medications   Medications Prior to Admission   Medication Sig Dispense Refill Last Dose    acetaminophen (TYLENOL) 325 MG tablet Take 650 mg by mouth as needed   12/26/2023    allopurinol (ZYLOPRIM) 300 MG tablet Take 300 mg by mouth every morning   12/26/2023    amiodarone (PACERONE) 200 MG tablet Take 200 mg by mouth every morning   12/26/2023    amitriptyline (ELAVIL) 10 MG tablet Take 10 mg by mouth at bedtime   12/25/2023    amLODIPine (NORVASC) 10 MG tablet Take 1 tablet by mouth every morning   12/26/2023    amoxicillin (AMOXIL) 500 MG capsule Take 2,000 mg by mouth as needed Dental work   Unknown    aspirin (ASA) 81 MG chewable tablet Take 81 mg by mouth every morning   12/26/2023    B Complex Vitamins (VITAMIN B COMPLEX PO) Take 1 tablet by mouth every morning   Past Week    Calcium Carb-Cholecalciferol 600-800 MG-UNIT TABS Take 1 tablet by mouth daily (with lunch)   12/25/2023    clopidogrel (PLAVIX) 75 MG tablet Take 75 mg by mouth every morning   Past Week    furosemide (LASIX) 20 MG tablet Take 60 mg by mouth every morning   12/25/2023    levothyroxine (SYNTHROID/LEVOTHROID) 175 MCG tablet Take 175 mcg by mouth every morning   12/26/2023    nitroglycerin (NITROSTAT) 0.4 MG SL tablet Place 1 tablet under the tongue every 5 minutes as needed for chest pain (CALL 911 IF NOT IMPROVED AFTER THREE CONSECUTIVE DOSES). 25 tablet 0 Unknown    oxyCODONE-acetaminophen (PERCOCET)  MG per tablet Take 1 tablet by mouth every 6  hours as needed   2023    Pregabalin (LYRICA) 200 MG capsule Take 200-400 mg by mouth 2 times daily Take 2 capsules in the morning and  one capsule in the evening   2023    sildenafil (VIAGRA) 100 MG tablet Take 100 mg by mouth daily as needed (sexual activity)   2023    vitamin B-12 (CYANOCOBALAMIN) 1000 MCG tablet Take 1,000 mcg by mouth every morning   Past Week    atorvastatin (LIPITOR) 80 MG tablet Take 80 mg by mouth At Bedtime             Review of Systems    The 10 point Review of Systems is negative other than noted in the HPI or here.    Social History   I have reviewed this patient's social history and updated it with pertinent information if needed.  Social History     Tobacco Use    Smoking status: Former     Types: Cigarettes     Quit date:      Years since quittin.9     Passive exposure: Past    Smokeless tobacco: Former    Tobacco comments:     smoke cigars   Vaping Use    Vaping Use: Never used   Substance Use Topics    Alcohol use: Yes     Alcohol/week: 35.0 - 42.0 standard drinks of alcohol     Types: 35 - 42 Standard drinks or equivalent per week     Comment: daily- 4 beers, sometimes glass of wine, shot of whiskey    Drug use: No         Family History   I have reviewed this patient's family history and updated it with pertinent information if needed.  Family History   Problem Relation Age of Onset    Cerebrovascular Disease Mother     Cerebrovascular Disease Father     Lung Cancer Father     Heart Disease Brother     Anesthesia Reaction No family hx of     Thrombosis No family hx of          Allergies   No Known Allergies     Physical Exam   Vital Signs: Temp: 97.3  F (36.3  C) Temp src: Oral BP: 122/73 Pulse: 84   Resp: 20 SpO2: 95 % O2 Device: Simple face mask Oxygen Delivery: 6 LPM  Weight: 230 lbs 6.09 oz    GENERAL: Alert and oriented x 3. Sleepy in PACU. Well nourished, well developed.  No acute distress.    HEENT: Normocephalic, atraumatic. Anicteric sclera.  Mucous membranes moist.   CV: RRR. S1, S2. No murmurs appreciated.   RESPIRATORY: Effort normal on 6L O2 in PACU post-op. Lungs CTAB with no wheezing, rales, or rhonchi.   GI: Abdomen soft and non distended, bowel sounds present x all 4 quadrants. No tenderness, rebound, or guarding.   NEUROLOGICAL: No focal deficits. Follows commands.  Strength 5/5 in upper and lower extremities.   MUSCULOSKELETAL: No joint swelling or tenderness. Moves all extremities. Surgical dressing CDI, pedal pulses 2+ bilaterally, CMS intact bilateral lower extremities.   EXTREMITIES: No gross deformities. No peripheral edema.   SKIN: Grossly warm, dry, and intact. No jaundice. No rashes.     Medical Decision Making       75 MINUTES SPENT BY ME on the date of service doing chart review, history, exam, documentation & further activities per the note.      Data   Imaging results reviewed over the past 24 hrs:   Recent Results (from the past 24 hour(s))   Cardiac Device Check - Inpatient   Result Value    Date Time Interrogation Session [id number]    Implantable Pulse Generator  Medtronic    Implantable Pulse Generator Model [id number] [place] XT  MRI    Implantable Pulse Generator Serial Number [id number]    Type Interrogation Session In Clinic    [facility]    Implantable Pulse Generator Type Pacemaker    Implantable Pulse Generator Implant Date 20210506    Implantable Lead  Medtronic    Implantable Lead Model 5076 CapSureFix Novus MRI SureScan    Implantable Lead Serial Number [id number]    Implantable Lead Implant Date 20210506    Implantable Lead Polarity Type Bipolar Lead    Implantable Lead Location Detail 1 UNKNOWN    Implantable Lead Special Function 45 CM    Implantable Lead Location Right Atrium    Implantable Lead Connection Status Connected    Implantable Lead  Medtronic    Implantable Lead Model 5076 CapSureFix Novus MRI SureScan    Implantable Lead Serial  Number [id number]    Implantable Lead Implant Date 20210506    Implantable Lead Polarity Type Bipolar Lead    Implantable Lead Location Detail 1 UNKNOWN    Implantable Lead Special Function 58 CM    Implantable Lead Location Right Ventricle    Implantable Lead Connection Status Connected    Srinivasa Setting Mode (NBG Code) DOO    Srinivasa Setting Lower Rate Limit 85    Srinivasa Setting Hysterisis Rate DISABLED    Srinivasa Setting PAV Delay Low 180    Lead Channel Setting Pacing Polarity Bipolar    Lead Channel Setting Pacing Anode Location Right Atrium    Lead Channel Setting Pacing Anode Terminal Ring    Lead Channel Setting Sensing Cathode Location Right Atrium    Lead Channel Setting Sensing Cathode Terminal Tip    Lead Channel Setting Pacing Pulse Width 0.4    Lead Channel Setting Pacing Amplitude 2    Lead Channel Setting Pacing Polarity Bipolar    Lead Channel Setting Pacing Anode Location Right Ventricle    Lead Channel Setting Pacing Anode Terminal Ring    Lead Channel Setting Sensing Cathode Location Right Ventricle    Lead Channel Setting Sensing Cathode Terminal Tip    Lead Channel Setting Pacing Pulse Width 0.4    Lead Channel Setting Pacing Amplitude 2    Zone Setting Type Category VF    Zone Setting Vendor Type Category V High Rate    Zone Setting Type Category VT    Zone Setting Vendor Type Category FastVT    Zone Setting Type Category VT    Zone Setting Vendor Type Category VT    Zone Setting Type Category VT    Zone Setting Vendor Type Category MonVT    Zone Setting Status Monitor    Zone Setting Detection Interval 400    Zone Setting Type Category ATRIAL_FIBRILLATION    Zone Setting Vendor Type Category FastATAF    Zone Setting Type Category AT/AF    Zone Setting Status Active    Zone Setting Detection Interval 350    Lead Channel Impedance Value 475    Lead Channel Impedance Value 342    Lead Channel Sensing Intrinsic Amplitude 1.9    Lead Channel Sensing Intrinsic Amplitude 2.375    Lead Channel Pacing  Threshold Amplitude 1.25    Lead Channel Pacing Threshold Pulse Width 0.4    Lead Channel Impedance Value 513    Lead Channel Impedance Value 475    Lead Channel Sensing Intrinsic Amplitude 13    Lead Channel Sensing Intrinsic Amplitude 13.75    Lead Channel Pacing Threshold Amplitude 0.75    Lead Channel Pacing Threshold Pulse Width 0.4    Battery Date Time of Measurements [id number]    Battery Status Middle of Service    Battery RRT Trigger 2.625    Battery Remaining Longevity 124    Battery Voltage 3.02    Srinivasa Statistic Date Time Start 90705502302556    Srinivasa Statistic Date Time End 54232562720268    Srinivasa Statistic RA Percent Paced 97.62    Srinivasa Statistic RV Percent Paced 0.69    Srinivasa Statistic AP  Percent 0.67    Srinivasa Statistic AS  Percent 0.01    Srinivasa Statistic AP VS Percent 97    Srinivasa Statistic AS VS Percent 2.32    Atrial Tachy Statistic Date Time Start 55546542836326    Atrial Tachy Statistic Date Time End 12410709495208    Atrial Tachy Statistic AT/AF [place] Percent 0    Therapy Statistic Recent Date Time Start 88662516592198    Therapy Statistic Recent Date Time End 00928095765291    Therapy Statistic Total  Date Time Start 15329182612093    Therapy Statistic Total  Date Time End 62759889499181    Episode Statistic Recent Count 0    Episode Statistic Type Category AT/AF    Episode Statistic Recent Count 0    Episode Statistic Type Category Patient Activated    Episode Statistic Recent Count 0    Episode Statistic Type Category SVT    Episode Statistic Recent Count 0    Episode Statistic Type Category VT    Episode Statistic Recent Count 0    Episode Statistic Type Category VT    Episode Statistic Recent Date Time Start 75039835280903    Episode Statistic Recent Date Time End 03720397643973    Episode Statistic Recent Date Time Start 25435608778503    Episode Statistic Recent Date Time End 96837579164276    Episode Statistic Recent Date Time Start 36966893687735    Episode Statistic Recent  Date Time End 51167610731136    Episode Statistic Recent Date Time Start 22817573713056    Episode Statistic Recent Date Time End 91421844687439    Episode Statistic Recent Date Time Start 54505703738972    Episode Statistic Recent Date Time End 55392930939653    Episode Statistic Total Count 0    Episode Statistic Type Category AT/AF    Episode Statistic Total Count 0    Episode Statistic Type Category Patient Activated    Episode Statistic Total Count 0    Episode Statistic Type Category SVT    Episode Statistic Total Count 1    Episode Statistic Type Category VT    Episode Statistic Total Count 0    Episode Statistic Type Category VT    Episode Statistic Total Date Time Start 20210526152548    Episode Statistic Total Date Time End 90572865411323    Episode Statistic Total Date Time Start 08707075850786    Episode Statistic Total Date Time End 50045185150633    Episode Statistic Total Date Time Start 20210526152548    Episode Statistic Total Date Time End 20231226074851    Episode Statistic Total Date Time Start 20210526152548    Episode Statistic Total Date Time End 83614918359219    Episode Statistic Total Date Time Start 20210526152548    Episode Statistic Total Date Time End 41016283807891    Narrative    Patient seen in 3A Pre OR (spinal surgery) for evaluation and iterative programming of their pacemaker per MD orders.    Device: Medtronic W1DR01 [place] XT DR MRI  Normal device function  Mode: AAIR<>DDDR  bpm  AP: 97.6%  : 0.7%  Intrinsic Rhythm: SB 55 bpm  Lead Trends Appear Stable.  Estimated battery longevity to RRT = 10.6 years.  Atrial Arrhythmia: None.  AF [place] = 0%  Anticoagulant: None.  Ventricular Arrhythmia: None.  Setting Changes: Programmed AAIR<>DDDR  bpm to DOO 85 bpm.      BAO Nieves RN    Dual lead pacemaker    I have reviewed and interpreted the device interrogation, settings, programming and nurse's summary. The device is functioning within normal device parameters. I  agree with the current findings, assessment and plan.   XR Surgery MARIAMA L/T 5 Min Fluoro w Stills    Narrative    This exam was marked as non-reportable because it will not be read by a   radiologist or a [facility] non-radiologist provider.           Recent Labs   Lab 12/26/23  1540 12/26/23  1343 12/26/23  1251 12/26/23  1112 12/26/23  1112 12/26/23  0642   WBC  --   --   --   --   --  7.4   HGB  --  13.8 13.7  --  13.6 14.7   MCV  --   --   --   --   --  92   PLT  --   --   --   --   --  259   INR  --   --   --   --   --  1.05   NA  --  139 139  --  141 141   POTASSIUM  --  4.2 4.3  --  4.3 4.0   CHLORIDE  --   --   --   --   --  103   CO2  --   --   --   --   --  25   BUN  --   --   --   --   --  20.6   CR  --   --   --   --   --  0.76   ANIONGAP  --   --   --   --   --  13   CATE  --   --   --   --   --  9.4   * 205* 205*   < > 161* 117*    < > = values in this interval not displayed.

## 2023-12-26 NOTE — ANESTHESIA PROCEDURE NOTES
Arterial Line Procedure Note    Pre-Procedure   Staff -        Anesthesiologist:  Bettina Gore MD       Performed By: anesthesiologist       Location: OR       Pre-Anesthestic Checklist: patient identified, IV checked, risks and benefits discussed, informed consent, monitors and equipment checked, pre-op evaluation and at physician/surgeon's request  Timeout:       Correct Patient: Yes        Correct Procedure: Yes        Correct Site: Yes        Correct Position: Yes   Line Placement:   This line was placed Post Induction  Procedure   Procedure: arterial line       Laterality: right       Insertion Site: radial.  Sterile Prep        Standard elements of sterile barrier followed       Skin prep: Chloraprep  Insertion/Injection        Technique: ultrasound guided and Seldinger Technique        1. Ultrasound was used to evaluate the access site.       2. Artery evaluated via ultrasound for patency/adequacy.       3. Using real-time ultrasound the needle/catheter was observed entering the artery/vein.       Catheter Type/Size: 20 G, 1.75 in/4.5 cm quick cath (integral wire)  Narrative        Tegaderm dressing used.       Complications: None apparent,        Arterial waveform: Yes        IBP within 10% of NIBP: Yes

## 2023-12-26 NOTE — LETTER
Recipient: [facility]          Sender: Patricia Elliott [facility] at [facility] [phone]          Date: January 3, 2024  Patient Name:  Emile Wood  Patient YOB: 1950  Routing Message: Attached are updated discharge orders. Please call with any questions.         The documents accompanying this notice contain confidential information belonging to the sender.  This information is intended only for the use of the individual or entity named above.  The authorized recipient of this information is prohibited from disclosing this information to any other party and is required to destroy the information after its stated need has been fulfilled, unless otherwise required by state law.    If you are not the intended recipient, you are hereby notified that any disclosure, copy, distribution or action taken in reliance on the contents of these documents is strictly prohibited.  If you have received this document in error, please return it by fax to [phone] with a note on the cover sheet explaining why you are returning it (e.g. not your patient, not your provider, etc.).  If you need further assistance, please call .  Documents may also be returned by mail to [employer], , [address], [place], [place] [address].

## 2023-12-26 NOTE — INTERVAL H&P NOTE
"I have reviewed the surgical (or preoperative) H&P that is linked to this encounter, and examined the patient. Noted changes include: Given high risk of extensive deformity correction with multilevel SPO and TLIF, I plan to minimize the number of interbody fusions as this adds approximately 45 minutes per level in my  hands.  I plan to perform facetectomy and decompression at L4-5 and L5-S1 to decompress the neural elements and allow segmental deformtiy correction  and stabilizing the spine with instrumented fusion L1-pelvis. Amanda discussed this at length with the patient and his wife.     Clinical Conditions Present on Arrival:  Clinically Significant Risk Factors Present on Admission                 # Drug Induced Platelet Defect: home medication list includes an antiplatelet medication  # Obesity: Estimated body mass index is 38.34 kg/m  as calculated from the following:    Height as of this encounter: 1.651 m (5' 5\").    Weight as of this encounter: 104.5 kg (230 lb 6.1 oz).       "

## 2023-12-26 NOTE — OP NOTE
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]  Lumbar adjacent segment disease with spondylolisthesis [M51.36, M43.16]  Chronic diastolic CHF (congestive heart failure) (H) [I50.32]  Atrial fibrillation, unspecified type (H) [I48.91]    Procedure(s) Performed:   Exploration of fusion mass L2-3  Reinstrumentation L2-3  Segmental instrumentation T12-L1, L4-S1  Bilateral pelvic fixation with 22 modifier for stacked S2 AIS screws on patient's right  Fowler-Plummer osteotomy L4-5 and L5-S1  Posterior spinal fusion T12-S1  Use of morselized local autograft bone  Use of allograft bone with bone morphogenic protein synthetic graft extender    Staff: Dr. Nacho Zavaleta MD  Resident/Fellow: Ruel Fofana MD PGY4      Implants:   At L2-3 pre-existing globus hydroxyapatite pedicle screws were left in place as it could not be removed  T12-L1, L4-S1 Medtronic Solera 5.5/6.0 pedicle screw system  Pelvic fixation 10.5 x 100 mm SI bone Granite implants x 3  Medtronic 5.5 mm cobalt chrome rods x 3  Drains: 10 Turkish BECKY channel drain deep to fascia  Intra-op Labs/Cxs/Specimens:   ID Type Source Tests Collected by Time Destination   A : Left L3 Tissue Spine, Lumbar ANAEROBIC BACTERIAL CULTURE ROUTINE, GRAM STAIN, FUNGAL OR YEAST CULTURE ROUTINE, AEROBIC BACTERIAL CULTURE ROUTINE Nacho Zavaleta MD 2023  9:36 AM    B : Right L2, 3, fusion mass Tissue Spine, Lumbar  ANAEROBIC BACTERIAL CULTURE ROUTINE, GRAM STAIN, FUNGAL OR YEAST CULTURE ROUTINE, AEROBIC BACTERIAL CULTURE ROUTINE Nacho Zavaleta MD 12/26/2023  9:43 AM    C : L2-3 fusion mass Tissue Other ANAEROBIC BACTERIAL CULTURE ROUTINE, GRAM STAIN, FUNGAL OR YEAST CULTURE ROUTINE, AEROBIC BACTERIAL CULTURE ROUTINE Nacho Zavaleta MD 12/26/2023  9:57 AM        Indication for Procedure:   Patient is a 73 year old male multiple previous decompressive surgeries as well as L2-3 interbody fusion with posterior instrumented fusion presenting with flatback deformity, persistent lumbar stenosis, lumbar radiculopathy and low back pain.  He has had extensive nonoperative management without adequate symptomatic relief. Patient remains severely disabled due to these symptoms. After appropriate discussion of risks, alternatives and benefits of surgery the patient elected to proceed with surgery.  We discussed the risk of cardiac, pulmonary, and embolic complications which can be life threatening. We also discussed risk of incomplete symptom relief, injury to the nerve roots or spinal cord which can result in permanent sensory or motor function loss, dural injury with spinal fluid leak which can result in headaches and require lumbar drain or additional surgery to address, nonunion or hardware failure which can result in persistent pain and require additional surgery to address.  Because of his substantial cardiac comorbidities I counseled the patient that I do not plan to do multilevel transforaminal lumbar interbody fusions as we greatly extend the procedure and markedly increase the surgical blood loss.  Patient was agreeable with this plan.  No guarantees were given. Patient voluntarily signed written informed consent.       Description of Procedure:   On the day of surgery I met the patient in the preoperative holding area.  We reviewed the surgical plan. I answered the patient's questions to the best of my ability. Site  was marked and consent forms were signed by the patient and I.  OR and Anesthesia team were briefed. Patient was taken to the OR and general anesthesia administered with endotracheal intubation. IV antibiotics and tranexamic acid were administered prior to incision. Patient was positioned in the prone position.  The surgical site was squared off with thousand drapes and prewashed with alcohol followed by ChloraPrep stick.  C arm then was brought into the field and using a mixture of 30 mL 0.5% bupivacaine with dilute epi and 4 mg Decadron I performed erector spinae fascial plane block injecting 7.5 mL of the mixture at the bilateral L2 and L5 transverse processes.  Surgical site was then prepped and draped in sterile fashion. Timeout was performed.    Incision was made from the level of T12 pedicles down to the S2 level.  Exposure carried down to the posterior elements using Bovie.  There is a dense scar tissue given his multiple prior surgeries.  The L2-3 instrumentation was exposed and removed.  I was able to easily distract across the L2-3 segment confirming pseudoarthrosis of that level.  I took 3 cultures from the incompletely healed fusion mass and sent these for cultures.  Of note these were hydroxyapatite coated screws I attempted to remove the hand  however the screwdriver tip broke off and the pedicle screw tulip.  I elected to leave all 4 screws in place and connect them in with our new instrumentation.  At this point use a magnum curette to debride the dense scar tissue off of the residual facet joints from L2 down to S1.  We then attached the spinous process clamp for the stereotactic navigation fiducial to the S1 spinous process.  The C arm was draped and brought into the field.  3D CT scan was obtained and sent to the [employer] station for navigated instrumentation.  After confirming the accuracy we proceeded with pedicle screw instrumentation.    Beginning at the T12 level we used a navigated awl  to confirm her anatomic start point after which the awl was malleted down through the pedicle into the vertebral body.  We then used a navigated tap to tap to 1 mm below nominal screw diameter.  Screws were then inserted using navigated screwdriver.  In this way we also placed pedicle screws at the L1 level.  After this we performed a check spin showing that the T12 and L1 screws were bilaterally intraosseous position without any breaches.  We then moved our array and obtained a CT scan which included the S2 AIS trajectories as well as S1, L5 and L4.  We inserted with pelvic fixation.  Using navigated technique we can use navigated awl to confirm anatomic start point.  The awl was then malleted down to the SI joint.  At which point we changed to a 90 mm navigated 3.5 mm drill.  After drilling our pelvic fixation trajectory we took an 8.5 mm tap and then a 10.5 mm tap and ultimately tapping the S2 AIS path to nominal diameter.  On the right side but the caudal implant is close to the sciatic buttresses I could and was able to place a stacked screw more cephalad within the S2 AIS pathway starting at just lateral to the dorsal foramen.  On the left side we placed 1 S2 AI screw similar navigated fashion.  All 3 of these were 10.5 x 100 mm SI bone Granite implants.  We then placed bilateral S1 7.5 x 50 mm Solera screws, at L5 and L4 we placed bilateral 7.5 x 50.  Check spin was obtained confirming all the screws were positioned intraosseously.    At this point we moved onto Smith-Plummer osteotomies at the L4-5 and L5-S1 segments.  These were necessary to mobilize the vertebrae for deformity correction as well as decompression of residual severe persistent lateral recess and foraminal stenosis largely due to the very hypertrophied facet joints but also underlying disc herniation.  I felt that performing complete facetectomy via Smith-Plummer osteotomy was the most efficient and effective way to correct deformity and  decompress the exiting nerve roots as well as the traversing nerve roots at these levels.  Facet joints were markedly spondylotic and overgrown making identifying the facet joint more difficult.  I used a TLIF osteotome to perform an inferior facetectomy of the residual L4 and L5 inferior facets.  This exposed the underlying residual ligamentum deep to the facet joint as well as the hypertrophic superior articular process of the L5 and S1 vertebral bodies respectively.  After developing of the plane between the dura and scar tissue/residual ligamentum flavum and I used a curved microcurette to dissect out the lateral recess.  While dissecting away the scar tissue at the L4 level I had a small bleb which we exposed and then I reinforced with a 6-0 Nadeau-Caleb suture in figure-of-eight fashion for a watertight seal.  Then continue with the decompression exposing the lateral recess.  Then using osteotome to resect the superior articular process of L5 and S1 completing the Smith-Plummer osteotomy at these levels.  There was extensive osteophytic bone lateral to the facet joint which continue to cause far lateral stenosis.  I used a Kerrison rongeur to resect this overlying bone.  At this point I was able to take a [place] very easily passed out over the entire exiting nerve root of L4 and L5 confirming both visually and with palpation decompression of the nerve roots.    At this point we took the patient specific rods and measured and cut them to length for the T12-S2 instrumentation.  The right-sided candida was then placed from the more caudal and medial S2 AI screw up through our pedicle screws.  It was secured provisionally with set caps.  On the patient's left side I do some coronal bending but the unit candida was also secured provisionally and contoured to lay in without any retention on the construct and secured in place.  Of note at L2 and L3 we had to use the previous globus set caps as we could not remove the HA-coated  screws.  We then measured cut and bent an accessory candida up for the right side which connected to the more lateral and superior S2 AIS screw, the L5 screw and then connected proximal to the candida via a open close side-to-side connector.  We obtained radiographs which showed there was right mendoza coronal deformity.  We used L benders to correct the coronal deformity and also compressed on the left T12-L2 segments and distracted on the right T12 to L2 segments.  Set caps were then torqued off per  specifications.      We used a high-speed bur to decorticate the posterior elements from T12 down to S1.  We placed the large bone morphogenic protein strips throughout the fusion bed from T12 down to S1.  We then placed the morselized local autograft bone from our facetectomies into the posterior fusion bed followed by crushed cancellous allograft bone which had been mixed with magnetos synthetic graft extender to promote bony fusion from T12 down to S1.  This completed the posterior spinal fusion of T12-S1.  Wound was thoroughly irrigated with sterile saline throughout the case. Hemostasis was achieved with bipolar electrocautery and surgiflo/cottonoids.  A 10 Eritrean BECKY channel drain was placed deep to fascia. 1 g vancomycin powder was placed in the wound. The fascia was closed in watertight fashion using #1 vicryl. Subcutaneous tissue and dermis were closed with 2-0 vicryl. Skin was closed with running subcuticular monocryl. A Prineo dressing was applied. Sterile Aquacel bandage was applied. Drapes were removed and patient transferred to the hospital cart. Patient emerged from general anesthesia and was extubated. They were taken to the PACU in stable condition.     All sponge and needle counts were correct x 2.  I was present and scrubbed for the entire procedure.     Post op plan:   Ortho Spine Primary team  Activity: Up with assist until independent. Avoid repetitive lifting, bending, twisting  Weight bearing  status: No lifting greater than 10 lbs. .  Pain management: Oral pain medications with IV for breakthrough. Wean IV as able.    Antibiotics: Ancef x 24 hours; if drain in place continue IV antibiotics until drains out  Diet: Begin with clear fluids and progress diet as tolerated. Zachery nutritional supplement twice daily  DVT prophylaxis: Mechanical prophylaxis with SCD  Imaging: Standing radiographs when able  Labs: Monitor Hgb   Bracing/Splinting: None.   Dressings: Keep clean, dry and intact. Patient may shower with bandage in place. Dressing to be removed 2 weeks after surgery. Please change or reinforce as necessary for strikethrough or saturation.   Drains: Medium hemovac drain to be removed when output < 30 ml per shift  Physical Therapy/Occupational Therapy: Eval and treat.  Consults: IM, SW.  Follow-up: Clinic 6 weeks  Disposition: Admitted for physical therapy and pain control    Nacho Zavaleta MD  Orthopedic Spine Surgeon  , Department of Orthopedic Surgery

## 2023-12-26 NOTE — ANESTHESIA CARE TRANSFER NOTE
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    Anesthesia Type:   General     Note:    Oropharynx: oropharynx clear of all foreign objects  Level of Consciousness: awake  Oxygen Supplementation: face mask  Level of Supplemental Oxygen (L/min / FiO2): 6  Independent Airway: airway patency satisfactory and stable  Dentition: dentition unchanged  Vital Signs Stable: post-procedure vital signs reviewed and stable  Report to RN Given: handoff report given  Patient transferred to: PACU  Comments: Ketamine gtt continued in PACU for postop pain and insulin gtt continued for BG management.  Handoff Report: Identifed the Patient, Identified the Reponsible Provider, Reviewed the pertinent medical history, Discussed the surgical course, Reviewed Intra-OP anesthesia mangement and issues during anesthesia, Set expectations for post-procedure period and Allowed opportunity for questions and acknowledgement of understanding      Vitals:  Vitals Value Taken Time   /66 12/26/23 1445   Temp     Pulse 84 12/26/23 1448   Resp 18 12/26/23 1448   SpO2 94 % 12/26/23 1448   Vitals shown include unfiled device data.    Electronically Signed By: KARLA Milligan CRNA  December 26, 2023  2:49 PM

## 2023-12-26 NOTE — LETTER
Recipient: [facility]          Sender: XOCHILT Dawson at [facility] [phone]          Date: January 3, 2024  Patient Name:  Emile Wood  Patient YOB: 1950  Routing Message: Attached are discharge orders. PAS completed- [id number]. Ride set up for the service window of 3:30-4:15pm. Call with any questions or concerns. I will separately fax any scripts. Thakn you         The documents accompanying this notice contain confidential information belonging to the sender.  This information is intended only for the use of the individual or entity named above.  The authorized recipient of this information is prohibited from disclosing this information to any other party and is required to destroy the information after its stated need has been fulfilled, unless otherwise required by state law.    If you are not the intended recipient, you are hereby notified that any disclosure, copy, distribution or action taken in reliance on the contents of these documents is strictly prohibited.  If you have received this document in error, please return it by fax to [phone] with a note on the cover sheet explaining why you are returning it (e.g. not your patient, not your provider, etc.).  If you need further assistance, please call .  Documents may also be returned by mail to [employer], , [address], [place], [place] [address].

## 2023-12-26 NOTE — ANESTHESIA POSTPROCEDURE EVALUATION
Patient: Emile Wood    Procedure: Procedure(s):  O-arm/Stealth assisted reinstrumentation Lumbar 2-3, posterior spinal fusion Thoracic 12-Sacral 1, bilateral pelvic fixation ([place]), Fowler-Plummer Osteotomy Lumbar 4-5, Sacral 1, use of morselized local autograft, allograft bone and Bone Morphogenetic Protein       Anesthesia Type:  General    Note:  Disposition: Inpatient; Admission   Postop Pain Control: Uneventful            Sign Out: Pain at Preoperative BASELINE   PONV: No   Neuro/Psych: Uneventful            Sign Out: Acceptable/Baseline neuro status   Airway/Respiratory: Uneventful            Sign Out: Acceptable/Baseline resp. status   CV/Hemodynamics: Uneventful            Sign Out: Acceptable CV status; No obvious hypovolemia; No obvious fluid overload   Other NRE: NONE   DID A NON-ROUTINE EVENT OCCUR? No    Event details/Postop Comments:  Discharging to [facility] to be followed by Ortho Spine team.           Last vitals:  Vitals Value Taken Time   /59 12/26/23 1700   Temp 36.4  C (97.5  F) 12/26/23 1615   Pulse 66 12/26/23 1729   Resp 14 12/26/23 1700   SpO2 97 % 12/26/23 1729   Vitals shown include unfiled device data.    Electronically Signed By: Willie Hernandez MD  December 26, 2023  5:30 PM

## 2023-12-26 NOTE — ANESTHESIA PROCEDURE NOTES
Airway       Patient location during procedure: OR       Procedure Start/Stop Times: 12/26/2023 8:28 AM  Staff -        CRNA: Warren Mcbride APRN CRNA       Performed By: CRNA  Consent for Airway        Urgency: elective  Indications and Patient Condition       Indications for airway management: brenda-procedural       Induction type:intravenous       Mask difficulty assessment: 1 - vent by mask    Final Airway Details       Final airway type: endotracheal airway       Successful airway: ETT - single and Oral  Endotracheal Airway Details        ETT size (mm): 7.5       Cuffed: yes       Successful intubation technique: video laryngoscopy       VL Blade Size: MAC 3       Grade View of Cords: 1       Adjucts: stylet       Position: Right       Measured from: lips       Secured at (cm): 23       Bite block used: Soft    Post intubation assessment        Placement verified by: capnometry and equal breath sounds        Number of attempts at approach: 1       Number of other approaches attempted: 0       Secured with: tape       Ease of procedure: easy       Dentition: Intact    Medication(s) Administered   Medication Administration Time: 12/26/2023 8:28 AM

## 2023-12-27 ENCOUNTER — APPOINTMENT (OUTPATIENT)
Dept: PHYSICAL THERAPY | Facility: CLINIC | Age: 73
DRG: 456 | End: 2023-12-27
Payer: MEDICARE

## 2023-12-27 LAB
ANION GAP SERPL CALCULATED.3IONS-SCNC: 8 MMOL/L (ref 7–15)
BUN SERPL-MCNC: 12.6 MG/DL (ref 8–23)
CALCIUM SERPL-MCNC: 7.9 MG/DL (ref 8.8–10.2)
CHLORIDE SERPL-SCNC: 108 MMOL/L (ref 98–107)
CREAT SERPL-MCNC: 0.63 MG/DL (ref 0.67–1.17)
DEPRECATED HCO3 PLAS-SCNC: 26 MMOL/L (ref 22–29)
EGFRCR SERPLBLD CKD-EPI 2021: >90 ML/MIN/1.73M2
GLUCOSE BLDC GLUCOMTR-MCNC: 135 MG/DL (ref 70–99)
GLUCOSE SERPL-MCNC: 141 MG/DL (ref 70–99)
HGB BLD-MCNC: 10.8 G/DL (ref 13.3–17.7)
LACTATE SERPL-SCNC: 1.5 MMOL/L (ref 0.7–2)
MAGNESIUM SERPL-MCNC: 1.8 MG/DL (ref 1.7–2.3)
PHOSPHATE SERPL-MCNC: 3.2 MG/DL (ref 2.5–4.5)
POTASSIUM SERPL-SCNC: 4.3 MMOL/L (ref 3.4–5.3)
SODIUM SERPL-SCNC: 142 MMOL/L (ref 135–145)

## 2023-12-27 PROCEDURE — 250N000013 HC RX MED GY IP 250 OP 250 PS 637: Performed by: STUDENT IN AN ORGANIZED HEALTH CARE EDUCATION/TRAINING PROGRAM

## 2023-12-27 PROCEDURE — 99207 PR NO CHARGE LOS: CPT | Performed by: NURSE PRACTITIONER

## 2023-12-27 PROCEDURE — 36415 COLL VENOUS BLD VENIPUNCTURE: CPT | Performed by: PHYSICIAN ASSISTANT

## 2023-12-27 PROCEDURE — 99232 SBSQ HOSP IP/OBS MODERATE 35: CPT | Performed by: INTERNAL MEDICINE

## 2023-12-27 PROCEDURE — 250N000009 HC RX 250

## 2023-12-27 PROCEDURE — 80048 BASIC METABOLIC PNL TOTAL CA: CPT | Performed by: PHYSICIAN ASSISTANT

## 2023-12-27 PROCEDURE — 83735 ASSAY OF MAGNESIUM: CPT | Performed by: ORTHOPAEDIC SURGERY

## 2023-12-27 PROCEDURE — 85018 HEMOGLOBIN: CPT

## 2023-12-27 PROCEDURE — 250N000013 HC RX MED GY IP 250 OP 250 PS 637

## 2023-12-27 PROCEDURE — 97530 THERAPEUTIC ACTIVITIES: CPT | Mod: GP

## 2023-12-27 PROCEDURE — 84100 ASSAY OF PHOSPHORUS: CPT | Performed by: ORTHOPAEDIC SURGERY

## 2023-12-27 PROCEDURE — 250N000011 HC RX IP 250 OP 636

## 2023-12-27 PROCEDURE — 83605 ASSAY OF LACTIC ACID: CPT | Performed by: PHYSICIAN ASSISTANT

## 2023-12-27 PROCEDURE — 258N000003 HC RX IP 258 OP 636

## 2023-12-27 PROCEDURE — 120N000002 HC R&B MED SURG/OB UMMC

## 2023-12-27 PROCEDURE — 97161 PT EVAL LOW COMPLEX 20 MIN: CPT | Mod: GP

## 2023-12-27 PROCEDURE — 250N000013 HC RX MED GY IP 250 OP 250 PS 637: Performed by: PHYSICIAN ASSISTANT

## 2023-12-27 RX ORDER — ATORVASTATIN CALCIUM 80 MG/1
80 TABLET, FILM COATED ORAL AT BEDTIME
Status: DISCONTINUED | OUTPATIENT
Start: 2023-12-27 | End: 2024-01-03 | Stop reason: HOSPADM

## 2023-12-27 RX ORDER — CLOPIDOGREL BISULFATE 75 MG/1
75 TABLET ORAL DAILY
Status: DISCONTINUED | OUTPATIENT
Start: 2023-12-28 | End: 2024-01-03 | Stop reason: HOSPADM

## 2023-12-27 RX ORDER — ATORVASTATIN CALCIUM 80 MG/1
80 TABLET, FILM COATED ORAL EVERY EVENING
Status: DISCONTINUED | OUTPATIENT
Start: 2023-12-27 | End: 2023-12-27

## 2023-12-27 RX ORDER — ASPIRIN 81 MG/1
81 TABLET, CHEWABLE ORAL DAILY
Status: DISCONTINUED | OUTPATIENT
Start: 2023-12-27 | End: 2024-01-03 | Stop reason: HOSPADM

## 2023-12-27 RX ORDER — ATORVASTATIN CALCIUM 80 MG/1
80 TABLET, FILM COATED ORAL AT BEDTIME
COMMUNITY

## 2023-12-27 RX ADMIN — SENNOSIDES AND DOCUSATE SODIUM 1 TABLET: 50; 8.6 TABLET ORAL at 19:53

## 2023-12-27 RX ADMIN — CEFAZOLIN SODIUM 2 G: 2 INJECTION, SOLUTION INTRAVENOUS at 14:55

## 2023-12-27 RX ADMIN — HYDROXYZINE HYDROCHLORIDE 10 MG: 10 TABLET ORAL at 12:21

## 2023-12-27 RX ADMIN — ATORVASTATIN CALCIUM 80 MG: 80 TABLET, FILM COATED ORAL at 21:23

## 2023-12-27 RX ADMIN — AMIODARONE HYDROCHLORIDE 200 MG: 200 TABLET ORAL at 09:45

## 2023-12-27 RX ADMIN — ASPIRIN 81 MG CHEWABLE TABLET 81 MG: 81 TABLET CHEWABLE at 11:19

## 2023-12-27 RX ADMIN — KETAMINE HYDROCHLORIDE 5 MG/HR: 100 INJECTION, SOLUTION, CONCENTRATE INTRAMUSCULAR; INTRAVENOUS at 03:27

## 2023-12-27 RX ADMIN — SENNOSIDES AND DOCUSATE SODIUM 1 TABLET: 50; 8.6 TABLET ORAL at 09:45

## 2023-12-27 RX ADMIN — CEFAZOLIN SODIUM 2 G: 2 INJECTION, SOLUTION INTRAVENOUS at 05:23

## 2023-12-27 RX ADMIN — METHOCARBAMOL 500 MG: 500 TABLET ORAL at 09:49

## 2023-12-27 RX ADMIN — ALLOPURINOL 300 MG: 300 TABLET ORAL at 09:45

## 2023-12-27 RX ADMIN — CEFAZOLIN SODIUM 2 G: 2 INJECTION, SOLUTION INTRAVENOUS at 21:15

## 2023-12-27 RX ADMIN — ACETAMINOPHEN 975 MG: 325 TABLET, FILM COATED ORAL at 14:55

## 2023-12-27 RX ADMIN — AMITRIPTYLINE HYDROCHLORIDE 10 MG: 10 TABLET, FILM COATED ORAL at 21:23

## 2023-12-27 RX ADMIN — Medication 1 TABLET: at 09:45

## 2023-12-27 RX ADMIN — HYDROMORPHONE HYDROCHLORIDE 0.4 MG: 0.2 INJECTION, SOLUTION INTRAMUSCULAR; INTRAVENOUS; SUBCUTANEOUS at 01:00

## 2023-12-27 RX ADMIN — THIAMINE HCL TAB 100 MG 100 MG: 100 TAB at 09:45

## 2023-12-27 RX ADMIN — FAMOTIDINE 20 MG: 20 TABLET ORAL at 09:45

## 2023-12-27 RX ADMIN — OXYCODONE HYDROCHLORIDE 10 MG: 10 TABLET ORAL at 11:19

## 2023-12-27 RX ADMIN — POLYETHYLENE GLYCOL 3350 17 G: 17 POWDER, FOR SOLUTION ORAL at 09:49

## 2023-12-27 RX ADMIN — HYDROXYZINE HYDROCHLORIDE 10 MG: 10 TABLET ORAL at 19:54

## 2023-12-27 RX ADMIN — FAMOTIDINE 20 MG: 20 TABLET ORAL at 19:53

## 2023-12-27 RX ADMIN — HYDROMORPHONE HYDROCHLORIDE 0.4 MG: 0.2 INJECTION, SOLUTION INTRAMUSCULAR; INTRAVENOUS; SUBCUTANEOUS at 17:38

## 2023-12-27 RX ADMIN — OXYCODONE HYDROCHLORIDE 10 MG: 10 TABLET ORAL at 01:45

## 2023-12-27 RX ADMIN — PREGABALIN 200 MG: 100 CAPSULE ORAL at 19:53

## 2023-12-27 RX ADMIN — LEVOTHYROXINE SODIUM 175 MCG: 175 TABLET ORAL at 09:45

## 2023-12-27 RX ADMIN — OXYCODONE HYDROCHLORIDE 10 MG: 10 TABLET ORAL at 19:53

## 2023-12-27 RX ADMIN — ACETAMINOPHEN 975 MG: 325 TABLET, FILM COATED ORAL at 05:23

## 2023-12-27 RX ADMIN — HYDROMORPHONE HYDROCHLORIDE 0.2 MG: 0.2 INJECTION, SOLUTION INTRAMUSCULAR; INTRAVENOUS; SUBCUTANEOUS at 21:14

## 2023-12-27 RX ADMIN — ATORVASTATIN CALCIUM 80 MG: 80 TABLET, FILM COATED ORAL at 01:07

## 2023-12-27 RX ADMIN — METHOCARBAMOL 500 MG: 500 TABLET ORAL at 19:54

## 2023-12-27 RX ADMIN — OXYCODONE HYDROCHLORIDE 10 MG: 10 TABLET ORAL at 06:51

## 2023-12-27 RX ADMIN — FOLIC ACID 1 MG: 1 TABLET ORAL at 09:45

## 2023-12-27 RX ADMIN — PREGABALIN 400 MG: 100 CAPSULE ORAL at 09:45

## 2023-12-27 ASSESSMENT — ACTIVITIES OF DAILY LIVING (ADL)
ADLS_ACUITY_SCORE: 31
DEPENDENT_IADLS:: CLEANING;COOKING;LAUNDRY;MEAL PREPARATION
ADLS_ACUITY_SCORE: 31

## 2023-12-27 NOTE — PROGRESS NOTES
"Orthopaedic Surgery Progress Note:  12/27/2023     Subjective:   NAEO. AFVSS. Pain present in low back and left hamstring. Denies radiating symptoms. Baseline numbness to BLE present 2/2 to neuropathy. Hoping to get up to a chair today. Denies fevers or chills, SOB, nor CP. Strikethrough through drain site overnight, reinforced by nursing.     Objective:   /47 (BP Location: Left arm, Patient Position: Right side, Cuff Size: Adult Regular)   Pulse 64   Temp 97.9  F (36.6  C) (Oral)   Resp 19   Ht 1.651 m (5' 5\")   Wt 104.5 kg (230 lb 6.1 oz)   SpO2 98%   BMI 38.34 kg/m    No intake/output data recorded.  Gen: NAD. Resting comfortably in bed  Resp: Breathing comfortably on RA  Drain:   Drain output of 155/75 at 24/7 hours, respectively.  Musculoskeletal: dressing c/d/I.    Lower extremities:  Motor Strength Right Left   Hip flexion: L1, L2, L3 5/5 5/5   Hip adduction: L2, L3 5/5 5/5   Knee flexion: S1 5/5 5/5   Knee extension: L3, L4 5/5 5/5   Ankle dosiflexion: L4, L5 5/5 5/5   EHL: L5 5/5 5/5   Ankle plantarflexion: S1 5/5 5/5     Sensation from L1-S2 is preserved although decreased at baseline 2/2 to neuropathy.     Labs:  Lab Results   Component Value Date    WBC 7.4 12/26/2023    HGB 13.8 12/26/2023     12/26/2023    INR 1.05 12/26/2023        Assessment & Plan:   Emile Wood is a 73 year old male with now s/p PSIF T12-S1 on 12/26 with Dr. Zavaleta.     Goals for today:  -PT  -Pain control  -Bernal removal    Ortho Primary  Activity: Up with assist until independent. No excessive bending or twisting. No lifting >10 lbs x 6 weeks. No Sharri lift for transfers.   Weight bearing status: WBAT.  Pain management: Transition from IV to PO as tolerated.    Antibiotics: Ancef until drains are removed.  Diet: Begin with clear fluids and progress diet as tolerated. Zachery nutritional supplement twice daily   DVT prophylaxis: SCDs only. Plan to resume preop ASA based on preop cards recs today, and " plan to restart plavix tomorrow (12/28), both have been ordered  Imaging: XR Upright L spine PTDC - ordered.  Labs: hgb pod1 pending  Bracing/Splinting: None.  Dressings: Keep clean, dry and intact. Patient may shower with bandage in place. Dressing to be removed 2 weeks after surgery. Please change or reinforce as necessary for strikethrough or saturation.   Drains: Document output per shift, will be discontinued at Orthopedic Surgery discretion.  Bernal catheter: Remove POD#1.   Physical Therapy/Occupational Therapy: Eval and treat.  Consults: Hospitalist.  Follow-up: Clinic with Dr. Zavaleta in 6 weeks with repeat x-rays.   Disposition: Pending progress with therapies, pain control on orals, post-op imaging, and drain removal.     Orthopaedics surgery staff for this patient is Dr. Zavaleta.     ------------------------------------------------------------------------------------------     [   ] Drains removed.  [   ] Post xrays done.  [   ] Discharge orders done.  [   ] Discharge summary started.     Diego Fofana MD  Orthopedic Surgery, PGY-4    FOLLOWUP:    Future Appointments   Date Time Provider Department [place]   12/27/2023  2:30 PM Geni Faith, PT [facility] [place]   12/27/2023  4:30 PM UR OT WAITLIST [facility] [place]   2/7/2024 11:20 AM Nacho Zavaleta MD [facility]

## 2023-12-27 NOTE — CONSULTS
Care Management Initial Consult    General Information  Assessment completed with: Patient,    Type of CM/SW Visit: Initial Assessment    Primary Care Provider verified and updated as needed: Yes   Readmission within the last 30 days: no previous admission in last 30 days      Reason for Consult: discharge planning  Advance Care Planning: Advance Care Planning Reviewed: no concerns identified (No HCD on file. Pt's wife will bring in copy.)          Communication Assessment  Patient's communication style: spoken language (English or Bilingual)    Hearing Difficulty or Deaf: no   Wear Glasses or Blind: yes    Cognitive  Cognitive/Neuro/Behavioral: WDL  Level of Consciousness: alert  Arousal Level: opens eyes spontaneously  Orientation: oriented x 4  Mood/Behavior: cooperative  Best Language: 0 - No aphasia  Speech: clear, spontaneous    Living Environment:   People in home: spouse  Blessing Wood  Current living Arrangements: house      Able to return to prior arrangements: yes       Family/Social Support:  Care provided by: self, spouse/significant other  Provides care for: no one, unable/limited ability to care for self  Marital Status:   Wife, Children, Neighbor  Blessing Wood       Description of Support System: Supportive, Involved    Support Assessment: Adequate family and caregiver support, Adequate social supports    Current Resources:   Patient receiving home care services: No     Community Resources: None  Equipment currently used at home: grab bar, toilet, grab bar, tub/shower, raised toilet seat, shower chair, walker, standard  Supplies currently used at home: None    Employment/Financial:  Employment Status: retired        Financial Concerns: none   Referral to Financial Worker: No       Does the patient's insurance plan have a 3 day qualifying hospital stay waiver?  No    Lifestyle & Psychosocial Needs:  Social Determinants of Health     Food Insecurity: Not on file   Depression: Not at risk  (10/10/2023)    PHQ-2     PHQ-2 Score: 1   Housing Stability: Not on file   Tobacco Use: Medium Risk (12/26/2023)    Patient History     Smoking Tobacco Use: Former     Smokeless Tobacco Use: Former     Passive Exposure: Past   Financial Resource Strain: Not on file   Alcohol Use: Not on file   Transportation Needs: Not on file   Physical Activity: Not on file   Interpersonal Safety: Not on file   Stress: Not on file   Social Connections: Not on file       Functional Status:  Prior to admission patient needed assistance:   Dependent ADLs:: Ambulation-walker, Bathing, Dressing  Dependent IADLs:: Cleaning, Cooking, Laundry, Meal Preparation  Assesssment of Functional Status: Not at baseline with mobility    Mental Health Status:  Mental Health Status: No Current Concerns  Mental Health Management:  (N/A)    Chemical Dependency Status:  Chemical Dependency Status: Past Concern (h/o cocaine use with inpatient treatment > 30 years ago.)  Chemical Dependency Management: Previous treatment          Values/Beliefs:  Spiritual, Cultural Beliefs, Pentecostalism Practices, Values that affect care: no       Cultural/Pentecostalism Practices Patient Routinely Participates In:  (N/A)       Additional Information:  HPI: Emile Wood is a 73 year old male patient with extensive cardiac history, admitted for posterior spinal fusion T12-S1, bilateral pelvic fixation on 12/26/23 with Dr. Zavaleta.    Writer met with patient in room to discuss discharge planning and needs. Introduced self and RNCC role. Verified address, phone number, PCP, and health insurance. Patient does not have a health care directive on file but he states that his wife will bring in a copy.    Patient lives in a split level house. There is a set of 9 stairs and another set of 7 stairs inside the home. There are 3 stairs outside to enter the house. Patient has grab bars at the toilet and shower, a shower chair, and a raised toilet seat.    He lives at home with his  wife, Blessing. Patient is dependent with showering, dressing, and household chores including food preparations due to persistent back pain. His wife provides assistance with ADLs and IADLs. Patient has a son nearby who can assist if needed and another son in [place]. He also has neighbors whom he can call if needed.     Patient uses a standard walker but he states that he will need a new one because the wheels are loose.    Family to transport at discharge.    Care management will continue to follow as discharge needs arise.        Deedee Parsons, MSN, APRN, [facility] - RN Care Coordinator  [facility] [place] [phone]    SEARCHABLE in [facility] - search CARE COORDINATOR     For Weekend & Holiday on call RN Care Coordinator:  (Tasks: Home care, home infusion, medical equipment, transportation, IMM & SCHROEDER forms, etc.)  [place] (0800 - 1630) Saturday and Sunday    Units: 5A, 5B, & 5C: [phone]    Units: 6B, 6C, 6D: [phone]    Units: 7A, 7B, 7C, 7D: [phone]    Units: 6A/ICU : [phone]     [facility] ([id number]) Saturday and Sunday    Units: 6 Med/Surg, 8A, 10 ICU, & Pediatric Units: [phone]    Units: 5 Ortho, 5Med/Surg & WB ED: [phone]

## 2023-12-27 NOTE — PROGRESS NOTES
[id number] Shift Summary:     Patient doing well this shift   Was able to get up to chair for 3 hours today.   PIV x2 went bad this AM - was unconnected from lido gtt and ketamine gtt for 3 hours, new PIVs placed bilaterally. Ketamine running with NS in right arm, lido running with NS in left arm.   IV abx.   Assist x2 with walker to transfer.   Alert and oriented.   On RA.   Pain managed with drips and PO oxy and scheduled tylenol.   Has chadwick in place.   Has drain x1 to right back.

## 2023-12-27 NOTE — PHARMACY-ADMISSION MEDICATION HISTORY
Pharmacist Admission Medication History    Admission medication history is complete. The information provided in this note is only as accurate as the sources available at the time of the update.    Information Source(s): Patient and CareEverywhere/SureScripts via in-person    Pertinent Information: n/a    Changes made to PTA medication list:  Added: None  Deleted: None  Changed:   Lyrica 200mg TID -> 400mg in the morning and 200mg in the evening.    Medication Affordability: No issues identified        Allergies reviewed with patient and updates made in EHR: yes    Medication History Completed By: Nacho Luis [facility] 12/27/2023 3:17 PM    PTA Med List   Medication Sig Last Dose    acetaminophen (TYLENOL) 325 MG tablet Take 650 mg by mouth as needed 12/26/2023    allopurinol (ZYLOPRIM) 300 MG tablet Take 300 mg by mouth every morning 12/26/2023    amiodarone (PACERONE) 200 MG tablet Take 200 mg by mouth every morning 12/26/2023    amitriptyline (ELAVIL) 10 MG tablet Take 10 mg by mouth at bedtime 12/25/2023    amLODIPine (NORVASC) 10 MG tablet Take 1 tablet by mouth every morning 12/26/2023    amoxicillin (AMOXIL) 500 MG capsule Take 2,000 mg by mouth as needed Dental work Unknown    aspirin (ASA) 81 MG chewable tablet Take 81 mg by mouth every morning 12/26/2023    atorvastatin (LIPITOR) 80 MG tablet Take 80 mg by mouth at bedtime Past Week    B Complex Vitamins (VITAMIN B COMPLEX PO) Take 1 tablet by mouth every morning Past Week    Calcium Carb-Cholecalciferol 600-800 MG-UNIT TABS Take 1 tablet by mouth daily (with lunch) 12/25/2023    clopidogrel (PLAVIX) 75 MG tablet Take 75 mg by mouth every morning Past Week    furosemide (LASIX) 20 MG tablet Take 60 mg by mouth every morning 12/25/2023    levothyroxine (SYNTHROID/LEVOTHROID) 175 MCG tablet Take 175 mcg by mouth every morning 12/26/2023    nitroglycerin (NITROSTAT) 0.4 MG SL tablet Place 1 tablet under the tongue every 5 minutes as needed for chest pain (CALL  911 IF NOT IMPROVED AFTER THREE CONSECUTIVE DOSES). Unknown    oxyCODONE-acetaminophen (PERCOCET)  MG per tablet Take 1 tablet by mouth every 6 hours as needed 12/25/2023    Pregabalin (LYRICA) 200 MG capsule Take 2 capsules (400mg) in the morning and 1 capsule (200mg) in the evening. 12/26/2023    sildenafil (VIAGRA) 100 MG tablet Take 100 mg by mouth daily as needed (sexual activity) 12/25/2023    vitamin B-12 (CYANOCOBALAMIN) 1000 MCG tablet Take 1,000 mcg by mouth every morning Past Week

## 2023-12-27 NOTE — CONSULTS
This note is a placeholder to fulfill consult order. Please see consult and progress notes from today.

## 2023-12-27 NOTE — PLAN OF CARE
Problem: Adult Inpatient Plan of Care  Goal: Absence of Hospital-Acquired Illness or Injury  Intervention: Identify and Manage Fall Risk  Recent Flowsheet Documentation  Taken 12/27/2023 0000 by Sharee Lund RN  Safety Promotion/Fall Prevention:   activity supervised   increase visualization of patient   lighting adjusted  Taken 12/26/2023 2148 by Sharee Lund RN  Safety Promotion/Fall Prevention:   room near nurse's station   supervised activity   lighting adjusted   increase visualization of patient  Intervention: Prevent and Manage VTE (Venous Thromboembolism) Risk  Recent Flowsheet Documentation  Taken 12/27/2023 0000 by Sharee Lund RN  VTE Prevention/Management: SCDs (sequential compression devices) on  Taken 12/26/2023 2142 by Sharee Lund RN  VTE Prevention/Management: SCDs (sequential compression devices) on  Goal: Optimal Comfort and Wellbeing  Intervention: Monitor Pain and Promote Comfort  Recent Flowsheet Documentation  Taken 12/27/2023 0115 by Sharee Lund RN  Pain Management Interventions:   medication (see MAR)   quiet environment facilitated   rest   repositioned   pillow support provided  Taken 12/26/2023 2250 by Sharee Lund RN  Pain Management Interventions:   medication (see MAR)   quiet environment facilitated   rest   repositioned   pillow support provided  Taken 12/26/2023 2128 by Sharee Lund RN  Pain Management Interventions:   medication (see MAR)   quiet environment facilitated   rest   repositioned   pillow support provided  Goal: Readiness for Transition of Care  Intervention: Mutually Develop Transition Plan  Recent Flowsheet Documentation  Taken 12/26/2023 2152 by Sharee Lund RN  Equipment Currently Used at Home: walker, standard   Goal Outcome Evaluation: reviewed with the pt.    Received from PACU [place], alert and oriented x4  With ongoing oxygen supplement via nc at 2LPM, on capnography  Complained of back pain-surgicl site,  Given IV Dilaudid, Oxycodone & Tylenol & Cold packs  Complained of numbness sensation Upper extremities- as baseline  On lidocaine drip & Ketamine drip  Surgical dressing with BECKY drain leaked, applied support dressing - Paged provider  Snacks given, consumed - good appetite  Bernal catheter - intact, draining well  Passed flatus  Assisted repositioning  Blood glucose checked  No acute changes on this shift

## 2023-12-27 NOTE — PROGRESS NOTES
CLINICAL NUTRITION SERVICES     Reason for Assessment: Provider Order - specify Nutrition Education - Patient is Post op Complex Spine - Patient needs high-protein education and ordering of preferred supplements at 1.5 g protein per kg body weight     Per chart review: 73 YOM who is s/p PSIF T12-S1 on 12/26, other past medical history noted for  persistent atrial fibrillation, CAD with multiple cardiac procedures, HTN, HLD, PVD, hypothyroidism, former smoker, gout, alcoholic neuropathy with current alcohol dependence, cocaine abuse in remission, depression, chronic opiate use, obesity, prediabetes, s/p LTKA, flatback syndrome, chronic low back pain, L2-S1 decompression (2016), TLIF L2-3 (2022). MAR reviewed. Labs reviewed. Diet advanced to regular with Zachery at meals. Weight trends reviewed and appear fairly stable over the last year, BMI of 38.3 classified as obesity grade II.     Per pt visit: RD visited pt/family at bedside, reviewed the importance of adequate nutritional intakes for healing from above, pt notes improving appetite, no bowel movement yet, reviewed available medications, encouraged good sources of protein at meals and reviewed supplement ordered by Provider, pt agreeing and will update order to below per pt flavor preference, family in room notes will help pt order meals.      Nutrition Diagnosis: Predicted food- and nutrition-related knowledge deficit r/t therapeutic recommendations     Interventions: Provided instruction on adequate protein intakes for post op healing, updated supplement order to Fruit punch Zachery BID at breakfast and lunch meals per pt preference.      Goals: Patient will verbalize understanding of therapeutic recommendations and accept supplement - met.      Follow-up: Will monitor per LOS policy unless otherwise consulted.      Lynda Abbasi RD, CNSC, LD  [facility] [address] Med/Surg RD pager: [phone]

## 2023-12-27 NOTE — PROGRESS NOTES
12/27/23 0800   Appointment Info   Signing Clinician's Name / Credentials (PT) Geni Faith DPT   Living Environment   People in Home spouse  (home w/ spouse, 24/7 support, physically able to help)   Current Living Arrangements house   Home Accessibility stairs to enter home;stairs within home   Number of Stairs, Main Entrance 3   Stair Railings, Main Entrance railing on left side (ascending)   Number of Stairs, Within Home, Primary nine  (split level; 9 to get to upper level; 7 to get lower)   Stair Railings, Within Home, Primary railings on both sides of stairs   Transportation Anticipated family or friend will provide   Living Environment Comments from home w/ spouse   Self-Care   Usual Activity Tolerance moderate  (using FWW at all times)   Current Activity Tolerance fair   Equipment Currently Used at Home walker, rolling  (has walking stick as well)   Fall history within last six months no  (lose balance easily but no falls)   Activity/Exercise/Self-Care Comment spouse assisted w/ showering at times; shower downstairs can do on his own; upstairs is higher and is hot tub now, has stool for this   General Information   Onset of Illness/Injury or Date of Surgery 12/26/23   Referring Physician Diego Fofana MD   Patient/Family Therapy Goals Statement (PT) to get out of bed; to be able to walk w/ walker   Pertinent History of Current Problem (include personal factors and/or comorbidities that impact the POC) Emile Wood is a 73 year old male with now s/p PSIF T12-S1 on 12/26 with Dr. Zavaleta.   Existing Precautions/Restrictions fall;spinal;weight bearing   Weight-Bearing Status - LUE partial weight-bearing (% in comments)  (10#)   Weight-Bearing Status - RUE partial weight-bearing (% in comments)  (10#)   Weight-Bearing Status - LLE weight-bearing as tolerated   Weight-Bearing Status - RLE weight-bearing as tolerated   General Observations Pt supine in bed on 2LO2 via NC, very motivated to get  OOB stating that the bed is causing him hamstring pain.   Cognition   Affect/Mental Status (Cognition) WFL   Pain Assessment   Patient Currently in Pain Yes, see Vital Sign flowsheet  (L hamstring 8/10)   Integumentary/Edema   Integumentary/Edema Comments pt  has post op incision w/ dressing and drain in place   Posture    Posture Forward head position;Protracted shoulders   Range of Motion (ROM)   Range of Motion ROM deficits secondary to surgical procedure;ROM deficits secondary to pain   Strength (Manual Muscle Testing)   Strength (Manual Muscle Testing) Deficits observed during functional mobility   Bed Mobility   Comment, (Bed Mobility) modAx1 bed mob   Transfers   Comment, (Transfers) minAx1 transfer w/ FWW   Gait/Stairs (Locomotion)   Comment, (Gait/Stairs) NT - unable to progress to functional gait at this time   Balance   Balance no deficits were identified   Sensory Examination   Sensory Perception Comments peripheral neuropathy in B hands and feet   Clinical Impression   Criteria for Skilled Therapeutic Intervention Yes, treatment indicated   PT Diagnosis (PT) impaired functional mobility   Influenced by the following impairments pain, decreased ROM, weakness, post op precautions, dizziness   Functional limitations due to impairments impaired bed mob, transfers, amb, stairs   Clinical Presentation (PT Evaluation Complexity) stable   Clinical Presentation Rationale per clinical judgment   Clinical Decision Making (Complexity) low complexity   Planned Therapy Interventions (PT) bed mobility training;gait training;home exercise program;patient/family education;stair training;ROM (range of motion);strengthening;transfer training   Risk & Benefits of therapy have been explained evaluation/treatment results reviewed;care plan/treatment goals reviewed;risks/benefits reviewed;current/potential barriers reviewed;participants voiced agreement with care plan;participants included;patient   Clinical Impression  Comments Pt limited by pain and fear of mobilizing on initial evaluation/treatment; anticipate w/ LOS pt will progress to DC home w/ spouse assist. He does need to complete several stairs and has not yet progressed to amb or stairs so if PT goals not met may require TCU stay.   PT Total Evaluation Time   PT Eval, Low Complexity Minutes (15770) 4   Physical Therapy Goals   PT Frequency 2x/day   PT Predicted Duration/Target Date for Goal Attainment 01/10/24   PT Goals Bed Mobility;Transfers;Gait;Stairs   PT: Bed Mobility Independent;Supine to/from sit;Rolling;Within precautions   PT: Transfers Supervision/stand-by assist;Sit to/from stand;Bed to/from chair;Assistive device;Within precautions   PT: Gait Supervision/stand-by assist;Assistive device;Rolling walker;Within precautions;150 feet   PT: Stairs Supervision/stand-by assist;9 stairs;Rail on both sides  (3 TATI w/ L rail; 9 stairs w/ B rails)   Interventions   Interventions Quick Adds Therapeutic Activity   Therapeutic Activity   Therapeutic Activities: dynamic activities to improve functional performance Minutes (41589) 23   Symptoms Noted During/After Treatment Increased pain;Dizziness   Treatment Detail/Skilled Intervention Pt supine in bed on PT arrival requesting to get OOB d/t L hamstring pain. Able to complete SLR w/ Fausto to remove SCDs. Educated on log rolling and post op precautions, pt familiar w/ these d/t past surgeries. Ortho enters room and pt completes log roll to his L to view his x-rays. Pt then completes log roll back to supine w/ CGA. Log roll to his R w/ Fausto needed and modA to trunk to move from sidelie to sit EOB. Pt has SBA sitting balance and tolerates well. Brought over FWW and educated on STS UE positioning, pt needs Fausto for STS. Pt reports fear initially but then progresses to improved stability and agrees to progress to SPT to recliner, pt completes w/ close CGAx1 and FWW. Pt seated upright in recliner, complains of initial dizzines,  BP: 146/72 (92), reports of improved dizziness while upright. Made comfortable w/ several pillows and brought breakfast tray near. Left w/ needs met.  (Pt on 2LO2 via NC throughout)   PT Discharge Planning   PT Plan progress ind w/ bed mob and transfers; pre-gait   PT Discharge Recommendation (DC Rec) Transitional Care Facility;home with assist   PT Rationale for DC Rec Pt limited by pain and fear of mobilizing on initial evaluation/treatment; anticipate w/ LOS pt will progress to DC home w/ spouse assist. He does need to complete several stairs and has not yet progressed to amb or stairs so if PT goals not met may require TCU stay.   PT Brief overview of current status Ax1 w/ bed mob and SPT w/ FWW   Total Session Time   Timed Code Treatment Minutes 23   Total Session Time (sum of timed and untimed services) 27

## 2023-12-27 NOTE — PROGRESS NOTES
[facility]    Medicine Progress Note - Hospitalist Service, GOLD TEAM 18    Date of Admission:  12/26/2023    Assessment & Plan   Emile Wood is a 73 year old male patient with a past medical history significant for persistent atrial fibrillation (cardioversion x 3 attempts), CAD s/p CABG 2002 and s/p PCI to proximal to mid LCx with overlapping 3.5 x 16 mm and 3.0 x 28 mm Synergy BLANCA and OM2 with 3.0 x 12 mm Synergy to OM2 on 3/3/18 in setting of STEMI, diastolic heart failure, severe aortic stenosis s/p 29 mm Hartman Jovany 3 bioprosthetic TAVR 5/26/21, and SSS s/p PPM after TAVR, bleed risk (LLE hematoma after a fall) so 7/13/23 he underwent successful left atrial appendage closure with 35 mm Watchman FLX device. Additionally, history of HTN, HLD, PVD, carotid artery stenosis (<50% stenosis R & L internal carotid arteries 2019), mild 4.2cm fusiform dilation of the ascending thoracic aorta, hypothyroidism, former smoker, gout, alcoholic neuropathy with current alcohol dependence (4 beers per day), cocaine abuse in remission, depression, chronic opiate use, obesity, prediabetes, s/p LTKA, flatback syndrome, chronic low back pain, L2-S1 decompression (2016), TLIF L2-3 (2022) who was admitted to [facility] after undergoing posterior spinal fusion T12-S1, bilateral pelvic fixation on 12/26/23 at [place] with Dr. Zavaleta.    Patient seen in [place] PACU prior to [facility] [place] [place] transfer.    Recommendations:   - Consider resume ASA if possible per Cardiology preference. Okay from their standpoint to continue holding Plavix for now.   - Ketamine and Amiodarone concurrently can increase risk of bradyarrhythmias. Patient has a pacemaker in place for SSS. Though, would recommend not increasing Ketamine and considering other avenues for pain control if paged about pain. Patient doing okay on 5mg/hr Ketamine drip in PACU currently without bradycardia on telemetry. Will continue  telemetry (ordered).   - Monitor for etoh withdrawal and add benzodiazepine if needed (CIWA scoring ordered).   - If utilizing insulin drip for BG control beyond OR and PACU (prediabetes history and received 10mg IV Decadron x 1), please notify medicine team and we will be happy to assist with BG management if needed.    S/p Posterior Spinal Fusion T12-S1 (12/26/23)  Ortho primary. EBL: 1000cc.   Procedures:  -Exploration of fusion mass L2-3  -Reinstrumentation L2-3  -Segmental instrumentation T12-L1, L4-S1  -Bilateral pelvic fixation with 22 modifier for stacked S2 AIS screws on patient's right  -Smith-Plummer osteotomy L4-5 and L5-S1  -Posterior spinal fusion T12-S1  -Use of morselized local autograft bone  -Use of allograft bone with bone morphogenic protein synthetic graft extender   - Management per primary orthopedics team including pain control, activity, antibiotics, DVT prophylaxis, wound cares. Please refer to their documentation for details.  -Activity: Up with assist until independent. Avoid repetitive lifting, bending, twisting  -Weight bearing status: No lifting greater than 10 lbs. .  -Pain management: Oral pain medications with IV for breakthrough. Wean IV as able.    -Antibiotics: Ancef x 24 hours; if drain in place continue IV antibiotics until drains out  -Diet: Begin with clear fluids and progress diet as tolerated. Zachery nutritional supplement twice daily  -DVT prophylaxis: Mechanical prophylaxis with SCD  -Imaging: Standing radiographs when able  -Labs: Monitor Hgb. Post op hgb at 10.8  -Bracing/Splinting: None. .   -Drains: Medium hemovac drain to be removed when output < 30 ml per shift   - Continue PTA Lyrica   - Discontinue Fley today ( POD#1)    Lactic Acidosis post-op, resolved   Lactate 2.4-->3.1-->3.5 intra-op within setting of muscle damage related to procedure. Hemoglobin pre-op 14.7-->13.9 post-op. VBG with normal pH.   - Infection deemed highly unlikely and elevated lactate most  likely related to expected muscle injury during procedure with possible component of dehydration.   - Consider additional IVF bolus in PACU, but will defer to primary surgical team and anesthesia team; per primary Orthopedic team, they plan to continue IVF overnight and recheck lactate in the morning.   Lactate this am at 1.5    Persistent Atrial Fibrillation s/p Watchman (7/13/2023)  CAD s/p 2-vessel CABG 2002 (LIMA-LAD, radial-rPDA) and PCI x 2 BLANCA (2018)  Severe Aortic Stenosis s/p TAVR (2021)  SSS s/p PPM (2021)  Chronic Diastolic Heart Failure (EF 60% 4/2023)  HTN and HLD  Last echocardiogram 4/21/23;TAVR with normal valve function, left ventricular ejection fraction is visually estimated at 60%, mild dilation of the aorta is present involving the ascending aorta (maximal dimension 4.4 cm).   - Cardiology requested patient to remain on ASA 81mg (defer to primary surgical service).   - Resume Plavix per primary surgery team discretion. Of note, Cardiology wants Plavix until 1/13/24   - Continue PTA Amiodarone 200mg daily   - Continue PTA Lipitor 80mg daily   - PTA Lasix 40mg in am and 20mg in pm on hold in the immediate post-op setting, please resume in 1-2 days pending BP trends   - PTA Norvasc 10mg on hold in immediate post-op setting, please resume in 1-2 days pending BP trends.   - Monitor I&O and daily weights   - Needs outpatient follow up with Cardiology 2/2024 for post-watchman check.    Alcohol Dependence  Previously drinking on average 5-6 drinks (beer, wine, hard alcohol). He weaned himself down and completely stopped drinking in preparation for procedure. Reports his last drink was ~2 weeks ago. Has never withdrawn.   - Does not need CIWA scoring    - Folate, B1, multivitamin supplements   - Check K, mag and phos, replace as needed (replacement protocols ordered)    Hypothyroidism   - Continue PTA Synthroid    Gout   - Continue PTA allopurinol    Depression   - Continue PTA Amitriptyline         "  Diet: Advance Diet as Tolerated: Regular Diet Adult  Snacks/Supplements Adult: Zachery; With Meals    DVT Prophylaxis: Pneumatic Compression Devices  Chadwick Catheter: PRESENT, indication: Other (Comment) (Spine surgery)  Lines: None     Cardiac Monitoring: ACTIVE order. Indication: amiodarone and ketamine concurrently, monitor for bradycardia  Code Status: Full Code      Clinically Significant Risk Factors                         # Obesity: Estimated body mass index is 38.34 kg/m  as calculated from the following:    Height as of this encounter: 1.651 m (5' 5\").    Weight as of this encounter: 104.5 kg (230 lb 6.1 oz)., PRESENT ON ADMISSION      # Pacemaker present       Disposition Plan      Expected Discharge Date: 12/30/2023      Destination: home with family              Dangelo Reid MD  Hospitalist Service, GOLD TEAM 18  [facility]  Securely message with [employer] (more info)  Text page via [employer] Paging/Directory   See signed in provider for up to date coverage information  ______________________________________________________________________    Interval History   Charts reviewed and patient was examined  Patient had an uneventful night   Denies any chest pain or SOB   No fevers or chills  No new tingling, numbness or weakness in his legs   Passing gas  Wants his chadwick catheter out today and motivated to pursue therapy   Pleasant mood      Physical Exam   Vital Signs: Temp: 98.5  F (36.9  C) Temp src: Oral BP: 113/46 Pulse: 67   Resp: 16 SpO2: 98 % O2 Device: Nasal cannula Oxygen Delivery: 2 LPM  Weight: 230 lbs 6.09 oz    General Appearance: Awake, alert and not in distress  Respiratory: Clear breath sounds bilaterally   Cardiovascular: Normal heart sounds. No murmurs   GI: Soft, non tender. Normal bowel sounds   Skin: No bruising or bleeding   Other:Awake, alert and orientated X 3       Medical Decision Making       35 MINUTES SPENT BY ME on the date " of service doing chart review, history, exam, documentation & further activities per the note.  MANAGEMENT DISCUSSED with the following over the past 24 hours: Patient, bedside RN, primary team and care management        Data     I have personally reviewed the following data over the past 24 hrs:    N/A  \   10.8 (L)   / N/A     142 108 (H) 12.6 /  135 (H)   4.3 26 0.63 (L) \     Procal: N/A CRP: N/A Lactic Acid: 1.5

## 2023-12-27 NOTE — PLAN OF CARE
Goal Outcome Evaluation:      Plan of Care Reviewed With: patient    Overall Patient Progress: no changeOverall Patient Progress: no change    Outcome Evaluation: Met with patient in room to discuss discharge planning.      Deedee Parsons, MSN, APRN, AGCNS-BC - RN Care Coordinator  5MS [phone]    SEARCHABLE in [facility] - search CARE COORDINATOR     For Weekend & Holiday on call RN Care Coordinator:  (Tasks: Home care, home infusion, medical equipment, transportation, IMM & SCHROEDER forms, etc.)  [place] (0800 - 1630) Saturday and Sunday    Units: 5A, 5B, & 5C: [phone]    Units: 6B, 6C, 6D: [phone]    Units: 7A, 7B, 7C, 7D: [phone]    Units: 6A/ICU : [phone]     [facility] (5077-4030) Saturday and Sunday    Units: 6 Med/Surg, 8A, 10 ICU, & Pediatric Units: [phone]    Units: 5 Ortho, 5Med/Surg & WB ED: [phone]

## 2023-12-28 ENCOUNTER — APPOINTMENT (OUTPATIENT)
Dept: PHYSICAL THERAPY | Facility: CLINIC | Age: 73
DRG: 456 | End: 2023-12-28
Attending: ORTHOPAEDIC SURGERY
Payer: MEDICARE

## 2023-12-28 LAB
ANION GAP SERPL CALCULATED.3IONS-SCNC: 5 MMOL/L (ref 7–15)
BUN SERPL-MCNC: 10.2 MG/DL (ref 8–23)
CALCIUM SERPL-MCNC: 8.2 MG/DL (ref 8.8–10.2)
CHLORIDE SERPL-SCNC: 104 MMOL/L (ref 98–107)
CREAT SERPL-MCNC: 0.56 MG/DL (ref 0.67–1.17)
DEPRECATED HCO3 PLAS-SCNC: 31 MMOL/L (ref 22–29)
EGFRCR SERPLBLD CKD-EPI 2021: >90 ML/MIN/1.73M2
GLUCOSE BLDC GLUCOMTR-MCNC: 167 MG/DL (ref 70–99)
GLUCOSE SERPL-MCNC: 145 MG/DL (ref 70–99)
HOLD SPECIMEN: NORMAL
MAGNESIUM SERPL-MCNC: 2 MG/DL (ref 1.7–2.3)
PHOSPHATE SERPL-MCNC: 2.7 MG/DL (ref 2.5–4.5)
POTASSIUM SERPL-SCNC: 3.9 MMOL/L (ref 3.4–5.3)
SODIUM SERPL-SCNC: 140 MMOL/L (ref 135–145)

## 2023-12-28 PROCEDURE — 99232 SBSQ HOSP IP/OBS MODERATE 35: CPT | Performed by: INTERNAL MEDICINE

## 2023-12-28 PROCEDURE — 83735 ASSAY OF MAGNESIUM: CPT | Performed by: PEDIATRICS

## 2023-12-28 PROCEDURE — 250N000013 HC RX MED GY IP 250 OP 250 PS 637: Performed by: PHYSICIAN ASSISTANT

## 2023-12-28 PROCEDURE — 36415 COLL VENOUS BLD VENIPUNCTURE: CPT | Performed by: INTERNAL MEDICINE

## 2023-12-28 PROCEDURE — 250N000013 HC RX MED GY IP 250 OP 250 PS 637: Performed by: NURSE PRACTITIONER

## 2023-12-28 PROCEDURE — 99232 SBSQ HOSP IP/OBS MODERATE 35: CPT | Mod: GC | Performed by: ANESTHESIOLOGY

## 2023-12-28 PROCEDURE — 97530 THERAPEUTIC ACTIVITIES: CPT | Mod: GP

## 2023-12-28 PROCEDURE — 250N000009 HC RX 250: Performed by: ORTHOPAEDIC SURGERY

## 2023-12-28 PROCEDURE — 84100 ASSAY OF PHOSPHORUS: CPT | Performed by: PEDIATRICS

## 2023-12-28 PROCEDURE — 120N000002 HC R&B MED SURG/OB UMMC

## 2023-12-28 PROCEDURE — 80048 BASIC METABOLIC PNL TOTAL CA: CPT | Performed by: INTERNAL MEDICINE

## 2023-12-28 PROCEDURE — 250N000013 HC RX MED GY IP 250 OP 250 PS 637

## 2023-12-28 PROCEDURE — 258N000003 HC RX IP 258 OP 636: Performed by: ORTHOPAEDIC SURGERY

## 2023-12-28 PROCEDURE — 250N000011 HC RX IP 250 OP 636

## 2023-12-28 PROCEDURE — 250N000013 HC RX MED GY IP 250 OP 250 PS 637: Performed by: STUDENT IN AN ORGANIZED HEALTH CARE EDUCATION/TRAINING PROGRAM

## 2023-12-28 RX ORDER — ATORVASTATIN CALCIUM 80 MG/1
80 TABLET, FILM COATED ORAL AT BEDTIME
Status: DISCONTINUED | OUTPATIENT
Start: 2023-12-28 | End: 2023-12-28

## 2023-12-28 RX ORDER — OXYCODONE HYDROCHLORIDE 5 MG/1
5-10 TABLET ORAL ONCE
Status: COMPLETED | OUTPATIENT
Start: 2023-12-28 | End: 2023-12-28

## 2023-12-28 RX ORDER — LIDOCAINE HYDROCHLORIDE ANHYDROUS AND DEXTROSE MONOHYDRATE .8; 5 G/100ML; G/100ML
0.5 INJECTION, SOLUTION INTRAVENOUS CONTINUOUS
Status: DISCONTINUED | OUTPATIENT
Start: 2023-12-28 | End: 2023-12-29

## 2023-12-28 RX ORDER — LIDOCAINE HYDROCHLORIDE ANHYDROUS AND DEXTROSE MONOHYDRATE .8; 5 G/100ML; G/100ML
0.5 INJECTION, SOLUTION INTRAVENOUS CONTINUOUS
Status: DISCONTINUED | OUTPATIENT
Start: 2023-12-28 | End: 2023-12-28

## 2023-12-28 RX ORDER — OXYCODONE HYDROCHLORIDE 5 MG/1
5 TABLET ORAL
Status: DISCONTINUED | OUTPATIENT
Start: 2023-12-28 | End: 2023-12-28

## 2023-12-28 RX ORDER — METHOCARBAMOL 500 MG/1
500 TABLET, FILM COATED ORAL 4 TIMES DAILY
Status: DISCONTINUED | OUTPATIENT
Start: 2023-12-28 | End: 2024-01-03 | Stop reason: HOSPADM

## 2023-12-28 RX ORDER — ACETAMINOPHEN 325 MG/1
975 TABLET ORAL EVERY 8 HOURS
Status: DISCONTINUED | OUTPATIENT
Start: 2023-12-28 | End: 2024-01-03 | Stop reason: HOSPADM

## 2023-12-28 RX ORDER — ACETAMINOPHEN 325 MG/1
975 TABLET ORAL EVERY 6 HOURS PRN
Status: DISCONTINUED | OUTPATIENT
Start: 2023-12-28 | End: 2023-12-28

## 2023-12-28 RX ORDER — OXYCODONE HYDROCHLORIDE 10 MG/1
10 TABLET ORAL
Status: DISCONTINUED | OUTPATIENT
Start: 2023-12-28 | End: 2023-12-28

## 2023-12-28 RX ORDER — FUROSEMIDE 20 MG
40 TABLET ORAL DAILY
Status: DISCONTINUED | OUTPATIENT
Start: 2023-12-28 | End: 2024-01-03 | Stop reason: HOSPADM

## 2023-12-28 RX ORDER — OXYCODONE HYDROCHLORIDE 10 MG/1
20 TABLET ORAL EVERY 4 HOURS PRN
Status: DISCONTINUED | OUTPATIENT
Start: 2023-12-28 | End: 2024-01-02

## 2023-12-28 RX ORDER — ACETAMINOPHEN 325 MG/1
975 TABLET ORAL EVERY 8 HOURS
Status: DISCONTINUED | OUTPATIENT
Start: 2023-12-28 | End: 2023-12-28

## 2023-12-28 RX ORDER — CLOPIDOGREL BISULFATE 75 MG/1
75 TABLET ORAL EVERY MORNING
Status: DISCONTINUED | OUTPATIENT
Start: 2023-12-28 | End: 2023-12-29

## 2023-12-28 RX ADMIN — ACETAMINOPHEN 975 MG: 325 TABLET, FILM COATED ORAL at 15:39

## 2023-12-28 RX ADMIN — KETAMINE HYDROCHLORIDE 5 MG/HR: 10 INJECTION INTRAMUSCULAR; INTRAVENOUS at 00:16

## 2023-12-28 RX ADMIN — METHOCARBAMOL 500 MG: 500 TABLET ORAL at 22:04

## 2023-12-28 RX ADMIN — SENNOSIDES AND DOCUSATE SODIUM 1 TABLET: 50; 8.6 TABLET ORAL at 22:05

## 2023-12-28 RX ADMIN — AMITRIPTYLINE HYDROCHLORIDE 10 MG: 10 TABLET, FILM COATED ORAL at 22:04

## 2023-12-28 RX ADMIN — AMIODARONE HYDROCHLORIDE 200 MG: 200 TABLET ORAL at 09:16

## 2023-12-28 RX ADMIN — HYDROXYZINE HYDROCHLORIDE 10 MG: 10 TABLET ORAL at 03:09

## 2023-12-28 RX ADMIN — ACETAMINOPHEN 975 MG: 325 TABLET, FILM COATED ORAL at 22:04

## 2023-12-28 RX ADMIN — METHOCARBAMOL 500 MG: 500 TABLET ORAL at 11:24

## 2023-12-28 RX ADMIN — PREGABALIN 200 MG: 100 CAPSULE ORAL at 22:05

## 2023-12-28 RX ADMIN — FAMOTIDINE 20 MG: 20 TABLET ORAL at 22:06

## 2023-12-28 RX ADMIN — METHOCARBAMOL 500 MG: 500 TABLET ORAL at 03:09

## 2023-12-28 RX ADMIN — CLOPIDOGREL BISULFATE 75 MG: 75 TABLET ORAL at 09:14

## 2023-12-28 RX ADMIN — ALLOPURINOL 300 MG: 300 TABLET ORAL at 09:14

## 2023-12-28 RX ADMIN — CEFAZOLIN SODIUM 2 G: 2 INJECTION, SOLUTION INTRAVENOUS at 13:47

## 2023-12-28 RX ADMIN — PREGABALIN 400 MG: 100 CAPSULE ORAL at 09:15

## 2023-12-28 RX ADMIN — HYDROXYZINE HYDROCHLORIDE 10 MG: 10 TABLET ORAL at 09:14

## 2023-12-28 RX ADMIN — SENNOSIDES AND DOCUSATE SODIUM 1 TABLET: 50; 8.6 TABLET ORAL at 09:14

## 2023-12-28 RX ADMIN — FAMOTIDINE 20 MG: 20 TABLET ORAL at 09:15

## 2023-12-28 RX ADMIN — ASPIRIN 81 MG CHEWABLE TABLET 81 MG: 81 TABLET CHEWABLE at 09:14

## 2023-12-28 RX ADMIN — HYDROMORPHONE HYDROCHLORIDE 0.4 MG: 0.2 INJECTION, SOLUTION INTRAMUSCULAR; INTRAVENOUS; SUBCUTANEOUS at 01:39

## 2023-12-28 RX ADMIN — Medication 1 TABLET: at 09:15

## 2023-12-28 RX ADMIN — CEFAZOLIN SODIUM 2 G: 2 INJECTION, SOLUTION INTRAVENOUS at 22:04

## 2023-12-28 RX ADMIN — HYDROMORPHONE HYDROCHLORIDE 0.4 MG: 0.2 INJECTION, SOLUTION INTRAMUSCULAR; INTRAVENOUS; SUBCUTANEOUS at 05:55

## 2023-12-28 RX ADMIN — OXYCODONE HYDROCHLORIDE 10 MG: 10 TABLET ORAL at 09:14

## 2023-12-28 RX ADMIN — FOLIC ACID 1 MG: 1 TABLET ORAL at 09:14

## 2023-12-28 RX ADMIN — OXYCODONE HYDROCHLORIDE 10 MG: 10 TABLET ORAL at 00:29

## 2023-12-28 RX ADMIN — POLYETHYLENE GLYCOL 3350 17 G: 17 POWDER, FOR SOLUTION ORAL at 09:14

## 2023-12-28 RX ADMIN — OXYCODONE HYDROCHLORIDE 10 MG: 10 TABLET ORAL at 05:03

## 2023-12-28 RX ADMIN — CEFAZOLIN SODIUM 2 G: 2 INJECTION, SOLUTION INTRAVENOUS at 05:04

## 2023-12-28 RX ADMIN — ACETAMINOPHEN 975 MG: 325 TABLET, FILM COATED ORAL at 09:11

## 2023-12-28 RX ADMIN — THIAMINE HCL TAB 100 MG 100 MG: 100 TAB at 09:15

## 2023-12-28 RX ADMIN — OXYCODONE HYDROCHLORIDE 10 MG: 5 TABLET ORAL at 11:24

## 2023-12-28 RX ADMIN — OXYCODONE HYDROCHLORIDE 15 MG: 5 TABLET ORAL at 22:58

## 2023-12-28 RX ADMIN — ATORVASTATIN CALCIUM 80 MG: 80 TABLET, FILM COATED ORAL at 22:06

## 2023-12-28 RX ADMIN — METHOCARBAMOL 500 MG: 500 TABLET ORAL at 15:39

## 2023-12-28 RX ADMIN — ACETAMINOPHEN 975 MG: 325 TABLET, FILM COATED ORAL at 00:34

## 2023-12-28 RX ADMIN — OXYCODONE HYDROCHLORIDE 15 MG: 5 TABLET ORAL at 18:16

## 2023-12-28 ASSESSMENT — ACTIVITIES OF DAILY LIVING (ADL)
ADLS_ACUITY_SCORE: 33
ADLS_ACUITY_SCORE: 33
ADLS_ACUITY_SCORE: 31
ADLS_ACUITY_SCORE: 33
ADLS_ACUITY_SCORE: 37
ADLS_ACUITY_SCORE: 33
ADLS_ACUITY_SCORE: 33

## 2023-12-28 NOTE — PROGRESS NOTES
"Orthopaedic Surgery Progress Note:  12/28/2023     Subjective:   NAEO. AFVSS. Pain present in low back and left hamstring. Denies radiating symptoms. Baseline numbness to BLE present 2/2 to neuropathy. Ambulated with PT yesterday. Resting comfortably this morning.     Bernal to be removed today    Objective:   BP (!) 144/83   Pulse 71   Temp 98.6  F (37  C)   Resp 16   Ht 1.651 m (5' 5\")   Wt 104.5 kg (230 lb 6.1 oz)   SpO2 97%   BMI 38.34 kg/m    No intake/output data recorded.  Gen: NAD. Resting comfortably in bed  Resp: Breathing comfortably on RA  Drain:   Drain output of 0/0 at 24/7 hours, respectively.  Musculoskeletal: dressing c/d/I.    Lower extremities:  Motor Strength Right Left   Hip flexion: L1, L2, L3 5/5 5/5   Hip adduction: L2, L3 5/5 5/5   Knee flexion: S1 5/5 5/5   Knee extension: L3, L4 5/5 5/5   Ankle dosiflexion: L4, L5 5/5 5/5   EHL: L5 5/5 5/5   Ankle plantarflexion: S1 5/5 5/5     Sensation from L1-S2 is preserved although decreased at baseline 2/2 to neuropathy.     Labs:  Lab Results   Component Value Date    WBC 7.4 12/26/2023    HGB 10.8 (L) 12/27/2023     12/26/2023    INR 1.05 12/26/2023        Assessment & Plan:   Emile Wood is a 73 year old male with now s/p PSIF T12-S1 on 12/26 with Dr. Zavaleta.     Goals for today:  -PT  -XRs  -Pain control  -Bernal removal    Ortho Primary  Activity: Up with assist until independent. No excessive bending or twisting. No lifting >10 lbs x 6 weeks. No Sharri lift for transfers.   Weight bearing status: WBAT.  Pain management: Transition from IV to PO as tolerated.    Antibiotics: Ancef until drains are removed.  Diet: Begin with clear fluids and progress diet as tolerated. Zachery nutritional supplement twice daily   DVT prophylaxis: SCDs only. PTA ASA, and plan to restart plavix today (12/28), both have been ordered  Imaging: XR Upright L spine PTDC - ordered.  Labs: hgb pod1 - 10.8  Bracing/Splinting: None.  Dressings: Keep " clean, dry and intact. Patient may shower with bandage in place. Dressing to be removed 2 weeks after surgery. Please change or reinforce as necessary for strikethrough or saturation.   Drains: Document output per shift, will be discontinued at Orthopedic Surgery discretion.  Bernal catheter: Remove today  Physical Therapy/Occupational Therapy: Eval and treat.  Consults: Hospitalist.  Follow-up: Clinic with Dr. Zavaleta in 6 weeks with repeat x-rays.   Disposition: Pending progress with therapies, pain control on orals, post-op imaging, and drain removal.     Orthopaedics surgery staff for this patient is Dr. Zavaleta.     ------------------------------------------------------------------------------------------  [  x ] Drains removed.  [   ] Post xrays complete (12/29) due to patient's pain being high.  [   ] Discharge orders done.  [   ] Discharge summary started.     Diego Fofana MD  Orthopedic Surgery, PGY-4    FOLLOWUP:    Future Appointments   Date Time Provider Department [place]   12/27/2023  2:30 PM Geni Faith, PT URPT [place]   12/27/2023  4:30 PM UR OT WAITLIST UROT [place]   2/7/2024 11:20 AM Nacho Zavaleta MD [facility]        Update at 1100: Per pain team recommendations, we will increase patient's oxycodone dosing. Patient will get a 1 time dose of 5-10mg oxycodone to bridge for better pain control. Starting at 1400, (approximately 3 hours after his last 5-10mg dose) the patient's oral pain medications will be changed to 15-20 mg oxycodone and scheduled 975 of tylenol. This timing was set up hoping to avoid side effects of oversedation, while providing adequate relief as needed. Thank you to the pain team for their recommendations.  Abelardo Cardenas, NICOLE

## 2023-12-28 NOTE — PROGRESS NOTES
"Care Management Follow Up    Length of Stay (days): 2    Expected Discharge Date: 12/30/2023     Concerns to be Addressed: Discharge planning     Patient plan of care discussed at interdisciplinary rounds: Yes    Anticipated Discharge Disposition: Acute Rehab vs Home     Anticipated Discharge Services: None  Anticipated Discharge DME: [facility]    Patient/family educated on Medicare website which has current facility and service quality ratings: No  Education Provided on the Discharge Plan: Yes  Patient/Family in Agreement with the Plan: Yes    Referrals Placed by CM/SW: External Care Coordination  Private pay costs discussed: Not applicable    Additional Information:  PT currently is recommending home with assist vs TCU placement. Additionally, social work received a message from Sharona at  rehab stating, \"we will follow for ARC vs TCU. Currently looks appropriate for ARC, will follow progress. Awaiting OT eval. Still on IV pain meds, also febrile\". Social work will continue to follow. Discharge plan is currently TBD.    XOCHILT Orellana, LGSW  5 Med Surg and 10 ICU   [facility]  Phone: [phone]  Pager: [phone]  "

## 2023-12-28 NOTE — CARE PLAN
"Shift: 1500 - 1900    /43 (BP Location: Right arm)   Pulse 65   Temp 98.3  F (36.8  C) (Oral)   Resp 18   Ht 1.651 m (5' 5\")   Wt 104.5 kg (230 lb 6.1 oz)   SpO2 96%   BMI 38.34 kg/m      Patient AOX4, Denies shortness of breath,chest pain,  reports baseline numbness/tingling due to neuropathy. No nausea/vomiting,no fever or chills. Pt reports pain - managed with PRN  IV dilaudid 0.4  x 1 this shift. Dressing reinforced from previous shift. Handoff, Pt is able to  make needs known call light is in reach, continue with POC.    "

## 2023-12-28 NOTE — PLAN OF CARE
Goal Outcome Evaluation:      Plan of Care Reviewed With: patient    Overall Patient Progress: improving      A/O x4, forgetful. Previous RN told writer that dressing has been leaking. Upon checking site pts drain was completely out & team paged. Pain managed with oxy & DiL. Ket & lido drip running. Denies SOB or chest pain. 2L NC overnight.    Pt spiked a temp of 100.6, provider was paged & temp back down.

## 2023-12-28 NOTE — PROGRESS NOTES
[facility]    Medicine Progress Note - Hospitalist Service, GOLD TEAM 18    Date of Admission:  12/26/2023    Assessment & Plan   Emile Wood is a 73 year old male patient with a past medical history significant for persistent atrial fibrillation (cardioversion x 3 attempts), CAD s/p CABG 2002 and s/p PCI to proximal to mid LCx with overlapping 3.5 x 16 mm and 3.0 x 28 mm Synergy BLANCA and OM2 with 3.0 x 12 mm Synergy to OM2 on 3/3/18 in setting of STEMI, diastolic heart failure, severe aortic stenosis s/p 29 mm Hartman Jovany 3 bioprosthetic TAVR 5/26/21, and SSS s/p PPM after TAVR, bleed risk (LLE hematoma after a fall) so 7/13/23 he underwent successful left atrial appendage closure with 35 mm Watchman FLX device. Additionally, history of HTN, HLD, PVD, carotid artery stenosis (<50% stenosis R & L internal carotid arteries 2019), mild 4.2cm fusiform dilation of the ascending thoracic aorta, hypothyroidism, former smoker, gout, alcoholic neuropathy with current alcohol dependence (4 beers per day), cocaine abuse in remission, depression, chronic opiate use, obesity, prediabetes, s/p LTKA, flatback syndrome, chronic low back pain, L2-S1 decompression (2016), TLIF L2-3 (2022) who was admitted to [facility] after undergoing posterior spinal fusion T12-S1, bilateral pelvic fixation on 12/26/23 at [place] with Dr. Zavaleta.    S/p Posterior Spinal Fusion T12-S1 (12/26/23), POD#2  Ortho primary. EBL: 1000cc.    - Management per primary orthopedics team including pain control, activity, antibiotics, DVT prophylaxis, wound cares. Please refer to their documentation for details.  -Activity: Up with assist until independent. Avoid repetitive lifting, bending, twisting  -Weight bearing status: No lifting greater than 10 lbs. .  -Pain management: Oral pain medications with IV for breakthrough. Wean IV as able. Note that patient is already on Ketamine and Lidocaine infusion in  addition to Oxycodone, and still continues to have pain. Pain team has been consulted today ( 12/28)  -Antibiotics: Ancef x 24 hours; if drain in place continue IV antibiotics until drains out. Drain fell out on 12/28 early morning  -Diet: Advanced to regular diet   -DVT prophylaxis: Mechanical prophylaxis with SCD  -Labs: Monitor Hgb. Post op hgb at 10.8, reflecting acute blood loss anemia   -Bracing/Splinting: None.   -Drains:    - Continue PTA Lyrica   - Discontinue Fley today ( Due to pain control issues, this was not removed on POD#1)    Lactic Acidosis post-op, resolved   Lactate 2.4-->3.1-->3.5 intra-op within setting of muscle damage related to procedure. Hemoglobin pre-op 14.7-->13.9 post-op. VBG with normal pH.   - Infection deemed highly unlikely and elevated lactate most likely related to expected muscle injury during procedure with possible component of dehydration.   - Consider additional IVF bolus in PACU, but will defer to primary surgical team and anesthesia team; per primary Orthopedic team, they plan to continue IVF overnight and recheck lactate in the morning.   Lactate this am at 1.5    Persistent Atrial Fibrillation s/p Watchman (7/13/2023)  CAD s/p 2-vessel CABG 2002 (LIMA-LAD, radial-rPDA) and PCI x 2 BLANCA (2018)  Severe Aortic Stenosis s/p TAVR (2021)  SSS s/p PPM (2021)  Chronic Diastolic Heart Failure (EF 60% 4/2023)  HTN and HLD  Last echocardiogram 4/21/23;TAVR with normal valve function, left ventricular ejection fraction is visually estimated at 60%, mild dilation of the aorta is present involving the ascending aorta (maximal dimension 4.4 cm).   - Cardiology requested patient to remain on ASA 81mg .  Aspirin was restarted on POD#2   - Resume Plavix per primary surgery team discretion. Of note, Cardiology wants Plavix until 1/13/24.    - Continue PTA Amiodarone 200mg daily ( Note: interacts with ketamine. Caution on increasing the dose of ketamine infusion)   - Continue PTA Lipitor 80mg  "daily   - PTA Lasix 40mg in am and 20mg in pm on hold in the immediate post-op setting, please resume in 1-2 days pending BP trends. Will resume am lasix ( 40 mg) on 12/28   - PTA Norvasc 10mg on hold in immediate post-op setting, please resume in 1-2 days pending BP trends.   - Monitor I&O and daily weights   - Needs outpatient follow up with Cardiology 2/2024 for post-watchman check.    Alcohol Dependence  Previously drinking on average 5-6 drinks (beer, wine, hard alcohol). He weaned himself down and completely stopped drinking in preparation for procedure. Reports his last drink was ~2 weeks ago. Has never withdrawn.   - Does not need CIWA scoring    - Folate, B1, multivitamin supplements      Hypothyroidism   - Continue PTA Synthroid    Gout   - Continue PTA allopurinol    Depression   - Continue PTA Amitriptyline          Diet: Advance Diet as Tolerated: Regular Diet Adult  Snacks/Supplements Adult: Zachery; With Meals    DVT Prophylaxis: Pneumatic Compression Devices  Bernal Catheter: PRESENT, indication: Wound Healing  Lines: None     Cardiac Monitoring: None  Code Status: Full Code      Clinically Significant Risk Factors                         # Obesity: Estimated body mass index is 38.34 kg/m  as calculated from the following:    Height as of this encounter: 1.651 m (5' 5\").    Weight as of this encounter: 104.5 kg (230 lb 6.1 oz)., PRESENT ON ADMISSION     # Financial/Environmental Concerns: none   # Pacemaker present       Disposition Plan     Expected Discharge Date: 12/30/2023      Destination: home with family              Dangelo Reid MD  Hospitalist Service, GOLD TEAM 18  [facility]  Securely message with [employer] (more info)  Text page via [facility] Paging/Directory   See signed in provider for up to date coverage information  ______________________________________________________________________    Interval History   Charts reviewed and " patient was examined  Patient complains of significant operative site pain. Denies any tingling, numbness or weakness in lower extremities  Denies chest pain or SOB   Passing gas     Physical Exam   Vital Signs: Temp: 98.9  F (37.2  C) Temp src: Oral BP: 123/57 Pulse: 67   Resp: 18 SpO2: 92 % O2 Device: Nasal cannula Oxygen Delivery: 2 LPM  Weight: 230 lbs 6.09 oz    General Appearance: Awake, alert and not in distress  Respiratory: Clear breath sounds bilaterally   Cardiovascular: Normal heart sounds. No murmurs   GI: Soft, non tender. Normal bowel sounds   Skin: No bruising or bleeding   Other:Awake, alert and orientated X 3       Medical Decision Making       35 MINUTES SPENT BY ME on the date of service doing chart review, history, exam, documentation & further activities per the note.  MANAGEMENT DISCUSSED with the following over the past 24 hours: Patient, bedside RN, primary team and care management        Data     I have personally reviewed the following data over the past 24 hrs:    N/A  \   N/A   / N/A     140 104 10.2 /  145 (H)   3.9 31 (H) 0.56 (L) \

## 2023-12-28 NOTE — PROVIDER NOTIFICATION
Updated othro about pts BECKY dressing being completely saturated. Upon assessing drain appears to have been pulled out. Advised to change dressing & ortho will assess in AM.

## 2023-12-28 NOTE — CONSULTS
Pain Service Consultation Note  [facility]      Patient Name: Emile Wood  MRN: [id number]   Age: 73 year old  Sex: male  Date: December 28, 2023                                      Reviewed: Yes    Referring Provider:  Abelardo Cardenas NP   Referring Service:  Orthopedics  Reason for Consultation: Post op pain management    Assessment/Recommendations:  Emile Wood is a 73 year old male patient with a past medical history significant for persistent atrial fibrillation (cardioversion x 3 attempts), CAD s/p CABG 2002 and s/p PCI to proximal to mid LCx with overlapping 3.5 x 16 mm and 3.0 x 28 mm Synergy BLANCA and OM2 with 3.0 x 12 mm Synergy to OM2 on 3/3/18 in setting of STEMI, diastolic heart failure, severe aortic stenosis s/p 29 mm Hartman Jovany 3 bioprosthetic TAVR 5/26/21, and SSS s/p PPM after TAVR, bleed risk (LLE hematoma after a fall) so 7/13/23 he underwent successful left atrial appendage closure with 35 mm Watchman FLX device. Additionally, history of HTN, HLD, PVD, carotid artery stenosis, mild 4.2cm fusiform dilation of the ascending thoracic aorta, hypothyroidism, former smoker, gout, alcoholic neuropathy with current alcohol dependence (4 beers per day), depression, chronic opiate use, obesity, prediabetes, s/p LTKA, flatback syndrome, chronic low back pain, L2-S1 decompression (2016), TLIF L2-3 (2022) who was admitted to [facility] after undergoing posterior spinal fusion T12-S1, bilateral pelvic fixation on 12/26/23 at [facility] with Dr. Zavaleta.     Plan:   - Continue amitriptyline 10 mg nightly  - Acetaminophen 975mg TID  - Start oxycodone 15-20mg every 3 hours as needed  - Continue pregabalin 400 in the morning and 200 mg at night   - Continue lidocaine infusion.  - Continue Dilaudid 0.4 IV every 2 hours as needed (try p.o. at first)  - Start Robaxin 500 mg 4 times daily    Thank you for the opportunity to participate in the care of Emile TRUJILLO  [place]  Pain Service will continue to follow.    Discussed with attending anesthesiologist  Primary Service Contacted with Recommendations? Yes    Issa Ceron MD  12/28/2023      Chief Complaint:  Post op pain management      History of Present Illness:  Emile Wood is a 73 year old male with now s/p PSIF T12-S1 on 12/26 with Dr. Zavaleta. Patient reports his pain as uncontrolled, but it has slightly improved since yesterday. Yesterday his pain was 9-10/10 when moving and 7/10 when laying still. Today his pain is 8/10 when moving and 6/10 when laying still. Most of his pain is located in his lower back.       Past pain treatments have included PT, and surgery. Pharmacological treatments (in past) have included Percocet. Past surgeries include T12-S1 fusion.       Past Medical History:  Past Medical History:   Diagnosis Date    Alcohol dependence, continuous drinking behavior (H)     Alcoholic polyneuropathy (H24)     Arthritis     knee and back    CAD (coronary artery disease)     Carotid artery stenosis     Chronic diastolic CHF (congestive heart failure) (H)     Chronic sinusitis     Cocaine abuse in remission (H)     in remission since treatment in 1987    Flatback syndrome of lumbosacral region     Gout, chronic     Heart murmur     Hypertension     Irregular heart beat     hx at fib with 3 cardioversions    Numbness and tingling     peripheral neuropathy    Pacemaker     Peripheral neuropathy     Stented coronary artery     stent and CABG    Thyroid disease     Unspecified atrial fibrillation (H)          Family History:    Family History   Problem Relation Age of Onset    Cerebrovascular Disease Mother     Cerebrovascular Disease Father     Lung Cancer Father     Heart Disease Brother     Anesthesia Reaction No family hx of     Thrombosis No family hx of        Social History:  Social History     Tobacco Use    Smoking status: Former     Types: Cigarettes     Quit date: 2018     Years  since quittin.9     Passive exposure: Past    Smokeless tobacco: Former    Tobacco comments:     smoke cigars   Substance Use Topics    Alcohol use: Yes     Alcohol/week: 35.0 - 42.0 standard drinks of alcohol     Types: 35 - 42 Standard drinks or equivalent per week     Comment: daily- 4 beers, sometimes glass of wine, shot of whiskey         Tobacco:   former cigarette smoke  ETOH:  yes, 4-5 beers per day.  H/O Substance Abuse:  none      Review of Systems:  Complete ROS reviewed. Unless otherwise noted, all other systems found to be negative.        Laboratory Results:  Recent Labs   Lab Test 23  0647 23  0619 23  0642   INR  --   --  1.05   PLT  --   --  259   PTT  --   --  32   BUN 10.2   < > 20.6    < > = values in this interval not displayed.       Allergies:  No Known Allergies      Current Pain Related Medications:  Medications related to Pain Management (From now, onward)      Start     Dose/Rate Route Frequency Ordered Stop    23 0900  oxyCODONE (ROXICODONE) tablet 5 mg        See Hyperspace for full Linked Orders Report.    5 mg Oral EVERY 3 HOURS PRN 23 0843      23 0900  oxyCODONE IR (ROXICODONE) tablet 10 mg        See Hyperspace for full Linked Orders Report.    10 mg Oral EVERY 3 HOURS PRN 23 0843      23 0900  acetaminophen (TYLENOL) tablet 975 mg         975 mg Oral EVERY 6 HOURS PRN 23 0843      23 2200  amitriptyline (ELAVIL) tablet 10 mg        Note to Pharmacy: PTA Sig:Take 10 mg by mouth at bedtime      10 mg Oral AT BEDTIME 23 1200  aspirin (ASA) chewable tablet 81 mg         81 mg Oral DAILY 23 0735      23 0800  polyethylene glycol (MIRALAX) Packet 17 g         17 g Oral DAILY 23 0800  pregabalin (LYRICA) capsule 400 mg        Note to Pharmacy: PTA Sig:Take 200-400 mg by mouth 2 times daily Take 2 capsules in the morning and  one capsule in the evening      400 mg  "Oral EVERY MORNING 12/26/23 2038 12/26/23 2100  senna-docusate (SENOKOT-S/PERICOLACE) 8.6-50 MG per tablet 1 tablet         1 tablet Oral 2 TIMES DAILY 12/26/23 2038 12/26/23 2100  pregabalin (LYRICA) capsule 200 mg         200 mg Oral EVERY EVENING 12/26/23 2038 12/26/23 2038  magnesium hydroxide (MILK OF MAGNESIA) suspension 30 mL         30 mL Oral DAILY PRN 12/26/23 2038 12/26/23 2038  bisacodyl (DULCOLAX) suppository 10 mg         10 mg Rectal DAILY PRN 12/26/23 2038 12/26/23 2038  HYDROmorphone (DILAUDID) injection 0.2 mg        See Hyperspace for full Linked Orders Report.    0.2 mg Intravenous EVERY 2 HOURS PRN 12/26/23 2038 12/26/23 2038  HYDROmorphone (DILAUDID) injection 0.4 mg        See Hyperspace for full Linked Orders Report.    0.4 mg Intravenous EVERY 2 HOURS PRN 12/26/23 2038 12/26/23 2038  methocarbamol (ROBAXIN) tablet 500 mg         500 mg Oral EVERY 6 HOURS PRN 12/26/23 2038 12/26/23 2038  hydrOXYzine HCl (ATARAX) tablet 10 mg         10 mg Oral EVERY 6 HOURS PRN 12/26/23 2038 12/26/23 2038  lidocaine 1 % 0.1-1 mL         0.1-1 mL Other EVERY 1 HOUR PRN 12/26/23 2038 12/26/23 2038  lidocaine (LMX4) cream          Topical EVERY 1 HOUR PRN 12/26/23 2038                Physical Exam:  Vitals: /57 (BP Location: Left arm)   Pulse 67   Temp 37.2  C (98.9  F) (Oral)   Resp 18   Ht 1.651 m (5' 5\")   Wt 104.5 kg (230 lb 6.1 oz)   SpO2 92%   BMI 38.34 kg/m      Physical Exam:   CONSTITUTIONAL/GENERAL APPEARANCE:  NAD. Laying in bed supine.  RESPIRATORY: non-labored breathing. No cough, wheeze  CV: RRR  MUSCULOSKELETAL/BACK/SPINE/EXTREMITIES: Moves all extremities purposefully.  NEURO: Alert and Oriented x3. Answers questions appropriately. 5/5 strength throughout lower extremities except 4/5 strength with L flexion.      Please see A&P for additional details of medical decision making.      Acute Inpatient Pain Service [facility]  Hours of pain " "coverage 24/7   Page via Amcom- Please Page the Pain Team Via Amcom: \"PAIN MANAGEMENT ACUTE INPATIENT/ [facility]\"            "

## 2023-12-28 NOTE — PROGRESS NOTES
[id number] Shift Summary:     Patient doing well this shift.   Ketamine stopped this AM, lido continuing to run in right PIV per pain team - order  this evening - paged for new order.   Has NS ordered - patient requested to stop NS d/t incontinence issues.    Increase pain management per pain team - increased oxycodone to 15mg Q3h, linked with 20mg Q3h if needed.  Also has robaxin QID, tylenol Q8h, and PRN IV dilaudid for breakthrough pain.   Bernal removed this AM - had incontinent episode in chair x2, attempted to empty bladder on toilet, was unable - bladder scanned for 249 - repeat bladder scan at 1900.  Assist x1 with walker and gb - patient is slow to move and needs direction with walker, but steady on feet.   Has baseline numbness to bilateral feet and bilateral hands.   RN managed K, Mg and phos at goal   Patient was on 2L this AM, weaned to RA, then put back on 1L this afternoon after desatting to mid 80s. Now satting low 90s on 1L.   Has PIV in bilateral arms - Right PIV running saline and lido. Left PIV is locked.   Patient up to chair multiple times this shift, doing well.   Continue with POC.

## 2023-12-29 ENCOUNTER — APPOINTMENT (OUTPATIENT)
Dept: OCCUPATIONAL THERAPY | Facility: CLINIC | Age: 73
DRG: 456 | End: 2023-12-29
Payer: MEDICARE

## 2023-12-29 ENCOUNTER — APPOINTMENT (OUTPATIENT)
Dept: PHYSICAL THERAPY | Facility: CLINIC | Age: 73
DRG: 456 | End: 2023-12-29
Attending: ORTHOPAEDIC SURGERY
Payer: MEDICARE

## 2023-12-29 LAB
ANION GAP SERPL CALCULATED.3IONS-SCNC: 5 MMOL/L (ref 7–15)
BUN SERPL-MCNC: 16.7 MG/DL (ref 8–23)
CALCIUM SERPL-MCNC: 8 MG/DL (ref 8.8–10.2)
CHLORIDE SERPL-SCNC: 103 MMOL/L (ref 98–107)
CREAT SERPL-MCNC: 0.66 MG/DL (ref 0.67–1.17)
DEPRECATED HCO3 PLAS-SCNC: 29 MMOL/L (ref 22–29)
EGFRCR SERPLBLD CKD-EPI 2021: >90 ML/MIN/1.73M2
GLUCOSE SERPL-MCNC: 141 MG/DL (ref 70–99)
POTASSIUM SERPL-SCNC: 3.6 MMOL/L (ref 3.4–5.3)
SODIUM SERPL-SCNC: 137 MMOL/L (ref 135–145)

## 2023-12-29 PROCEDURE — 36415 COLL VENOUS BLD VENIPUNCTURE: CPT | Performed by: INTERNAL MEDICINE

## 2023-12-29 PROCEDURE — 120N000002 HC R&B MED SURG/OB UMMC

## 2023-12-29 PROCEDURE — 97530 THERAPEUTIC ACTIVITIES: CPT | Mod: GP

## 2023-12-29 PROCEDURE — 99232 SBSQ HOSP IP/OBS MODERATE 35: CPT | Mod: GC | Performed by: ANESTHESIOLOGY

## 2023-12-29 PROCEDURE — 250N000013 HC RX MED GY IP 250 OP 250 PS 637: Performed by: STUDENT IN AN ORGANIZED HEALTH CARE EDUCATION/TRAINING PROGRAM

## 2023-12-29 PROCEDURE — 250N000013 HC RX MED GY IP 250 OP 250 PS 637

## 2023-12-29 PROCEDURE — 250N000011 HC RX IP 250 OP 636: Mod: JZ

## 2023-12-29 PROCEDURE — 250N000013 HC RX MED GY IP 250 OP 250 PS 637: Performed by: PHYSICIAN ASSISTANT

## 2023-12-29 PROCEDURE — 80048 BASIC METABOLIC PNL TOTAL CA: CPT | Performed by: INTERNAL MEDICINE

## 2023-12-29 PROCEDURE — 99232 SBSQ HOSP IP/OBS MODERATE 35: CPT | Performed by: INTERNAL MEDICINE

## 2023-12-29 PROCEDURE — 250N000013 HC RX MED GY IP 250 OP 250 PS 637: Performed by: NURSE PRACTITIONER

## 2023-12-29 PROCEDURE — 97166 OT EVAL MOD COMPLEX 45 MIN: CPT | Mod: GO

## 2023-12-29 PROCEDURE — 97535 SELF CARE MNGMENT TRAINING: CPT | Mod: GO

## 2023-12-29 RX ORDER — BISACODYL 10 MG
10 SUPPOSITORY, RECTAL RECTAL ONCE
Status: COMPLETED | OUTPATIENT
Start: 2023-12-29 | End: 2023-12-29

## 2023-12-29 RX ORDER — OXYCODONE HYDROCHLORIDE 10 MG/1
15-20 TABLET ORAL EVERY 4 HOURS PRN
Qty: 60 TABLET | Refills: 0 | Status: SHIPPED | OUTPATIENT
Start: 2023-12-29 | End: 2024-01-02

## 2023-12-29 RX ORDER — POLYETHYLENE GLYCOL 3350 17 G/17G
17 POWDER, FOR SOLUTION ORAL DAILY
Qty: 510 G | Refills: 0 | Status: SHIPPED | OUTPATIENT
Start: 2023-12-29

## 2023-12-29 RX ADMIN — ACETAMINOPHEN 975 MG: 325 TABLET, FILM COATED ORAL at 15:04

## 2023-12-29 RX ADMIN — OXYCODONE HYDROCHLORIDE 20 MG: 10 TABLET ORAL at 23:49

## 2023-12-29 RX ADMIN — OXYCODONE HYDROCHLORIDE 15 MG: 5 TABLET ORAL at 05:12

## 2023-12-29 RX ADMIN — SENNOSIDES AND DOCUSATE SODIUM 1 TABLET: 50; 8.6 TABLET ORAL at 08:19

## 2023-12-29 RX ADMIN — FAMOTIDINE 20 MG: 20 TABLET ORAL at 08:19

## 2023-12-29 RX ADMIN — CLOPIDOGREL BISULFATE 75 MG: 75 TABLET ORAL at 08:31

## 2023-12-29 RX ADMIN — PREGABALIN 200 MG: 100 CAPSULE ORAL at 21:42

## 2023-12-29 RX ADMIN — CEFAZOLIN SODIUM 2 G: 2 INJECTION, SOLUTION INTRAVENOUS at 05:12

## 2023-12-29 RX ADMIN — ASPIRIN 81 MG CHEWABLE TABLET 81 MG: 81 TABLET CHEWABLE at 08:18

## 2023-12-29 RX ADMIN — LIDOCAINE HYDROCHLORIDE 0.5 MG/KG/HR: 8 INJECTION, SOLUTION INTRAVENOUS at 05:15

## 2023-12-29 RX ADMIN — PREGABALIN 400 MG: 100 CAPSULE ORAL at 08:18

## 2023-12-29 RX ADMIN — ALLOPURINOL 300 MG: 300 TABLET ORAL at 08:19

## 2023-12-29 RX ADMIN — OXYCODONE HYDROCHLORIDE 20 MG: 10 TABLET ORAL at 20:08

## 2023-12-29 RX ADMIN — FOLIC ACID 1 MG: 1 TABLET ORAL at 08:19

## 2023-12-29 RX ADMIN — LEVOTHYROXINE SODIUM 175 MCG: 175 TABLET ORAL at 08:19

## 2023-12-29 RX ADMIN — Medication 1 TABLET: at 08:18

## 2023-12-29 RX ADMIN — ATORVASTATIN CALCIUM 80 MG: 80 TABLET, FILM COATED ORAL at 21:43

## 2023-12-29 RX ADMIN — AMIODARONE HYDROCHLORIDE 200 MG: 200 TABLET ORAL at 08:19

## 2023-12-29 RX ADMIN — POLYETHYLENE GLYCOL 3350 17 G: 17 POWDER, FOR SOLUTION ORAL at 08:22

## 2023-12-29 RX ADMIN — ACETAMINOPHEN 975 MG: 325 TABLET, FILM COATED ORAL at 23:49

## 2023-12-29 RX ADMIN — METHOCARBAMOL 500 MG: 500 TABLET ORAL at 08:19

## 2023-12-29 RX ADMIN — ACETAMINOPHEN 975 MG: 325 TABLET, FILM COATED ORAL at 08:18

## 2023-12-29 RX ADMIN — AMITRIPTYLINE HYDROCHLORIDE 10 MG: 10 TABLET, FILM COATED ORAL at 21:42

## 2023-12-29 RX ADMIN — THIAMINE HCL TAB 100 MG 100 MG: 100 TAB at 08:19

## 2023-12-29 RX ADMIN — METHOCARBAMOL 500 MG: 500 TABLET ORAL at 12:09

## 2023-12-29 RX ADMIN — BISACODYL 10 MG: 10 SUPPOSITORY RECTAL at 15:04

## 2023-12-29 RX ADMIN — FAMOTIDINE 20 MG: 20 TABLET ORAL at 21:43

## 2023-12-29 RX ADMIN — METHOCARBAMOL 500 MG: 500 TABLET ORAL at 15:04

## 2023-12-29 RX ADMIN — METHOCARBAMOL 500 MG: 500 TABLET ORAL at 21:42

## 2023-12-29 ASSESSMENT — ACTIVITIES OF DAILY LIVING (ADL)
ADLS_ACUITY_SCORE: 37

## 2023-12-29 NOTE — PROGRESS NOTES
[facility]    Medicine Progress Note - Hospitalist Service, GOLD TEAM 18    Date of Admission:  12/26/2023    Course reviewed with team, Dr Pierson    Events of today (12/29)  Ongoing back pain, to be assessed by pain service  No c/o chest pain, SOB  Pt experienced urinary incontinence overnight--has occurred after previous spine surgery  No chest pain, cough, SOB  Last BM 12/25  Plan monitor bowel status  PVR  ASA and Plavix resumed today    Assessment & Plan   Emile oWod is a 73 year old male patient with a past medical history significant for persistent atrial fibrillation (cardioversion x 3 attempts), CAD s/p CABG 2002 and s/p PCI to proximal to mid LCx with overlapping 3.5 x 16 mm and 3.0 x 28 mm Synergy BLANCA and OM2 with 3.0 x 12 mm Synergy to OM2 on 3/3/18 in setting of STEMI, diastolic heart failure, severe aortic stenosis s/p 29 mm Hartman Jovany 3 bioprosthetic TAVR 5/26/21, and SSS s/p PPM after TAVR, bleed risk (LLE hematoma after a fall) so 7/13/23 he underwent successful left atrial appendage closure with 35 mm Watchman FLX device. Additionally, history of HTN, HLD, PVD, carotid artery stenosis (<50% stenosis R & L internal carotid arteries 2019), mild 4.2cm fusiform dilation of the ascending thoracic aorta, hypothyroidism, former smoker, gout, alcoholic neuropathy with current alcohol dependence (4 beers per day), cocaine abuse in remission, depression, chronic opiate use, obesity, prediabetes, s/p LTKA, flatback syndrome, chronic low back pain, L2-S1 decompression (2016), TLIF L2-3 (2022) who was admitted to [facility] after undergoing posterior spinal fusion T12-S1, bilateral pelvic fixation on 12/26/23 at [place] with Dr. Zavaleta.    S/p Posterior Spinal Fusion T12-S1 (12/26/23), POD#2  Ortho primary. EBL: 1000cc.    - Management per primary orthopedics team including pain control, activity, antibiotics,   DVT proph mechanical    Lactic Acidosis  post-op, resolved   Lactate 2.4-->3.1-->3.5 intra-op within setting of muscle damage related to procedure. Hemoglobin pre-op 14.7-->13.9 post-op. VBG with normal pH.   - Infection deemed highly unlikely and elevated lactate most likely related to expected muscle injury during procedure with possible component of dehydration.   - Consider additional IVF bolus in PACU, but will defer to primary surgical team and anesthesia team; per primary Orthopedic team, they plan to continue IVF overnight and recheck lactate in the morning.   Lactate this am at 1.5    Persistent Atrial Fibrillation s/p Watchman (7/13/2023)  CAD s/p 2-vessel CABG 2002 (LIMA-LAD, radial-rPDA) and PCI x 2 BLANCA (2018)  Severe Aortic Stenosis s/p TAVR (2021)  SSS s/p PPM (2021)  Chronic Diastolic Heart Failure (EF 60% 4/2023)  HTN and HLD  Last echocardiogram 4/21/23;TAVR with normal valve function, left ventricular ejection fraction is visually estimated at 60%, mild dilation of the aorta is present involving the ascending aorta (maximal dimension 4.4 cm).   - Cardiology requested patient to remain on ASA 81mg .  Aspirin was restarted on POD#2   -  - Continue PTA Amiodarone 200mg daily ( - Continue PTA Lipitor 80mg daily   - PTA Lasix 40mg in am and 20mg in pm on hold in the immediate post-op setting, please resume in 1-2 days pending BP trends.  Lasix was resumed on 12/28  Plavix resumed today, remains on ASA  BMP stable  Amlodipine on hold for now  Plan monitor exam, BMP, BP      Alcohol Dependence  Previously drinking on average 5-6 drinks (beer, wine, hard alcohol). He weaned himself down and completely stopped drinking in preparation for procedure. Reports his last drink was ~2 weeks ago. Has never withdrawn.   - Does not need CIWA scoring    - Folate, B1, multivitamin supplements    Hypothyroidism   - Continue PTA Synthroid    Gout   - Continue PTA allopurinol    Depression   - Continue PTA Amitriptyline          Diet: Advance Diet as Tolerated:  "Regular Diet Adult  Snacks/Supplements Adult: Zachery; With Meals  Discharge Instruction - Regular Diet Adult    DVT Prophylaxis: Pneumatic Compression Devices  Bernal Catheter: Not present  Lines: None     Cardiac Monitoring: None  Code Status: Full Code      Clinically Significant Risk Factors                         # Obesity: Estimated body mass index is 38.34 kg/m  as calculated from the following:    Height as of this encounter: 1.651 m (5' 5\").    Weight as of this encounter: 104.5 kg (230 lb 6.1 oz)., PRESENT ON ADMISSION     # Financial/Environmental Concerns: none   # Pacemaker present       Disposition Plan      Expected Discharge Date: 12/30/2023      Destination: home with family;inpatient rehabilitation facility              Seng Montgomery MD  Hospitalist Service, GOLD TEAM 18  [facility]  Securely message with [employer] (more info)  Text page via [employer] Paging/Directory   See signed in provider for up to date coverage information  ______________________________________________________________________    Interval History   Ongoing c/o back pain  No cough, chest pain, SOB, dizziness  + urinary incontinence as above    Physical Exam   Vital Signs: Temp: 99.3  F (37.4  C) Temp src: Oral BP: 129/46 Pulse: 71   Resp: 26 SpO2: 90 % O2 Device: None (Room air)    Weight: 230 lbs 6.09 oz    General Appearance: Awake, alert, mildly uncomfortable  Respiratory: clear  Cardiovascular: RRR  GI: Soft, non tender. protuberant  Skin: No bruising or bleeding   Other:Awake, alert and orientated X 3   Trace LE edema B      Medical Decision Making       35 MINUTES SPENT BY ME on the date of service doing chart review, history, exam, documentation & further activities per the note.  MANAGEMENT DISCUSSED with the following over the past 24 hours: Patient, bedside RN, primary team and care management        Data     I have personally reviewed the following data over the past 24 " hrs:    N/A  \   N/A   / N/A     137 103 16.7 /  141 (H)   3.6 29 0.66 (L) \

## 2023-12-29 NOTE — PROGRESS NOTES
Care Management Follow Up     Length of Stay (days): 3     Expected Discharge Date: 12/30/2023     Concerns to be Addressed: Discharge planning     Patient plan of care discussed at interdisciplinary rounds: Yes     Anticipated Discharge Disposition: Acute Rehab vs Home     Anticipated Discharge Services: None  Anticipated Discharge DME: Danny     Patient/family educated on Medicare website which has current facility and service quality ratings: No  Education Provided on the Discharge Plan: Yes  Patient/Family in Agreement with the Plan: Yes     Referrals Placed by CM/SW: External Care Coordination  Private pay costs discussed: Not applicable     Additional Information:  Social work met with patient and wife Blessing to update them that patient has recommendations for TCU. Patient and wife appear to be in agreement with this. Informed them that [facility] is following and explained that rehab is across the street from the hospital. The family was okay with this but stated that they would ideally prefer something closer to home. Provided patient and wife with a Medicare Care Compare list from Medicare.gov for the [facility].     Addendum 4:03 PM:  Social work did not have time to stop back at patients room to discuss additional options for TCU placement. Patient will be added to the weekend list for follow up.     Pending:  [facility]  [address] [place] [address]  [address]  P: [phone]  F: [phone]    XOCHILT Orellana, LGSW  5 Med Surg and 10 ICU   [facility]  Phone: [phone]  Pager: [phone]

## 2023-12-29 NOTE — PROGRESS NOTES
Pain Service Progress Note  [facility]  Date: 12/29/2023       Patient Name: Emile Wood  MRN: [id number]  Age: 73 year old  Sex: male      Assessment:  Emile Wood is a 73 year old male patient with a past medical history significant for persistent atrial fibrillation (cardioversion x 3 attempts), CAD s/p CABG 2002 and s/p PCI to proximal to mid LCx with overlapping 3.5 x 16 mm and 3.0 x 28 mm Synergy BLANCA and OM2 with 3.0 x 12 mm Synergy to OM2 on 3/3/18 in setting of STEMI, diastolic heart failure, severe aortic stenosis s/p 29 mm Hartman Jovany 3 bioprosthetic TAVR 5/26/21, and SSS s/p PPM after TAVR, bleed risk (LLE hematoma after a fall) so 7/13/23 he underwent successful left atrial appendage closure with 35 mm Watchman FLX device. Additionally, history of HTN, HLD, PVD, carotid artery stenosis, mild 4.2cm fusiform dilation of the ascending thoracic aorta, hypothyroidism, former smoker, gout, alcoholic neuropathy with current alcohol dependence (4 beers per day), depression, chronic opiate use, obesity, prediabetes, s/p LTKA, flatback syndrome, chronic low back pain, L2-S1 decompression (2016), TLIF L2-3 (2022) who was admitted to [facility] after undergoing posterior spinal fusion T12-S1, bilateral pelvic fixation on 12/26/23 at [place] with Dr. Zavaleta.      Plan/Recommendations:  - Continue amitriptyline 10 mg nightly  - Acetaminophen 975mg TID  - Continue oxycodone 15-20mg every 3 hours as needed. Wean back to home Percocet dosing as able.  - Continue pregabalin 400 in the morning and 200 mg at night   - Stop lidocaine infusion.  - Continue Dilaudid 0.4 IV every 2 hours as needed for breakthrough pain (try p.o. at first)  - Continue Robaxin 500 mg 4 times daily    Pain Service will sign off.    Discussed with attending anesthesiologist    Issa eCron MD  12/29/2023     Overnight Events: No acute overnight events      Subjective: Still reports pain in his  "lower back making it difficult to move around. Siting or laying still his pain is about 7/10. During therapies his pain increases to 8/10.  Nausea: No  Vomiting: No  Pruritus: No  Symptoms of LAST: No    Pain Location:  Lower back.    Diet: Advance Diet as Tolerated: Regular Diet Adult  Snacks/Supplements Adult: Zachery; With Meals    Relevant Labs:  Recent Labs   Lab Test 12/29/23  0632 12/27/23  0619 12/26/23  0642   INR  --   --  1.05   PLT  --   --  259   PTT  --   --  32   BUN 16.7   < > 20.6    < > = values in this interval not displayed.       Physical Exam:  Vitals: /46 (BP Location: Left arm)   Pulse 82   Temp 37.6  C (99.7  F) (Oral)   Resp 26   Ht 1.651 m (5' 5\")   Wt 104.5 kg (230 lb 6.1 oz)   SpO2 90%   BMI 38.34 kg/m      Physical Exam:   CONSTITUTIONAL/GENERAL APPEARANCE:  NAD. Laying in bed supine.  RESPIRATORY: non-labored breathing. No cough, wheeze  CV: RRR  MUSCULOSKELETAL/BACK/SPINE/EXTREMITIES: Moves all extremities purposefully.  NEURO: Alert and Oriented x3. Answers questions appropriately. 5/5 strength throughout lower extremities except 4/5 strength with L flexion.    Relevant Medications:  Current Pain Medications:  Medications related to Pain Management (From now, onward)      Start     Dose/Rate Route Frequency Ordered Stop    12/28/23 1500  acetaminophen (TYLENOL) tablet 975 mg         975 mg Oral EVERY 8 HOURS 12/28/23 1104      12/28/23 1400  oxyCODONE (ROXICODONE) tablet 15 mg        See Hyperspace for full Linked Orders Report.    15 mg Oral EVERY 4 HOURS PRN 12/28/23 1056      12/28/23 1400  oxyCODONE IR (ROXICODONE) tablet 20 mg        See Hyperspace for full Linked Orders Report.    20 mg Oral EVERY 4 HOURS PRN 12/28/23 1056      12/28/23 1200  methocarbamol (ROBAXIN) tablet 500 mg         500 mg Oral 4 TIMES DAILY 12/28/23 1012      12/27/23 2200  amitriptyline (ELAVIL) tablet 10 mg        Note to Pharmacy: PTA Sig:Take 10 mg by mouth at bedtime      10 mg Oral AT " "BEDTIME 12/26/23 2038 12/27/23 1200  aspirin (ASA) chewable tablet 81 mg         81 mg Oral DAILY 12/27/23 0735      12/27/23 0800  polyethylene glycol (MIRALAX) Packet 17 g         17 g Oral DAILY 12/26/23 2038 12/27/23 0800  pregabalin (LYRICA) capsule 400 mg        Note to Pharmacy: PTA Sig:Take 200-400 mg by mouth 2 times daily Take 2 capsules in the morning and  one capsule in the evening      400 mg Oral EVERY MORNING 12/26/23 2038 12/26/23 2100  senna-docusate (SENOKOT-S/PERICOLACE) 8.6-50 MG per tablet 1 tablet         1 tablet Oral 2 TIMES DAILY 12/26/23 2038 12/26/23 2100  pregabalin (LYRICA) capsule 200 mg         200 mg Oral EVERY EVENING 12/26/23 2038 12/26/23 2038  magnesium hydroxide (MILK OF MAGNESIA) suspension 30 mL         30 mL Oral DAILY PRN 12/26/23 2038 12/26/23 2038  bisacodyl (DULCOLAX) suppository 10 mg         10 mg Rectal DAILY PRN 12/26/23 2038 12/26/23 2038  HYDROmorphone (DILAUDID) injection 0.2 mg        See Hyperspace for full Linked Orders Report.    0.2 mg Intravenous EVERY 2 HOURS PRN 12/26/23 2038 12/26/23 2038  HYDROmorphone (DILAUDID) injection 0.4 mg        See Hyperspace for full Linked Orders Report.    0.4 mg Intravenous EVERY 2 HOURS PRN 12/26/23 2038 12/26/23 2038  hydrOXYzine HCl (ATARAX) tablet 10 mg         10 mg Oral EVERY 6 HOURS PRN 12/26/23 2038 12/26/23 2038  lidocaine 1 % 0.1-1 mL         0.1-1 mL Other EVERY 1 HOUR PRN 12/26/23 2038 12/26/23 2038  lidocaine (LMX4) cream          Topical EVERY 1 HOUR PRN 12/26/23 2038              Primary Service Contacted with Recommendations? Yes      Please see A&P for additional details of medical decision making.      Acute Inpatient Pain Service [facility]  Hours of pain coverage 24/7   Page via Amcom- Please Page the Pain Team Via Amcom: \"PAIN MANAGEMENT ACUTE INPATIENT/ [facility]\"             "

## 2023-12-29 NOTE — PROGRESS NOTES
"/52 (BP Location: Left arm)   Pulse 67   Temp 100.2  F (37.9  C) (Oral)   Resp 18   Ht 1.651 m (5' 5\")   Wt 104.5 kg (230 lb 6.1 oz)   SpO2 93%   BMI 38.34 kg/m      Pt AO x4, on RA and sating adequately. Minimal confusion noted this morning. Tele discontinued. Surgical wound dressing clean, dry, and intact. Patient on Lidocaine drip @ 0.5 mg/kg/hr. Neuro checks done.   Pt had a good day visiting with spouse. No new events noted or reported. Continue to monitor and follow the POC.  "

## 2023-12-29 NOTE — PROGRESS NOTES
12/29/23 0810   Appointment Info   Signing Clinician's Name / Credentials (OT) Carissa Quintana, OTR/L   Living Environment   People in Home spouse   Current Living Arrangements house   Home Accessibility stairs to enter home;stairs within home   Number of Stairs, Main Entrance 3   Stair Railings, Main Entrance railing on left side (ascending)   Number of Stairs, Within Home, Primary other (see comments)  (split level)   Stair Railings, Within Home, Primary railings on both sides of stairs   Living Environment Comments Pt lives in a split level home with bedroom on upper level and BR with WIS on lower level. Pt reports that his FWW does not fit through his door frames in his house.   Self-Care   Usual Activity Tolerance moderate   Current Activity Tolerance fair   Equipment Currently Used at Home grab bar, toilet;grab bar, tub/shower;raised toilet seat;shower chair;walker, standard;other (see comments)  (FWW and walking stick, reacher, sock aid, long handle shoe horn)   Fall history within last six months no   Activity/Exercise/Self-Care Comment Pt reports that he is ind with I/ADLs at baseline although it takes a long time and his wife is able to assist as needed   General Information   Onset of Illness/Injury or Date of Surgery 12/26/23   Referring Physician Dr. Zavaleta   Patient/Family Therapy Goal Statement (OT) to get well   Additional Occupational Profile Info/Pertinent History of Current Problem per chart, 73 year old male patient with a past medical history significant for persistent atrial fibrillation (cardioversion x 3 attempts), CAD s/p CABG 2002 and s/p PCI to proximal to mid LCx with overlapping 3.5 x 16 mm and 3.0 x 28 mm Synergy BLANCA and OM2 with 3.0 x 12 mm Synergy to OM2 on 3/3/18 in setting of STEMI, diastolic heart failure, severe aortic stenosis s/p 29 mm Hartman Jovany 3 bioprosthetic TAVR 5/26/21, and SSS s/p PPM after TAVR, bleed risk (LLE hematoma after a fall) so 7/13/23 he underwent  successful left atrial appendage closure with 35 mm Watchman FLX device. Additionally, history of HTN, HLD, PVD, carotid artery stenosis (<50% stenosis R & L internal carotid arteries 2019), mild 4.2cm fusiform dilation of the ascending thoracic aorta, hypothyroidism, former smoker, gout, alcoholic neuropathy with current alcohol dependence (4 beers per day), cocaine abuse in remission, depression, chronic opiate use, obesity, prediabetes, s/p LTKA, flatback syndrome, chronic low back pain, L2-S1 decompression (2016), TLIF L2-3 (2022) who was admitted to [facility] after undergoing posterior spinal fusion T12-S1, bilateral pelvic fixation on 12/26/23 at [place] with Dr. Zavaleta.   Existing Precautions/Restrictions spinal   Left Upper Extremity (Weight-bearing Status) other (see comments)  (10#)   Right Upper Extremity (Weight-bearing Status) other (see comments)  (10#)   Left Lower Extremity (Weight-bearing Status) weight-bearing as tolerated (WBAT)   Right Lower Extremity (Weight-bearing Status) weight-bearing as tolerated (WBAT)   Cognitive Status Examination   Orientation Status orientation to person, place and time   Affect/Mental Status (Cognitive) WFL   Follows Commands WFL   Visual Perception   Visual Impairment/Limitations WFL   Sensory   Sensory Quick Adds bilateral LE   Sensory Comments n/t in B feet at baseline   Pain Assessment   Patient Currently in Pain Yes, see Vital Sign flowsheet   Posture   Posture not impaired   Range of Motion Comprehensive   General Range of Motion no range of motion deficits identified   Strength Comprehensive (MMT)   Comment, General Manual Muscle Testing (MMT) Assessment generalized weakness   Muscle Tone Assessment   Muscle Tone Quick Adds No deficits were identified   Coordination   Upper Extremity Coordination No deficits were identified   Coordination Comments noted some BUE shakiness   Bed Mobility   Bed Mobility supine-sit   Supine-Sit Nov[place] (Bed Mobility)  minimum assist (75% patient effort)   Transfers   Transfers bed-chair transfer;sit-stand transfer;toilet transfer   Transfer Skill: Bed to Chair/Chair to Bed   Bed-Chair [place] (Transfers) contact guard   Sit-Stand Transfer   Sit-Stand [place] (Transfers) contact guard   Toilet Transfer   [place] Level (Toilet Transfer) contact guard   Balance   Balance Assessment standing dynamic balance;standing static balance   Activities of Daily Living   BADL Assessment/Intervention lower body dressing;toileting   Lower Body Dressing Assessment/Training   [place] Level (Lower Body Dressing) maximum assist (25% patient effort)   Toileting   [place] Level (Toileting) maximum assist (25% patient effort)   Clinical Impression   Criteria for Skilled Therapeutic Interventions Met (OT) Yes, treatment indicated   OT Diagnosis decreased ADL ind   Influenced by the following impairments s/p spinal surgery   OT Problem List-Impairments impacting ADL problems related to;activity tolerance impaired;balance;pain;strength;post-surgical precautions   Assessment of Occupational Performance 3-5 Performance Deficits   Identified Performance Deficits dressing, bed mob, toileting, showering, func mob   Planned Therapy Interventions (OT) ADL retraining   Clinical Decision Making Complexity (OT) detailed assessment/moderate complexity   Risk & Benefits of therapy have been explained evaluation/treatment results reviewed;patient   OT Total Evaluation Time   OT Eval, Moderate Complexity Minutes (85546) 5   OT Goals   Therapy Frequency (OT) 5 times/week   OT Predicted Duration/Target Date for Goal Attainment 01/12/24   OT Goals Hygiene/Grooming;Lower Body Dressing;Lower Body Bathing;Bed Mobility;Transfers;Toilet Transfer/Toileting   OT: Hygiene/Grooming supervision/stand-by assist;within precautions;while standing   OT: Lower Body Dressing Supervision/stand-by assist;using adaptive equipment;within precautions   OT: Lower Body  "Bathing Supervision/stand-by assist;using adaptive equipment;with precautions   OT: Bed Mobility Supervision/stand-by assist;supine to/from sitting;within precautions   OT: Transfer Supervision/stand-by assist;with assistive device;within precautions   OT: Toilet Transfer/Toileting Supervision/stand-by assist;using adaptive equipment;within precautions   Interventions   Interventions Quick Adds Self-Care/Home Management   Self-Care/Home Management   Self-Care/Home Mgmt/ADL, Compensatory, Meal Prep Minutes (44451) 30   Symptoms Noted During/After Treatment (Meal Preparation/Planning Training) fatigue;increased pain   Treatment Detail/Skilled Intervention Pt supine in bed upon OT arrival and agreeable to therapy.Educ on role of OT. Pt able to recall 3/3 spinal precautions, pt states \"I don't understanding what n o twisting is\". Educ on no twisting and examples of twisting, demo by OT. Educ on safe bed mob and postioning. Min A for supine to sit bed mob with elevated HOB, bed rails and log roll tech. Educ on safe transfers with FWW and hand placement. CGA for STS from EOB,STS from toilet x2 and STS from chair with FWW. v/c for safety and hand placement. Pt Min A for ambulation bed>chair>BR>chair with FWW. Pt experienced posterior LOB when entering the BR with FWW, partially corrected by OT. Pt Max A for toileting with A for clothing management and v/c for positioning on toilet. Pt reports concern for voiding without making a mess d/t penial concerns. Educ on sitting on the toilet vs standing with the urinal and FWW. Attempted both on toilet and standing with urinal with Max A for urinal placement. Pt Max A for LB dressing to doff socks with reacher and don socks with sock aid. Increased time and effort needed t/o session d/t pain and fatigue. Pt left in chair with needs in reach.   OT Discharge Planning   OT Plan LB dressing with AE, shower transfer w/ shower chair, bed mob, sitting>standing tolerance as able, " posterior pericare w/in spinal prec.   OT Discharge Recommendation (DC Rec) Transitional Care Facility   OT Rationale for DC Rec Pt is below ADL baseline and would benefit from continued skilled OT and TCU stay to increase ADL ind, act tolerance and safety.   OT Brief overview of current status Max A for toileting and dressing, Min A for bed mob and func mob with LOBx1   Total Session Time   Timed Code Treatment Minutes 30   Total Session Time (sum of timed and untimed services) 35

## 2023-12-29 NOTE — PLAN OF CARE
Goal Outcome Evaluation:       A/O x4, forgetful and confused. Calm and cooperative. VSS & on tele. Stable on RA. Pt denies chest pain, SOB, N/V. Baseline numbness and tingling BLE. L-PIV-SL. A-PJC-bqzcyjlo Lidocaine drib. Ortho provider stated to discontinue the infusing. Pt was assessed the signs or symptoms of toxicity of lidocaine, pt had no evidence of toxicity. Ax1, walker, and gb-w/weakness BLE. On regular diet, thin liquid, take pills wholes. Pt was up to the bathroom x3, has urinary urgency. LBM 12/25, active bowel sounds, passing flatus. Pt c/o back incision site, and rated his pain 9/10. Pain was managed with oxy, tylenol, robaxin, and LYRICA. Surgiacl site dressing C/D/I. No drains. Encouraged IS, pt tolerated well. Sleep and rest promoted. Frequent visualization is ongoing. Sleep and rest promoted. Bed alarm is on. Call light within reach and able to make needs known. Continue plan of care.

## 2023-12-29 NOTE — PROGRESS NOTES
"Orthopaedic Surgery Progress Note:  12/29/2023     Subjective:   NAEO. AFVSS. Pain continues. Oxycodone beneficial however pain returns prior to next dose. Has been ambulating. Drain removed yesterday. Bernal removed, voiding. Tolerating diet.    Patient reports two episodes overnight of urinary incontinence due to urgency. He reports consistently sensation is intact. Patient's wife mentions that 1 year ago, after a previous spine surgery, this phenomenon occurred and spontaneously improved with time.     Objective:   /46 (BP Location: Left arm)   Pulse 82   Temp 99.7  F (37.6  C) (Oral)   Resp 26   Ht 1.651 m (5' 5\")   Wt 104.5 kg (230 lb 6.1 oz)   SpO2 90%   BMI 38.34 kg/m    No intake/output data recorded.  Gen: NAD. Resting comfortably in bed  Resp: Breathing comfortably on RA  Drain:   Drain output of 0/0 at 24/7 hours, respectively.  Musculoskeletal: dressing c/d/I.    Lower extremities:  Motor Strength Right Left   Hip flexion: L1, L2, L3 5/5 5/5   Hip adduction: L2, L3 5/5 5/5   Knee flexion: S1 5/5 5/5   Knee extension: L3, L4 5/5 5/5   Ankle dosiflexion: L4, L5 5/5 5/5   EHL: L5 5/5 5/5   Ankle plantarflexion: S1 5/5 5/5     Sensation from L1-S2 is preserved although decreased at baseline 2/2 to neuropathy.     Labs:  Lab Results   Component Value Date    WBC 7.4 12/26/2023    HGB 10.8 (L) 12/27/2023     12/26/2023    INR 1.05 12/26/2023        Assessment & Plan:   Emile Wood is a 73 year old male with now s/p PSIF T12-S1 on 12/26 with Dr. Zavaleta.     Goals for today:  -PT  -XRs  -Pain control  -monitor urinary incontinence    Ortho Primary  Activity: Up with assist until independent. No excessive bending or twisting. No lifting >10 lbs x 6 weeks. No Sharri lift for transfers.   Weight bearing status: WBAT.  Pain management: Transition from IV to PO as tolerated.    Antibiotics: Ancef completed.  Diet: Begin with clear fluids and progress diet as tolerated. Zachery nutritional " supplement twice daily   DVT prophylaxis: SCDs only. PTA ASA, and plan to restart plavix today (12/28), both have been ordered  Imaging: XR Upright L spine PTDC - ordered.  Labs: hgb pod1 - 10.8  Bracing/Splinting: None.  Dressings: Keep clean, dry and intact. Patient may shower with bandage in place. Dressing to be removed 2 weeks after surgery. Please change or reinforce as necessary for strikethrough or saturation.   Drains: Document output per shift, will be discontinued at Orthopedic Surgery discretion.  Bernal catheter: Removed POD2  Physical Therapy/Occupational Therapy: Eval and treat.  Consults: Hospitalist.  Follow-up: Clinic with Dr. Zavaleta in 6 weeks with repeat x-rays.   Disposition: Pending progress with therapies, pain control on orals, post-op imaging, and drain removal.     Orthopaedics surgery staff for this patient is Dr. Zavaleta.     ------------------------------------------------------------------------------------------  [  x ] Drains removed.  [   ] Post xrays complete  [ x ] Discharge instruction orders and opioid signed.  [  ] Discharge summary started.     Diego Fofana MD  Orthopedic Surgery, PGY-4    FOLLOWUP:    Future Appointments   Date Time Provider Department [place]   12/27/2023  2:30 PM Geni Faith, PT URPT [place]   12/27/2023  4:30 PM UR OT WAITLIST UROT [place]   2/7/2024 11:20 AM Nacho Zavaleta MD [facility]

## 2023-12-30 ENCOUNTER — APPOINTMENT (OUTPATIENT)
Dept: PHYSICAL THERAPY | Facility: CLINIC | Age: 73
DRG: 456 | End: 2023-12-30
Attending: ORTHOPAEDIC SURGERY
Payer: MEDICARE

## 2023-12-30 ENCOUNTER — APPOINTMENT (OUTPATIENT)
Dept: OCCUPATIONAL THERAPY | Facility: CLINIC | Age: 73
DRG: 456 | End: 2023-12-30
Attending: ORTHOPAEDIC SURGERY
Payer: MEDICARE

## 2023-12-30 LAB
ANION GAP SERPL CALCULATED.3IONS-SCNC: 9 MMOL/L (ref 7–15)
BUN SERPL-MCNC: 16.8 MG/DL (ref 8–23)
CALCIUM SERPL-MCNC: 8.2 MG/DL (ref 8.8–10.2)
CHLORIDE SERPL-SCNC: 101 MMOL/L (ref 98–107)
CREAT SERPL-MCNC: 0.64 MG/DL (ref 0.67–1.17)
DEPRECATED HCO3 PLAS-SCNC: 26 MMOL/L (ref 22–29)
EGFRCR SERPLBLD CKD-EPI 2021: >90 ML/MIN/1.73M2
GLUCOSE SERPL-MCNC: 161 MG/DL (ref 70–99)
MAGNESIUM SERPL-MCNC: 2.1 MG/DL (ref 1.7–2.3)
PHOSPHATE SERPL-MCNC: 2.6 MG/DL (ref 2.5–4.5)
POTASSIUM SERPL-SCNC: 3.8 MMOL/L (ref 3.4–5.3)
SODIUM SERPL-SCNC: 136 MMOL/L (ref 135–145)

## 2023-12-30 PROCEDURE — 250N000013 HC RX MED GY IP 250 OP 250 PS 637: Performed by: NURSE PRACTITIONER

## 2023-12-30 PROCEDURE — 250N000013 HC RX MED GY IP 250 OP 250 PS 637

## 2023-12-30 PROCEDURE — 80048 BASIC METABOLIC PNL TOTAL CA: CPT | Performed by: INTERNAL MEDICINE

## 2023-12-30 PROCEDURE — 97116 GAIT TRAINING THERAPY: CPT | Mod: GP

## 2023-12-30 PROCEDURE — 36415 COLL VENOUS BLD VENIPUNCTURE: CPT | Performed by: INTERNAL MEDICINE

## 2023-12-30 PROCEDURE — 120N000002 HC R&B MED SURG/OB UMMC

## 2023-12-30 PROCEDURE — 84100 ASSAY OF PHOSPHORUS: CPT | Performed by: ORTHOPAEDIC SURGERY

## 2023-12-30 PROCEDURE — 97535 SELF CARE MNGMENT TRAINING: CPT | Mod: GO | Performed by: OCCUPATIONAL THERAPIST

## 2023-12-30 PROCEDURE — 83735 ASSAY OF MAGNESIUM: CPT | Performed by: ORTHOPAEDIC SURGERY

## 2023-12-30 PROCEDURE — 250N000013 HC RX MED GY IP 250 OP 250 PS 637: Performed by: PHYSICIAN ASSISTANT

## 2023-12-30 PROCEDURE — 97530 THERAPEUTIC ACTIVITIES: CPT | Mod: GP

## 2023-12-30 PROCEDURE — 250N000013 HC RX MED GY IP 250 OP 250 PS 637: Performed by: STUDENT IN AN ORGANIZED HEALTH CARE EDUCATION/TRAINING PROGRAM

## 2023-12-30 RX ADMIN — FAMOTIDINE 20 MG: 20 TABLET ORAL at 20:41

## 2023-12-30 RX ADMIN — SENNOSIDES AND DOCUSATE SODIUM 1 TABLET: 50; 8.6 TABLET ORAL at 09:05

## 2023-12-30 RX ADMIN — CLOPIDOGREL BISULFATE 75 MG: 75 TABLET ORAL at 09:05

## 2023-12-30 RX ADMIN — AMITRIPTYLINE HYDROCHLORIDE 10 MG: 10 TABLET, FILM COATED ORAL at 22:09

## 2023-12-30 RX ADMIN — OXYCODONE HYDROCHLORIDE 20 MG: 10 TABLET ORAL at 22:09

## 2023-12-30 RX ADMIN — THIAMINE HCL TAB 100 MG 100 MG: 100 TAB at 09:06

## 2023-12-30 RX ADMIN — SENNOSIDES AND DOCUSATE SODIUM 1 TABLET: 50; 8.6 TABLET ORAL at 20:41

## 2023-12-30 RX ADMIN — ASPIRIN 81 MG CHEWABLE TABLET 81 MG: 81 TABLET CHEWABLE at 09:06

## 2023-12-30 RX ADMIN — FOLIC ACID 1 MG: 1 TABLET ORAL at 09:06

## 2023-12-30 RX ADMIN — LEVOTHYROXINE SODIUM 175 MCG: 175 TABLET ORAL at 09:06

## 2023-12-30 RX ADMIN — ACETAMINOPHEN 975 MG: 325 TABLET, FILM COATED ORAL at 16:01

## 2023-12-30 RX ADMIN — METHOCARBAMOL 500 MG: 500 TABLET ORAL at 20:41

## 2023-12-30 RX ADMIN — PREGABALIN 400 MG: 100 CAPSULE ORAL at 09:05

## 2023-12-30 RX ADMIN — ACETAMINOPHEN 975 MG: 325 TABLET, FILM COATED ORAL at 09:06

## 2023-12-30 RX ADMIN — AMIODARONE HYDROCHLORIDE 200 MG: 200 TABLET ORAL at 09:05

## 2023-12-30 RX ADMIN — POLYETHYLENE GLYCOL 3350 17 G: 17 POWDER, FOR SOLUTION ORAL at 09:08

## 2023-12-30 RX ADMIN — ATORVASTATIN CALCIUM 80 MG: 80 TABLET, FILM COATED ORAL at 22:09

## 2023-12-30 RX ADMIN — OXYCODONE HYDROCHLORIDE 20 MG: 10 TABLET ORAL at 06:14

## 2023-12-30 RX ADMIN — PREGABALIN 200 MG: 100 CAPSULE ORAL at 20:41

## 2023-12-30 RX ADMIN — Medication 1 TABLET: at 09:06

## 2023-12-30 RX ADMIN — METHOCARBAMOL 500 MG: 500 TABLET ORAL at 16:02

## 2023-12-30 RX ADMIN — OXYCODONE HYDROCHLORIDE 20 MG: 10 TABLET ORAL at 13:04

## 2023-12-30 RX ADMIN — FAMOTIDINE 20 MG: 20 TABLET ORAL at 09:05

## 2023-12-30 RX ADMIN — ALLOPURINOL 300 MG: 300 TABLET ORAL at 09:06

## 2023-12-30 RX ADMIN — METHOCARBAMOL 500 MG: 500 TABLET ORAL at 09:05

## 2023-12-30 ASSESSMENT — ACTIVITIES OF DAILY LIVING (ADL)
ADLS_ACUITY_SCORE: 37
ADLS_ACUITY_SCORE: 37
ADLS_ACUITY_SCORE: 38
ADLS_ACUITY_SCORE: 37
ADLS_ACUITY_SCORE: 38
ADLS_ACUITY_SCORE: 37
ADLS_ACUITY_SCORE: 37
ADLS_ACUITY_SCORE: 38
ADLS_ACUITY_SCORE: 37

## 2023-12-30 NOTE — PROGRESS NOTES
"Orthopaedic Surgery Progress Note:  12/30/2023     Subjective:   On my arrival this morning I found Emile in the bathroom on his hands and knees next to the toilet with his walker underneath him. He says he missed the toilet and slipped but caught himself. Did not hit his head or injure himself as far as he can tell. We were able to get him lifted into bed with a crys. Has been confused and says he was rushing to get ready for OT when he slipped.    Objective:   BP 93/74 (BP Location: Left arm)   Pulse 101   Temp 100  F (37.8  C) (Oral)   Resp 20   Ht 1.651 m (5' 5\")   Wt 104.5 kg (230 lb 6.1 oz)   SpO2 (!) 84%   BMI 38.34 kg/m    No intake/output data recorded.  Gen: NAD.  Resp: Breathing comfortably on RA  Drain:   Musculoskeletal: dressing c/d/I.    Lower extremities:  Motor Strength Right Left   Hip flexion: L1, L2, L3 5/5 5/5   Hip adduction: L2, L3 5/5 5/5   Knee flexion: S1 5/5 5/5   Knee extension: L3, L4 5/5 5/5   Ankle dosiflexion: L4, L5 5/5 5/5   EHL: L5 5/5 5/5   Ankle plantarflexion: S1 5/5 5/5     Sensation from L1-S2 is preserved although decreased at baseline 2/2 to neuropathy.     Labs:  Lab Results   Component Value Date    WBC 7.4 12/26/2023    HGB 10.8 (L) 12/27/2023     12/26/2023    INR 1.05 12/26/2023        Assessment & Plan:   Emile Wood is a 73 year old male with now s/p PSIF T12-S1 on 12/26 with Dr. Zavaleta.     Goals for today:  -If any concerns for new injury please contact ortho on call  - Bed alarm, therapy as able  -Pain control  -Bernal removal    Ortho Primary  Activity: Up with assist until independent. No excessive bending or twisting. No lifting >10 lbs x 6 weeks. No Crys lift for transfers.   Weight bearing status: WBAT.  Pain management: Transition from IV to PO as tolerated.    Antibiotics: Ancef until drains are removed.  Diet: Begin with clear fluids and progress diet as tolerated. Zachery nutritional supplement twice daily   DVT prophylaxis: SCDs " only. Preop ASA and plavix.  Imaging: XR Upright L spine PTDC - ordered.  Labs: hgb pod1 pending  Bracing/Splinting: None.  Dressings: Keep clean, dry and intact. Patient may shower with bandage in place. Dressing to be removed 2 weeks after surgery. Please change or reinforce as necessary for strikethrough or saturation.   Drains: Document output per shift, will be discontinued at Orthopedic Surgery discretion.  Bernal catheter: Removed POD#1.   Physical Therapy/Occupational Therapy: Eval and treat.  Consults: Hospitalist.  Follow-up: Clinic with Dr. Zavaleta in 6 weeks with repeat x-rays.   Disposition: Pending progress with therapies, pain control on orals, post-op imaging, and drain removal.     Orthopaedics surgery staff for this patient is Dr. Zavaleta.     ------------------------------------------------------------------------------------------     [  x ] Drains removed.  [   ] Post xrays done.  [   ] Discharge orders done.  [   ] Discharge summary started.     Jad Hamlin MD  Orthopaedic Surgery PGY-4    FOLLOWUP:    Future Appointments   Date Time Provider Department Center   12/27/2023  2:30 PM Geni Faith, PT URPT [place]   12/27/2023  4:30 PM UR OT WAITLIST UROT [place]   2/7/2024 11:20 AM Nacho Zavaleta MD [facility]

## 2023-12-30 NOTE — PROGRESS NOTES
Care Management Follow Up    Length of Stay (days): 4    Expected Discharge Date: 12/30/2023     Concerns to be Addressed: discharge planning     Patient plan of care discussed at interdisciplinary rounds: Yes    Anticipated Discharge Disposition: Acute Rehab, TCU     Anticipated Discharge Services: None  Anticipated Discharge DME: Danny    Patient/family educated on Medicare website which has current facility and service quality ratings: yes  Education Provided on the Discharge Plan: Yes  Patient/Family in Agreement with the Plan: yes    Referrals Placed by CM/SW: External Care Coordination, TCUs (see below)  Private pay costs discussed: Not applicable    Additional Information:  SW met with pt, spouse Blessing and son Hood. Discussed medicare.gov ratings and referrals, including that pt and family would like private room, they accept that this will be an additional fee of around $50/day. Reviewed Medicare A SNF benefits, including when Medicare will stop paying for TCU Stay.     Pt may not recall writer's visit as he was falling asleep during conversation.    SW initiated referrals to following facilities:    [facility]  [address] [place] [address]  [phone] f:[phone]    [facility]  [address] [place] [address]  [phone] f:[phone]    [facility][1]  [address] [place] [address]  [phone] f:[phone]    [facility]  [address] [place] [address]  [phone] f: [phone]    [facility]  [address] [place] [address]  [phone] f:[phone]    [facility]  [address] [place] [address]  [phone] f:[phone]      Await assessment response, SW con't to follow for discharge planning.    ALEXIS Carrillo, MSW        Social Work and Care Management Department       SEARCHABLE in [employer] - search [employer] WORK     [place] ([phone] Saturday and  Sunday)    Units: 4A, 4C, 4E   Pager #6: [phone] Units: 6A & 6B  Pager #2: [phone] Units: 7A &7B   Pager #4: [phone]   Units: 5A, 5B, & 5C  Pager #1: [phone] Units: 6C & 6D  Pager #3: [phone] Units: 7C & 7D  Pager #5: [phone]   Unit: [place] ED  Pager: [phone] (page copies to ED SW staff)     [facility] ([phone]) Saturday and Sunday     Units: 5 Ortho, 5 Med/Surg & WB ED  Pager #7: [phone] Units: 6 Med/Surg, 8A, 10A ICU  Pager #8: [phone]     After hours (1630 - 0000) Saturday & Sunday; ([phone]) Mon-Fri; (2777-3046) [facility] Recognized Holidays    Units: ALL  Pager: [phone]

## 2023-12-30 NOTE — PLAN OF CARE
[id number]    Pt Aox4 at start of shift. When aides went in to take vitals at 0000, pt was sitting on side of bed and said he wanted to go get a beer. Pt thought he was in his own house and that his wife was upstairs. Unable to reorient to environment but eventually laid down and fell asleep. Up Ax1 with GB and walker. Voiding spontaneously with occasional urgency. Medium, loose BM this shift. Spinal dressing CDI. Lidocaine discontinued this shift. Pain managed with scheduled robaxin and prn oxy. Baseline numbness to BLE. Trace edema to BLE. R and L PIVs SL. Continue POC.     Pt continues to be confused waking up this morning, says he is in an office and is worried about a meeting with some woman today. Unsure what his baseline is.

## 2023-12-30 NOTE — PROGRESS NOTES
[facility]    Medicine Progress Note - Hospitalist Service, GOLD TEAM 18    Date of Admission:  12/26/2023    Course reviewed with team    Events of today (12/30)  Fall in bathroom, evaluated by spine team  Intermittent confusion, likely secondary to opiods  Mild SOB--02 sats mid 80s --Pt adamant he will not take furosemide secondary to difficulty managing bladder  Last BM 12/29  Off lidocaine drip  Plan monitor bowel status  ASA and Plavix have been resumed    Assessment & Plan   Emile Wood is a 73 year old male patient with a past medical history significant for persistent atrial fibrillation (cardioversion x 3 attempts), CAD s/p CABG 2002 and s/p PCI to proximal to mid LCx with overlapping 3.5 x 16 mm and 3.0 x 28 mm Synergy BLANCA and OM2 with 3.0 x 12 mm Synergy to OM2 on 3/3/18 in setting of STEMI, diastolic heart failure, severe aortic stenosis s/p 29 mm Hartman Jovany 3 bioprosthetic TAVR 5/26/21, and SSS s/p PPM after TAVR, bleed risk (LLE hematoma after a fall) so 7/13/23 he underwent successful left atrial appendage closure with 35 mm Watchman FLX device. Additionally, history of HTN, HLD, PVD, carotid artery stenosis (<50% stenosis R & L internal carotid arteries 2019), mild 4.2cm fusiform dilation of the ascending thoracic aorta, hypothyroidism, former smoker, gout, alcoholic neuropathy with current alcohol dependence (4 beers per day), cocaine abuse in remission, depression, chronic opiate use, obesity, prediabetes, s/p LTKA, flatback syndrome, chronic low back pain, L2-S1 decompression (2016), TLIF L2-3 (2022) who was admitted to [facility] after undergoing posterior spinal fusion T12-S1, bilateral pelvic fixation on 12/26/23 at [place] [facility] with Dr. Zavaleta.    S/p Posterior Spinal Fusion T12-S1 (12/26/23), POD#2  Ortho primary. EBL: 1000cc.    - Management per primary orthopedics team including pain control, activity, antibiotics,   DVT proph  mechanical    Intermittent confusion  C/w metabolic encephalopathy, likely secondary to medications, surgery, disrupted sleep  Plan monitor mental status, reduce oxycodone as possible. Discontinue hydroxyzine    Persistent Atrial Fibrillation s/p Watchman (7/13/2023)  CAD s/p 2-vessel CABG 2002 (LIMA-LAD, radial-rPDA) and PCI x 2 BLANCA (2018)  Severe Aortic Stenosis s/p TAVR (2021)  SSS s/p PPM (2021)  Chronic Diastolic Heart Failure (EF 60% 4/2023)  HTN and HLD  Mild intermittent hypoxia, likely atelectasis vs mild volume overload  Last echocardiogram 4/21/23;TAVR with normal valve function, left ventricular ejection fraction is visually estimated at 60%, mild dilation of the aorta is present involving the ascending aorta (maximal dimension 4.4 cm).   - Cardiology requested patient to remain on ASA 81mg .  Aspirin was restarted on POD#2   -  - Continue PTA Amiodarone 200mg daily ( - Continue PTA Lipitor 80mg daily   - PTA Lasix 40mg in am and 20mg in pm on hold in the immediate post-op setting, Lasix has been ordered, but refused by Pt   Plavix resumed today, remains on ASA  BMP stable  Amlodipine on hold secondary to soft BP  Plan monitor exam, BMP, BP, 02 sats  Encouraged Pt to allow resumption of at least reduced dose of lasix--he refuses      Alcohol Dependence  Previously drinking on average 5-6 drinks (beer, wine, hard alcohol). He weaned himself down and completely stopped drinking in preparation for procedure. Reports his last drink was ~2 weeks ago. Has never withdrawn.   - Does not need CIWA scoring    - Folate, B1, multivitamin supplements    Hypothyroidism   - Continue PTA Synthroid    Gout   - Continue PTA allopurinol    Depression   - Continue PTA Amitriptyline          Diet: Advance Diet as Tolerated: Regular Diet Adult  Snacks/Supplements Adult: Zachery; With Meals  Discharge Instruction - Regular Diet Adult    DVT Prophylaxis: Pneumatic Compression Devices  Bernal Catheter: Not present  Lines: None    "  Cardiac Monitoring: None  Code Status: Full Code      Clinically Significant Risk Factors                         # Obesity: Estimated body mass index is 38.34 kg/m  as calculated from the following:    Height as of this encounter: 1.651 m (5' 5\").    Weight as of this encounter: 104.5 kg (230 lb 6.1 oz).      # Financial/Environmental Concerns: none   # Pacemaker present       Disposition Plan     Expected Discharge Date: 12/30/2023      Destination: home with family;inpatient rehabilitation facility              Seng Montgomery MD  Hospitalist Service, GOLD TEAM 18  [facility]  Securely message with [employer] (more info)  Text page via [employer] Paging/Directory   See signed in provider for up to date coverage information  ______________________________________________________________________    Interval History   Symptoms as above    Physical Exam   Vital Signs: Temp: 100  F (37.8  C) Temp src: Oral BP: 93/74 Pulse: 101   Resp: 20 SpO2: (!) 84 % O2 Device: None (Room air)    Weight: 230 lbs 6.09 oz    General Appearance: Awake, alert, mod uncomfortable, lying in antoinette  Respiratory: clear, RR 14, unlabored  Cardiovascular: RRR  GI: Soft, non tender. protuberant  Skin: No bruising or bleeding   Other:Awake, alert and orientated X 3, however, after a long discussion with Pt re hospital course and pain management, he stated \"I'm going home today\"  Trace LE edema B      Medical Decision Making       35 MINUTES SPENT BY ME on the date of service doing chart review, history, exam, documentation & further activities per the note.  MANAGEMENT DISCUSSED with the following over the past 24 hours: Patient, bedside RN, primary team and care management        Data     I have personally reviewed the following data over the past 24 hrs:    N/A  \   N/A   / N/A     136 101 16.8 /  161 (H)   3.8 26 0.64 (L) \       "

## 2023-12-30 NOTE — PROGRESS NOTES
"At 0720, Issa Hamlin MD, asked for my assistance. This is his explanation of  what transpired:    \"On my arrival this morning I found Emile in the bathroom on his hands and knees next to the toilet with his walker underneath him. He says he missed the toilet and slipped but caught himself. Did not hit his head or injure himself as far as he can tell. We were able to get him lifted into bed with a crys. Has been confused and says he was rushing to get ready for OT when he slipped.\"    Patient has continued to have intermittent confusion during the shift. Left PIV SL. Chair alarm and bed alarm are both on and in working order. Assist of one with a gait belt and walker. Uses bedside urinal at times due to urgency. Last BM 12/29. Ate 75% of breakfast and lunch. Took pills whole. PRN Oxycodone given at 1300. Son visiting at bedside watching football.    Took patient to the bathroom to void at 1410 and was with patient until 1445. Unsteady sitting and standing. Heavy, slow transfer and no output. Wife is meeting with  and patient at bedside.     Plan is to discharge to TCU, per therapy.    Patient's most recent vital signs are:     Vital signs:  BP: 93/74  Temp: 99.5  HR: 101  RR: 20  SpO2: 91 %     Patient does not have new respiratory symptoms.  Patient does not have new sore throat.  Patient does not have a fever greater than 99.5.         "

## 2023-12-31 ENCOUNTER — APPOINTMENT (OUTPATIENT)
Dept: OCCUPATIONAL THERAPY | Facility: CLINIC | Age: 73
DRG: 456 | End: 2023-12-31
Attending: ORTHOPAEDIC SURGERY
Payer: MEDICARE

## 2023-12-31 LAB
BACTERIA TISS BX CULT: NO GROWTH
MAGNESIUM SERPL-MCNC: 2.3 MG/DL (ref 1.7–2.3)
PHOSPHATE SERPL-MCNC: 3.1 MG/DL (ref 2.5–4.5)
POTASSIUM SERPL-SCNC: 3.8 MMOL/L (ref 3.4–5.3)

## 2023-12-31 PROCEDURE — 36415 COLL VENOUS BLD VENIPUNCTURE: CPT | Performed by: ORTHOPAEDIC SURGERY

## 2023-12-31 PROCEDURE — 120N000002 HC R&B MED SURG/OB UMMC

## 2023-12-31 PROCEDURE — 250N000013 HC RX MED GY IP 250 OP 250 PS 637: Performed by: PHYSICIAN ASSISTANT

## 2023-12-31 PROCEDURE — 250N000013 HC RX MED GY IP 250 OP 250 PS 637: Performed by: INTERNAL MEDICINE

## 2023-12-31 PROCEDURE — 250N000013 HC RX MED GY IP 250 OP 250 PS 637

## 2023-12-31 PROCEDURE — 250N000011 HC RX IP 250 OP 636

## 2023-12-31 PROCEDURE — 84100 ASSAY OF PHOSPHORUS: CPT | Performed by: ORTHOPAEDIC SURGERY

## 2023-12-31 PROCEDURE — 97535 SELF CARE MNGMENT TRAINING: CPT | Mod: GO

## 2023-12-31 PROCEDURE — 250N000013 HC RX MED GY IP 250 OP 250 PS 637: Performed by: NURSE PRACTITIONER

## 2023-12-31 PROCEDURE — 84132 ASSAY OF SERUM POTASSIUM: CPT | Performed by: ORTHOPAEDIC SURGERY

## 2023-12-31 PROCEDURE — 99232 SBSQ HOSP IP/OBS MODERATE 35: CPT | Performed by: INTERNAL MEDICINE

## 2023-12-31 PROCEDURE — 250N000013 HC RX MED GY IP 250 OP 250 PS 637: Performed by: STUDENT IN AN ORGANIZED HEALTH CARE EDUCATION/TRAINING PROGRAM

## 2023-12-31 PROCEDURE — 83735 ASSAY OF MAGNESIUM: CPT | Performed by: ORTHOPAEDIC SURGERY

## 2023-12-31 RX ORDER — FUROSEMIDE 20 MG
20 TABLET ORAL DAILY
Status: DISCONTINUED | OUTPATIENT
Start: 2023-12-31 | End: 2024-01-03 | Stop reason: HOSPADM

## 2023-12-31 RX ORDER — POTASSIUM CHLORIDE 1500 MG/1
40 TABLET, EXTENDED RELEASE ORAL ONCE
Status: COMPLETED | OUTPATIENT
Start: 2023-12-31 | End: 2023-12-31

## 2023-12-31 RX ADMIN — SENNOSIDES AND DOCUSATE SODIUM 1 TABLET: 50; 8.6 TABLET ORAL at 21:25

## 2023-12-31 RX ADMIN — METHOCARBAMOL 500 MG: 500 TABLET ORAL at 21:25

## 2023-12-31 RX ADMIN — POLYETHYLENE GLYCOL 3350 17 G: 17 POWDER, FOR SOLUTION ORAL at 08:10

## 2023-12-31 RX ADMIN — ACETAMINOPHEN 975 MG: 325 TABLET, FILM COATED ORAL at 00:32

## 2023-12-31 RX ADMIN — HYDROMORPHONE HYDROCHLORIDE 0.4 MG: 0.2 INJECTION, SOLUTION INTRAMUSCULAR; INTRAVENOUS; SUBCUTANEOUS at 23:41

## 2023-12-31 RX ADMIN — METHOCARBAMOL 500 MG: 500 TABLET ORAL at 15:07

## 2023-12-31 RX ADMIN — AMITRIPTYLINE HYDROCHLORIDE 10 MG: 10 TABLET, FILM COATED ORAL at 21:25

## 2023-12-31 RX ADMIN — METHOCARBAMOL 500 MG: 500 TABLET ORAL at 08:10

## 2023-12-31 RX ADMIN — ACETAMINOPHEN 975 MG: 325 TABLET, FILM COATED ORAL at 08:10

## 2023-12-31 RX ADMIN — ACETAMINOPHEN 975 MG: 325 TABLET, FILM COATED ORAL at 23:42

## 2023-12-31 RX ADMIN — FUROSEMIDE 20 MG: 20 TABLET ORAL at 12:05

## 2023-12-31 RX ADMIN — ACETAMINOPHEN 975 MG: 325 TABLET, FILM COATED ORAL at 15:07

## 2023-12-31 RX ADMIN — OXYCODONE HYDROCHLORIDE 20 MG: 10 TABLET ORAL at 21:22

## 2023-12-31 RX ADMIN — FAMOTIDINE 20 MG: 20 TABLET ORAL at 21:25

## 2023-12-31 RX ADMIN — PREGABALIN 400 MG: 100 CAPSULE ORAL at 08:11

## 2023-12-31 RX ADMIN — OXYCODONE HYDROCHLORIDE 20 MG: 10 TABLET ORAL at 04:23

## 2023-12-31 RX ADMIN — Medication 1 TABLET: at 08:10

## 2023-12-31 RX ADMIN — FOLIC ACID 1 MG: 1 TABLET ORAL at 08:11

## 2023-12-31 RX ADMIN — LEVOTHYROXINE SODIUM 175 MCG: 175 TABLET ORAL at 08:11

## 2023-12-31 RX ADMIN — AMIODARONE HYDROCHLORIDE 200 MG: 200 TABLET ORAL at 08:11

## 2023-12-31 RX ADMIN — THIAMINE HCL TAB 100 MG 100 MG: 100 TAB at 08:11

## 2023-12-31 RX ADMIN — METHOCARBAMOL 500 MG: 500 TABLET ORAL at 12:05

## 2023-12-31 RX ADMIN — CLOPIDOGREL BISULFATE 75 MG: 75 TABLET ORAL at 08:11

## 2023-12-31 RX ADMIN — ASPIRIN 81 MG CHEWABLE TABLET 81 MG: 81 TABLET CHEWABLE at 08:10

## 2023-12-31 RX ADMIN — POTASSIUM CHLORIDE 40 MEQ: 1500 TABLET, EXTENDED RELEASE ORAL at 15:07

## 2023-12-31 RX ADMIN — SENNOSIDES AND DOCUSATE SODIUM 1 TABLET: 50; 8.6 TABLET ORAL at 08:10

## 2023-12-31 RX ADMIN — ALLOPURINOL 300 MG: 300 TABLET ORAL at 08:11

## 2023-12-31 RX ADMIN — PREGABALIN 200 MG: 100 CAPSULE ORAL at 21:25

## 2023-12-31 RX ADMIN — ATORVASTATIN CALCIUM 80 MG: 80 TABLET, FILM COATED ORAL at 21:25

## 2023-12-31 RX ADMIN — FAMOTIDINE 20 MG: 20 TABLET ORAL at 08:11

## 2023-12-31 ASSESSMENT — ACTIVITIES OF DAILY LIVING (ADL)
ADLS_ACUITY_SCORE: 38

## 2023-12-31 NOTE — PLAN OF CARE
"Goal Outcome Evaluation:                 Outcome Evaluation: Pt has been calm and cooperative. pt received PRN oxycodone 20mg once this shift. Pt was able to have pain relief from repositioning      Problem: Adult Inpatient Plan of Care  Goal: Plan of Care Review  Description: The Plan of Care Review/Shift note should be completed every shift.  The Outcome Evaluation is a brief statement about your assessment that the patient is improving, declining, or no change.  This information will be displayed automatically on your shift  note.  Outcome: Progressing  Flowsheets (Taken 12/30/2023 0389)  Outcome Evaluation: Pt has been calm and cooperative. pt received PRN oxycodone 20mg once this shift. Pt was able to have pain relief from repositioning  Goal: Patient-Specific Goal (Individualized)  Description: You can add care plan individualizations to a care plan. Examples of Individualization might be:  \"Parent requests to be called daily at 9am for status\", \"I have a hard time hearing out of my right ear\", or \"Do not touch me to wake me up as it startles  me\".  Outcome: Progressing  Goal: Absence of Hospital-Acquired Illness or Injury  Outcome: Progressing  Intervention: Identify and Manage Fall Risk  Recent Flowsheet Documentation  Taken 12/30/2023 1630 by Celio Calderon, RN  Safety Promotion/Fall Prevention:   activity supervised   increased rounding and observation  Goal: Optimal Comfort and Wellbeing  Outcome: Progressing  Goal: Readiness for Transition of Care  Outcome: Progressing     Problem: Spinal Surgery  Goal: Optimal Coping with Surgery  Outcome: Progressing  Goal: Absence of Bleeding  Outcome: Progressing  Goal: Effective Bowel Elimination  Outcome: Progressing  Goal: Fluid and Electrolyte Balance  Outcome: Progressing  Goal: Optimal Functional Ability  Outcome: Progressing  Intervention: Optimize Functional Status  Recent Flowsheet Documentation  Taken 12/30/2023 1630 by Celio Calderon, RN  Activity " Management: activity adjusted per tolerance  Goal: Absence of Infection Signs and Symptoms  Outcome: Progressing  Goal: Optimal Neurologic Function  Outcome: Progressing  Goal: Anesthesia/Sedation Recovery  Outcome: Progressing  Intervention: Optimize Anesthesia Recovery  Recent Flowsheet Documentation  Taken 12/30/2023 1630 by Celio Calderon RN  Safety Promotion/Fall Prevention:   activity supervised   increased rounding and observation  Reorientation Measures: reorientation provided  Goal: Optimal Pain Control and Function  Outcome: Progressing  Goal: Nausea and Vomiting Relief  Outcome: Progressing  Goal: Effective Urinary Elimination  Outcome: Progressing  Goal: Effective Oxygenation and Ventilation  Outcome: Progressing     Problem: Pain Acute  Goal: Optimal Pain Control and Function  Outcome: Progressing  Intervention: Prevent or Manage Pain  Recent Flowsheet Documentation  Taken 12/30/2023 1630 by Celio Calderon RN  Sensory Stimulation Regulation: quiet environment promoted  Medication Review/Management: medications reviewed     Problem: Fall Injury Risk  Goal: Absence of Fall and Fall-Related Injury  Outcome: Progressing  Intervention: Identify and Manage Contributors  Recent Flowsheet Documentation  Taken 12/30/2023 1630 by Celio Calderon, RN  Medication Review/Management: medications reviewed  Intervention: Promote Injury-Free Environment  Recent Flowsheet Documentation  Taken 12/30/2023 1630 by Celio Calderon, RN  Safety Promotion/Fall Prevention:   activity supervised   increased rounding and observation

## 2023-12-31 NOTE — PLAN OF CARE
"Goal Outcome Evaluation:       Patient is A & O x 4; coherent of speech, and able to make his needs known to staff; received scheduled medications; pain is controlled with scheduled pain medications; A x 1 with ambulating to the BR with gait belt and FWW; denied SOB, nausea and discomfort. Will follow POC. Call light is within reach.  /43 (BP Location: Right arm, Patient Position: Right side, Cuff Size: Adult Large)   Pulse 65   Temp 98.4  F (36.9  C) (Oral)   Resp 19   Ht 1.651 m (5' 5\")   Wt 104.5 kg (230 lb 6.1 oz)   SpO2 96%   BMI 38.34 kg/m      Noel Carrera RN                    "

## 2023-12-31 NOTE — PROGRESS NOTES
[facility]    Medicine Progress Note - Hospitalist Service, GOLD TEAM 18    Date of Admission:  12/26/2023    Course reviewed with team and with wife who was present    Events of today (12/31)  Intermittent confusion, likely secondary to opiods, doesn't recall working with therapies, doesn't recall discussing TCU necessity   Pt denies chest pain, SOB  States he will now accept reduced dose lasix  I did review in detail, the rationale for patient's likely need for TCU placement.  I did ask staff to have physical therapy come by and talk to patient, in the presence of his wife, to review his current status and recommendations  Discussed the fact that he has been intermittently confused, likely secondary to anesthesia, pain medications and that judicious use of pain medications is critical.    Assessment & Plan   Emile Wood is a 73 year old male patient with a past medical history significant for persistent atrial fibrillation (cardioversion x 3 attempts), CAD s/p CABG 2002 and s/p PCI to proximal to mid LCx with overlapping 3.5 x 16 mm and 3.0 x 28 mm Synergy BLANCA and OM2 with 3.0 x 12 mm Synergy to OM2 on 3/3/18 in setting of STEMI, diastolic heart failure, severe aortic stenosis s/p 29 mm Hartman Jovany 3 bioprosthetic TAVR 5/26/21, and SSS s/p PPM after TAVR, bleed risk (LLE hematoma after a fall) so 7/13/23 he underwent successful left atrial appendage closure with 35 mm Watchman FLX device. Additionally, history of HTN, HLD, PVD, carotid artery stenosis (<50% stenosis R & L internal carotid arteries 2019), mild 4.2cm fusiform dilation of the ascending thoracic aorta, hypothyroidism, former smoker, gout, alcoholic neuropathy with current alcohol dependence (4 beers per day), cocaine abuse in remission, depression, chronic opiate use, obesity, prediabetes, s/p LTKA, flatback syndrome, chronic low back pain, L2-S1 decompression (2016), TLIF L2-3 (2022) who was  admitted to [facility] after undergoing posterior spinal fusion T12-S1, bilateral pelvic fixation on 12/26/23 at [facility] with Dr. Zavaleta.    S/p Posterior Spinal Fusion T12-S1 (12/26/23), POD#2  Ortho primary. EBL: 1000cc.    - Management per primary orthopedics team including pain control, activity, antibiotics,   DVT proph mechanical    Intermittent confusion  C/w metabolic encephalopathy, likely secondary to medications, surgery, disrupted sleep  Plan monitor mental status, reduce oxycodone as possible.   Discussed with Pt and wife on 12/31    Persistent Atrial Fibrillation s/p Watchman (7/13/2023)  CAD s/p 2-vessel CABG 2002 (LIMA-LAD, radial-rPDA) and PCI x 2 BLANCA (2018)  Severe Aortic Stenosis s/p TAVR (2021)  SSS s/p PPM (2021)  Chronic Diastolic Heart Failure (EF 60% 4/2023)  HTN and HLD  Mild intermittent hypoxia, likely atelectasis vs mild volume overload-improved  Last echocardiogram 4/21/23;TAVR with normal valve function, left ventricular ejection fraction is visually estimated at 60%, mild dilation of the aorta is present involving the ascending aorta (maximal dimension 4.4 cm).   - Cardiology requested patient to remain on ASA 81mg .  Aspirin was restarted on POD#2   -  - Continue PTA Amiodarone 200mg daily ( - Continue PTA Lipitor 80mg daily   - PTA Lasix 40mg in am and 20mg in pm on hold in the immediate post-op setting, Lasix has been ordered, but refused by Pt--now agrees to start lasix 20 mg daily  On resumed Plavix and AS  BMP stable  Amlodipine remains on hold secondary to soft BP  Plan monitor exam, BMP, BP, 02 sats  Encouraged Pt to allow resumption of at least reduced dose of lasix--he refuses      Alcohol Dependence  Previously drinking on average 5-6 drinks (beer, wine, hard alcohol). He weaned himself down and completely stopped drinking in preparation for procedure. Reports his last drink was ~2 weeks ago. Has never withdrawn.   - Does not need CIWA scoring    - Folate, B1, multivitamin  supplements    Hypothyroidism   - Continue PTA Synthroid    Gout   - Continue PTA allopurinol    Depression   - Continue PTA Amitriptyline          Diet: Advance Diet as Tolerated: Regular Diet Adult  Snacks/Supplements Adult: Zachery; With Meals  Discharge Instruction - Regular Diet Adult    DVT Prophylaxis: Pneumatic Compression Devices  Bernal Catheter: Not present  Lines: None     Cardiac Monitoring: None  Code Status: Full Code          Seng Montgomery MD  Hospitalist Service, GOLD TEAM 18  [facility]  Securely message with [employer] (more info)  Text page via [employer] Paging/Directory   See signed in provider for up to date coverage information  ______________________________________________________________________    Interval History   Symptoms as above    Physical Exam   Vital Signs: Temp: 98.4  F (36.9  C) Temp src: Oral BP: 125/43 Pulse: 65   Resp: 19 SpO2: 96 % O2 Device: None (Room air)    Weight: 230 lbs 6.09 oz    General Appearance: Awake, alert, fully oriented, though confused re some post op events  Respiratory: clear, RR 14, unlabored  Cardiovascular: RRR  GI: Soft, non tender. protuberant  Skin: No bruising or bleeding   NEURO exam per spine team  Trace to 1 +  LE edema B        35 MINUTES SPENT BY ME on the date of service doing chart review, history, exam, documentation & further activities per the note.  MANAGEMENT DISCUSSED with the following over the past 24 hours: Patient, bedside RN, primary team and care management        Data     I have personally reviewed the following data over the past 24 hrs:    N/A  \   N/A   / N/A     N/A N/A N/A /  N/A   3.8 N/A N/A \

## 2023-12-31 NOTE — PLAN OF CARE
Pt. Is A & O X 4. Responds adequately to questions being asked. Denies SOB or acute distress. Verbalized and rates pain @ 9/10 initially declined medication for pain management but accepted later after offering for the 3rd time. PRN Oxycodone 20 mg administered and tolerated. On RN managed Mag, Phos, K+ protocol. Labs scheduled to be drawn in the AM. Ambulated with A1 with walker and gait belt to the restroom. Pt. is continent of bladder elimination. A1 with brenda crae. Call button placed within reach. Plan of care is on-going.

## 2023-12-31 NOTE — PROGRESS NOTES
"Orthopaedic Surgery Progress Note:  12/31/2023     Subjective:   Mental status seems improved. Able to discuss yesterdays events and is oriented to situation. Has chadwick out and has been more ambulatory with nursing.    Objective:   /43 (BP Location: Right arm, Patient Position: Right side, Cuff Size: Adult Large)   Pulse 65   Temp 98.4  F (36.9  C) (Oral)   Resp 19   Ht 1.651 m (5' 5\")   Wt 104.5 kg (230 lb 6.1 oz)   SpO2 96%   BMI 38.34 kg/m    No intake/output data recorded.  Gen: NAD.  Resp: Breathing comfortably on RA  Drain:   Musculoskeletal: dressing c/d/I.    Lower extremities:  Motor Strength Right Left   Hip flexion: L1, L2, L3 5/5 5/5   Hip adduction: L2, L3 5/5 5/5   Knee flexion: S1 5/5 5/5   Knee extension: L3, L4 5/5 5/5   Ankle dosiflexion: L4, L5 5/5 5/5   EHL: L5 5/5 5/5   Ankle plantarflexion: S1 5/5 5/5     Sensation from L1-S2 is preserved although decreased at baseline 2/2 to neuropathy.     Labs:  Lab Results   Component Value Date    WBC 7.4 12/26/2023    HGB 10.8 (L) 12/27/2023     12/26/2023    INR 1.05 12/26/2023        Assessment & Plan:   Emile Wood is a 73 year old male with now s/p PSIF T12-S1 on 12/26 with Dr. Zavaleta.     Goals for today:  - PT  - TCU placement  -Pain control  - Delirium precautions    Ortho Primary  Activity: Up with assist until independent. No excessive bending or twisting. No lifting >10 lbs x 6 weeks. No Sharri lift for transfers.   Weight bearing status: WBAT.  Pain management: Transition from IV to PO as tolerated.    Antibiotics: Ancef until drains are removed.  Diet: Begin with clear fluids and progress diet as tolerated. Zachery nutritional supplement twice daily   DVT prophylaxis: SCDs only. Preop ASA and plavix.  Imaging: XR Upright L spine PTDC - ordered.  Labs: hgb pod1 pending  Bracing/Splinting: None.  Dressings: Keep clean, dry and intact. Patient may shower with bandage in place. Dressing to be removed 2 weeks after " surgery. Please change or reinforce as necessary for strikethrough or saturation.   Drains: Document output per shift, will be discontinued at Orthopedic Surgery discretion.  Bernal catheter: Removed POD#1.   Physical Therapy/Occupational Therapy: Eval and treat.  Consults: Hospitalist.  Follow-up: Clinic with Dr. Zavaleta in 6 weeks with repeat x-rays.   Disposition: Pending progress with therapies, pain control on orals, post-op imaging, and drain removal.     Orthopaedics surgery staff for this patient is Dr. Zavaleta.     ------------------------------------------------------------------------------------------     [  x ] Drains removed.  [   ] Post xrays done.  [   ] Discharge orders done.  [   ] Discharge summary started.     Jad Hamlin MD  Orthopaedic Surgery PGY-4    FOLLOWUP:    Future Appointments   Date Time Provider Department Center   12/27/2023  2:30 PM Geni Faith, PT [facility] [place]   12/27/2023  4:30 PM UR OT WAITLIST [facility] [place]   2/7/2024 11:20 AM Nacoh Zavaleta MD [facility]

## 2023-12-31 NOTE — PROGRESS NOTES
"Care Management Follow Up    Length of Stay (days): 5    Expected Discharge Date: 1/2/24     Concerns to be Addressed: discharge planning     Patient plan of care discussed at interdisciplinary rounds: Yes    Anticipated Discharge Disposition: Acute Rehab     Anticipated Discharge Services: None  Anticipated Discharge DME: Danny    Patient/family educated on Medicare website which has current facility and service quality ratings: yes  Education Provided on the Discharge Plan: Yes  Patient/Family in Agreement with the Plan: yes    Referrals Placed by CM/SW: yes  Private pay costs discussed: private room/amenity fees  Pt and family would like private room, they accept that this will be an additional fee of around $50/day.     Additional Information:     met with pt, spouse Blessing and son Hood on 12/30/23 to discuss medicare.gov ratings and referrals. Reviewed \"Medicare A\" SNF benefits, including when Medicare will stop paying for TCU Stay.   Note: Pt may not recall writer's visit as he was falling asleep during conversation.     SW initiated referrals to following facilities via [facility] on 12/30/23:     [facility]  [address] [place] [address]  [phone] f:[phone]  12/31: SW called and left a VM for admissions regarding referral for TCU.      [facility]  [address] [place] [address]  [phone] f:[phone]  12/31: SW called and left a VM for admissions regarding referral for TCU. Admissions closed until 2/2/24.    [facility]  [address] [place] [address]  [phone] f:[phone]  12/31: SW called and left a VM for admissions regarding referral for TCU.      [facility]  [address] [place] [address] [phone] f: [phone]  12/31: SW called to follow up on TCU referral, but was unable to leave a VM with admissions.      [facility]  [id number] [place] [address] [address] [place] [address]  [phone] " f:[phone]  12/31: SW called and left a VM for admissions (Venessa) regarding referral for TCU.      [address] [place] [address]  [phone] f:[phone]  12/31: SW called and left a VM for admissions regarding referral for TCU.      [facility]   12/31: Per Shannan, pt still looks appropriate for ARU. Contact when pt is med ready.      SW will continue to follow for discharge planning.    XOCHILT Connolly, LGSW  Coverage   [facility]  [email or web address]

## 2024-01-01 LAB
ANION GAP SERPL CALCULATED.3IONS-SCNC: 4 MMOL/L (ref 7–15)
BUN SERPL-MCNC: 13 MG/DL (ref 8–23)
CALCIUM SERPL-MCNC: 8.3 MG/DL (ref 8.8–10.2)
CHLORIDE SERPL-SCNC: 102 MMOL/L (ref 98–107)
CREAT SERPL-MCNC: 0.59 MG/DL (ref 0.67–1.17)
DEPRECATED HCO3 PLAS-SCNC: 30 MMOL/L (ref 22–29)
EGFRCR SERPLBLD CKD-EPI 2021: >90 ML/MIN/1.73M2
GLUCOSE SERPL-MCNC: 116 MG/DL (ref 70–99)
MAGNESIUM SERPL-MCNC: 2.2 MG/DL (ref 1.7–2.3)
PHOSPHATE SERPL-MCNC: 3.2 MG/DL (ref 2.5–4.5)
POTASSIUM SERPL-SCNC: 3.9 MMOL/L (ref 3.4–5.3)
SODIUM SERPL-SCNC: 136 MMOL/L (ref 135–145)

## 2024-01-01 PROCEDURE — 250N000013 HC RX MED GY IP 250 OP 250 PS 637: Performed by: PHYSICIAN ASSISTANT

## 2024-01-01 PROCEDURE — 36415 COLL VENOUS BLD VENIPUNCTURE: CPT | Performed by: INTERNAL MEDICINE

## 2024-01-01 PROCEDURE — 80048 BASIC METABOLIC PNL TOTAL CA: CPT | Performed by: INTERNAL MEDICINE

## 2024-01-01 PROCEDURE — 120N000002 HC R&B MED SURG/OB UMMC

## 2024-01-01 PROCEDURE — 250N000013 HC RX MED GY IP 250 OP 250 PS 637: Performed by: NURSE PRACTITIONER

## 2024-01-01 PROCEDURE — 250N000013 HC RX MED GY IP 250 OP 250 PS 637: Performed by: STUDENT IN AN ORGANIZED HEALTH CARE EDUCATION/TRAINING PROGRAM

## 2024-01-01 PROCEDURE — 250N000013 HC RX MED GY IP 250 OP 250 PS 637

## 2024-01-01 PROCEDURE — 83735 ASSAY OF MAGNESIUM: CPT | Performed by: ORTHOPAEDIC SURGERY

## 2024-01-01 PROCEDURE — 99232 SBSQ HOSP IP/OBS MODERATE 35: CPT | Performed by: INTERNAL MEDICINE

## 2024-01-01 PROCEDURE — 84100 ASSAY OF PHOSPHORUS: CPT | Performed by: ORTHOPAEDIC SURGERY

## 2024-01-01 PROCEDURE — 250N000011 HC RX IP 250 OP 636

## 2024-01-01 PROCEDURE — 250N000013 HC RX MED GY IP 250 OP 250 PS 637: Performed by: INTERNAL MEDICINE

## 2024-01-01 RX ADMIN — METHOCARBAMOL 500 MG: 500 TABLET ORAL at 16:49

## 2024-01-01 RX ADMIN — FOLIC ACID 1 MG: 1 TABLET ORAL at 09:43

## 2024-01-01 RX ADMIN — ACETAMINOPHEN 975 MG: 325 TABLET, FILM COATED ORAL at 16:49

## 2024-01-01 RX ADMIN — CLOPIDOGREL BISULFATE 75 MG: 75 TABLET ORAL at 09:43

## 2024-01-01 RX ADMIN — ALLOPURINOL 300 MG: 300 TABLET ORAL at 09:43

## 2024-01-01 RX ADMIN — SENNOSIDES AND DOCUSATE SODIUM 1 TABLET: 50; 8.6 TABLET ORAL at 09:43

## 2024-01-01 RX ADMIN — HYDROMORPHONE HYDROCHLORIDE 0.4 MG: 0.2 INJECTION, SOLUTION INTRAMUSCULAR; INTRAVENOUS; SUBCUTANEOUS at 10:24

## 2024-01-01 RX ADMIN — METHOCARBAMOL 500 MG: 500 TABLET ORAL at 09:44

## 2024-01-01 RX ADMIN — OXYCODONE HYDROCHLORIDE 20 MG: 10 TABLET ORAL at 03:29

## 2024-01-01 RX ADMIN — PREGABALIN 200 MG: 100 CAPSULE ORAL at 20:51

## 2024-01-01 RX ADMIN — METHOCARBAMOL 500 MG: 500 TABLET ORAL at 20:51

## 2024-01-01 RX ADMIN — PREGABALIN 400 MG: 100 CAPSULE ORAL at 09:43

## 2024-01-01 RX ADMIN — Medication 1 TABLET: at 09:43

## 2024-01-01 RX ADMIN — AMIODARONE HYDROCHLORIDE 200 MG: 200 TABLET ORAL at 09:43

## 2024-01-01 RX ADMIN — ATORVASTATIN CALCIUM 80 MG: 80 TABLET, FILM COATED ORAL at 20:52

## 2024-01-01 RX ADMIN — POLYETHYLENE GLYCOL 3350 17 G: 17 POWDER, FOR SOLUTION ORAL at 09:44

## 2024-01-01 RX ADMIN — ASPIRIN 81 MG CHEWABLE TABLET 81 MG: 81 TABLET CHEWABLE at 09:43

## 2024-01-01 RX ADMIN — OXYCODONE HYDROCHLORIDE 20 MG: 10 TABLET ORAL at 12:38

## 2024-01-01 RX ADMIN — FAMOTIDINE 20 MG: 20 TABLET ORAL at 20:51

## 2024-01-01 RX ADMIN — METHOCARBAMOL 500 MG: 500 TABLET ORAL at 13:38

## 2024-01-01 RX ADMIN — SENNOSIDES AND DOCUSATE SODIUM 1 TABLET: 50; 8.6 TABLET ORAL at 20:51

## 2024-01-01 RX ADMIN — AMITRIPTYLINE HYDROCHLORIDE 10 MG: 10 TABLET, FILM COATED ORAL at 20:52

## 2024-01-01 RX ADMIN — OXYCODONE HYDROCHLORIDE 20 MG: 10 TABLET ORAL at 23:46

## 2024-01-01 RX ADMIN — FAMOTIDINE 20 MG: 20 TABLET ORAL at 09:44

## 2024-01-01 RX ADMIN — FUROSEMIDE 20 MG: 20 TABLET ORAL at 09:43

## 2024-01-01 RX ADMIN — ACETAMINOPHEN 975 MG: 325 TABLET, FILM COATED ORAL at 23:46

## 2024-01-01 RX ADMIN — LEVOTHYROXINE SODIUM 175 MCG: 175 TABLET ORAL at 09:44

## 2024-01-01 RX ADMIN — ACETAMINOPHEN 975 MG: 325 TABLET, FILM COATED ORAL at 09:44

## 2024-01-01 ASSESSMENT — ACTIVITIES OF DAILY LIVING (ADL)
ADLS_ACUITY_SCORE: 42
ADLS_ACUITY_SCORE: 42
ADLS_ACUITY_SCORE: 38
ADLS_ACUITY_SCORE: 42
ADLS_ACUITY_SCORE: 38
ADLS_ACUITY_SCORE: 38
ADLS_ACUITY_SCORE: 42
ADLS_ACUITY_SCORE: 38
ADLS_ACUITY_SCORE: 42

## 2024-01-01 NOTE — PROGRESS NOTES
"Orthopaedic Surgery Progress Note:  01/01/2024     Subjective:   NAEON. AVSS. Pain 7/10. Mentating well and oriented to place, time, situation. Voiding via condom cath. Planning for TCU placement and referrals sent.    Objective:   BP (!) 99/35 (BP Location: Right arm)   Pulse 61   Temp 98.5  F (36.9  C) (Oral)   Resp 19   Ht 1.651 m (5' 5\")   Wt 104.5 kg (230 lb 6.1 oz)   SpO2 96%   BMI 38.34 kg/m    No intake/output data recorded.  Gen: NAD.  Resp: Breathing comfortably on RA  Drain: previously removed  Musculoskeletal: dressing c/d/I.    Lower extremities:  Motor Strength Right Left   Hip flexion: L1, L2, L3 5/5 5/5   Hip adduction: L2, L3 5/5 5/5   Knee flexion: S1 5/5 5/5   Knee extension: L3, L4 5/5 5/5   Ankle dosiflexion: L4, L5 5/5 5/5   EHL: L5 5/5 5/5   Ankle plantarflexion: S1 5/5 5/5     Sensation from L1-S2 is preserved although decreased at baseline 2/2 to neuropathy.     Labs:  Lab Results   Component Value Date    WBC 7.4 12/26/2023    HGB 10.8 (L) 12/27/2023     12/26/2023    INR 1.05 12/26/2023        Assessment & Plan:   Emile Wood is a 73 year old male with now s/p PSIF T12-S1 on 12/26 with Dr. Zavaleta.     Goals for today:  - PT  - Postop XR today  - safe dispo planning - plan for TCU, appreciate SW assistance    Ortho Primary  Activity: Up with assist until independent. No excessive bending or twisting. No lifting >10 lbs x 6 weeks. No Sharri lift for transfers.   Weight bearing status: WBAT.  Pain management: Transition from IV to PO as tolerated.    Antibiotics: Ancef until drains are removed.  Diet: Begin with clear fluids and progress diet as tolerated. Zachery nutritional supplement twice daily   DVT prophylaxis: SCDs only. Preop ASA and plavix.  Imaging: XR Upright L spine PTDC - ordered.  Labs: hgb pod1 pending  Bracing/Splinting: None.  Dressings: Keep clean, dry and intact. Patient may shower with bandage in place. Dressing to be removed 2 weeks after surgery. " Please change or reinforce as necessary for strikethrough or saturation.   Drains: previously removed  Bernal catheter: Removed POD#1.   Physical Therapy/Occupational Therapy: Eval and treat.  Consults: Hospitalist.  Follow-up: Clinic with Dr. Zavaleta in 6 weeks with repeat x-rays.   Disposition: Pending progress with therapies, pain control on orals, post-op imaging, and drain removal.     Orthopaedics surgery staff for this patient is Dr. Zavaleta.     ------------------------------------------------------------------------------------------     [  x ] Drains removed.  [   ] Post xrays done.  [   ] Discharge orders done.  [   ] Discharge summary started.    Kell Clarke MD  Orthopaedic Surgery PGY-4    FOLLOWUP:    Future Appointments   Date Time Provider Department [place]   12/27/2023  2:30 PM Geni Faith, PT [facility] [place]   12/27/2023  4:30 PM UR OT WAITLIST [facility] [place]   2/7/2024 11:20 AM Nacho Zavaleta MD [facility]

## 2024-01-01 NOTE — PROGRESS NOTES
Brief Orthopedic Update Note:    I was paged by nursing staff to discuss pain management with patient and family.  On presentation to patient's room, patient was resting on his right side complaining of left-sided posterior hip pain.  The pain was dull, constant, and located primarily over gluteus musculature.  Patient states that the pain is most severe when trying to get back into bed after working with therapies or eating in the chair.  Patient denies pain in area with range of motion of hip or palpation. Patient denies radiation of pain down his leg.  Patient has no sensory or motor neurovascular deficits.  Patient states that he is concerned at the level of pain he is in being 6 days postop.  Patient's brother reportedly had back surgery and told patient that morphine helped control his pain and now patient is requesting addition of morphine to his pain regiment.    I discussed the nature of the pain likely being related to the pelvic fixation aspect of the surgery, and how he was currently on numerous medications to control this pain.  I discussed how he has multiple ongoing opiate medications to include PRNs of Dilaudid and oxycodone, and how adding additional narcotic such as morphine would only be dangerous but not likely benefit his long-term pain outcomes.  Patient expressed understanding of this.  At this point I will reach back out to the pain medicine team who is previously seen this patient, to see if they have any modifications that can be made or any other therapies that be recommended.  Patient and family were amenable to this plan.      Quinton Martinez MD  PGY-1  Orthopaedic Surgery  Pager: [phone]     Please page me directly with any questions/concerns during regular weekday hours before 5 pm. If there is no response, if it is a weekend, or if it is during evening hours then please page the orthopedic surgery resident on call.

## 2024-01-01 NOTE — PROGRESS NOTES
Care Management Follow Up    Length of Stay (days): 6    Expected Discharge Date: 1/2/2024     Concerns to be Addressed: discharge planning     Patient plan of care discussed at interdisciplinary rounds: Yes    Anticipated Discharge Disposition: Acute Rehab, Home     Anticipated Discharge Services: None  Anticipated Discharge DME: [facility]    Patient/family educated on Medicare website which has current facility and service quality ratings: yes  Education Provided on the Discharge Plan: Yes  Patient/Family in Agreement with the Plan: yes    Referrals Placed by CM/SW: External Care Coordination  Private pay costs discussed: private room/amenity fees [facility] charges $48/day    Additional Information:  SW informed that pt may be ready for discharge 1/2/2024. He was not able to stand for Upright xray today, will need to try for xray 1/2/2024.    SW spoke w/pt's wife Blessing re: discharge plans. Provided education on ARU vs TCU as pt is still being followed by  Rehab and could potentially be ARU candidate (per SW note from 12/31/2023).    SW provided information/education about [facility] being able to accept pt pending they have bed available.      Discussed referral to [facility]; Dr Montgomery had provided information about [facility] and pt has friend who had been there for TCU and had positive experience. Discussed that [facility] is closed for holiday weekend and that Unit SW may have more information about referral and bed availability on Tuesday, 1/2/2024.    Reviewed Important Message from Medicare as education on pt/family rights when it comes to discharge planning, including ability to appeal discharge on day of discharge should Blessing or pt no believe he is healthy enough to leave hospital. (Signature not obtained).    Blessing expressed frustration at pt not having been seen by PT this weekend, but believes this is most likely due to holiday weekend/staffing. Writer provided support, validation and  reassurance. Blessing also expressed concerns about pt's pain control. Writer offered support and suggested Blessing to speak w/nursing staff re: concerns over pt's pain control.    SW con't to follow for discharge planning.    ALEXIS Carrillo, MSW        Social Work and Care Management Department       SEARCHABLE in [employer] - search SOCIAL WORK     [facility] ([id number] Saturday and Sunday)    Units: 4A, 4C, 4E   Pager #6: [phone] Units: 6A & 6B  Pager #2: [phone] Units: 7A &7B   Pager #4: [phone]   Units: 5A, 5B, & 5C  Pager #1: [phone] Units: 6C & 6D  Pager #3: [phone] Units: 7C & 7D  Pager #5: [phone]   Unit: [facility]  Pager: [phone] (page copies to ED SW staff)     [facility] ([phone]) Saturday and Sunday     Units: 5 Ortho, 5 Med/Surg & WB ED  Pager #7: [phone] Units: 6 Med/Surg, 8A, 10A ICU  Pager #8: [phone]     After hours (1630 - 0000) Saturday & Sunday; ([phone]) Mon-Fri; ([phone]) [facility] Recognized Holidays    Units: ALL  Pager: [phone]

## 2024-01-01 NOTE — PROGRESS NOTES
Pt A/O X4, RA, denies SOB/CP. Regular diet, meds whole w/ thin liquids. Continent of B&B, primofit in place. Assist x1 w/ walker. R & L PIV saline locked. Oxy last given at 0329, IV dilaudid at 2341. No significant changes on shift, will continue POC.

## 2024-01-01 NOTE — PROGRESS NOTES
[facility]    Medicine Progress Note - Hospitalist Service, GOLD TEAM 18    Date of Admission:  12/26/2023    Course reviewed with team    Patient notes fair pain control.  He states he understands need for TCU placement.  He denies cough, chest pain, shortness of breath.  He did receive IV Dilaudid last night    Medical issues to address at time of discharge include dose of furosemide and laboratory follow-up.  He has agreed to take furosemide 20 mg daily.  Anticipate discharging on a higher dose i.e. 40 mg daily.  Prior to mission dose of 60 mg daily    Assessment & Plan   Emile Wood is a 73 year old male patient with a past medical history significant for persistent atrial fibrillation (cardioversion x 3 attempts), CAD s/p CABG 2002 and s/p PCI to proximal to mid LCx with overlapping 3.5 x 16 mm and 3.0 x 28 mm Synergy BLANCA and OM2 with 3.0 x 12 mm Synergy to OM2 on 3/3/18 in setting of STEMI, diastolic heart failure, severe aortic stenosis s/p 29 mm Hartman Jovany 3 bioprosthetic TAVR 5/26/21, and SSS s/p PPM after TAVR, bleed risk (LLE hematoma after a fall) so 7/13/23 he underwent successful left atrial appendage closure with 35 mm Watchman FLX device. Additionally, history of HTN, HLD, PVD, carotid artery stenosis (<50% stenosis R & L internal carotid arteries 2019), mild 4.2cm fusiform dilation of the ascending thoracic aorta, hypothyroidism, former smoker, gout, alcoholic neuropathy with current alcohol dependence (4 beers per day), cocaine abuse in remission, depression, chronic opiate use, obesity, prediabetes, s/p LTKA, flatback syndrome, chronic low back pain, L2-S1 decompression (2016), TLIF L2-3 (2022) who was admitted to [facility] after undergoing posterior spinal fusion T12-S1, bilateral pelvic fixation on 12/26/23 at [facility] with Dr. Zavaleta.    S/p Posterior Spinal Fusion T12-S1 (12/26/23), POD#2  Ortho primary. EBL: 1000cc.    - Management per  primary orthopedics team including pain control, activity, antibiotics,   DVT proph mechanical    Intermittent confusion  C/w metabolic encephalopathy, likely secondary to medications, surgery, disrupted sleep.  Mental status improved this morning  Plan monitor mental status, judicious use of pain medications  Discussed with Pt and wife on 12/31    Persistent Atrial Fibrillation s/p Watchman (7/13/2023)  CAD s/p 2-vessel CABG 2002 (LIMA-LAD, radial-rPDA) and PCI x 2 BLANCA (2018)  Severe Aortic Stenosis s/p TAVR (2021)  SSS s/p PPM (2021)  Chronic Diastolic Heart Failure (EF 60% 4/2023)  HTN and HLD  Mild intermittent hypoxia, likely atelectasis vs mild volume overload-improved  O2 sats now upper 90s room air  Last echocardiogram 4/21/23;TAVR with normal valve function, left ventricular ejection fraction is visually estimated at 60%, mild dilation of the aorta is present involving the ascending aorta (maximal dimension 4.4 cm).   - Cardiology requested patient to remain on ASA 81mg .  Aspirin was restarted on POD#2   -  - Continue PTA Amiodarone 200mg daily ( - Continue PTA Lipitor 80mg daily   - PTA Lasix 40mg in am and 20mg in pm on hold in the immediate post-op setting, Lasix had been ordered, but refused by Pt has agreed to start lasix 20 mg daily  On resumed Plavix and AS  BMP stable  Amlodipine remains on hold secondary to soft BP  Plan monitor exam, BMP, BP, 02 sats  Consider increase lasix to 40 mg daily on discharge      Alcohol Dependence  Previously drinking on average 5-6 drinks (beer, wine, hard alcohol). He weaned himself down and completely stopped drinking in preparation for procedure. Reports his last drink was ~2 weeks ago. Has never withdrawn.   - Does not need CIWA scoring    - Folate, B1, multivitamin supplements    Hypothyroidism   - Continue PTA Synthroid    Gout   - Continue PTA allopurinol    Depression   - Continue PTA Amitriptyline          Diet: Advance Diet as Tolerated: Regular Diet  Adult  Snacks/Supplements Adult: Zachery; With Meals  Discharge Instruction - Regular Diet Adult    DVT Prophylaxis: Pneumatic Compression Devices  Bernal Catheter: Not present  Lines: None     Cardiac Monitoring: None  Code Status: Full Code          Seng Montgomery MD  Hospitalist Service, GOLD TEAM 18  [facility]  Securely message with [employer] (more info)  Text page via [employer] Paging/Directory   See signed in provider for up to date coverage information  ______________________________________________________________________    Interval History   Symptoms as above    Physical Exam   Vital Signs: Temp: 98.5  F (36.9  C) Temp src: Oral BP: 123/57 Pulse: 63   Resp: 18   O2 Device: None (Room air)    Weight: 230 lbs 6.09 oz    General Appearance: Awake, alert, fully oriented, pleasatn  Respiratory: clear, RR 12  Cardiovascular: RRR  GI: Soft, non tender. protuberant  Skin: No bruising or bleeding   NEURO exam per spine team  Trace   LE edema B          Data     I have personally reviewed the following data over the past 24 hrs:    N/A  \   N/A   / N/A     136 102 13.0 /  116 (H)   3.9 30 (H) 0.59 (L) \

## 2024-01-02 ENCOUNTER — APPOINTMENT (OUTPATIENT)
Dept: OCCUPATIONAL THERAPY | Facility: CLINIC | Age: 74
DRG: 456 | End: 2024-01-02
Attending: ORTHOPAEDIC SURGERY
Payer: MEDICARE

## 2024-01-02 ENCOUNTER — APPOINTMENT (OUTPATIENT)
Dept: GENERAL RADIOLOGY | Facility: CLINIC | Age: 74
DRG: 456 | End: 2024-01-02
Attending: STUDENT IN AN ORGANIZED HEALTH CARE EDUCATION/TRAINING PROGRAM
Payer: MEDICARE

## 2024-01-02 ENCOUNTER — APPOINTMENT (OUTPATIENT)
Dept: PHYSICAL THERAPY | Facility: CLINIC | Age: 74
DRG: 456 | End: 2024-01-02
Attending: ORTHOPAEDIC SURGERY
Payer: MEDICARE

## 2024-01-02 LAB
BACTERIA TISS BX CULT: NORMAL

## 2024-01-02 PROCEDURE — 999N000065 XR THORACIC LUMBAR STANDING 2 VIEWS

## 2024-01-02 PROCEDURE — 250N000013 HC RX MED GY IP 250 OP 250 PS 637: Performed by: STUDENT IN AN ORGANIZED HEALTH CARE EDUCATION/TRAINING PROGRAM

## 2024-01-02 PROCEDURE — 97535 SELF CARE MNGMENT TRAINING: CPT | Mod: GO

## 2024-01-02 PROCEDURE — 97530 THERAPEUTIC ACTIVITIES: CPT | Mod: GP

## 2024-01-02 PROCEDURE — 250N000013 HC RX MED GY IP 250 OP 250 PS 637

## 2024-01-02 PROCEDURE — 99231 SBSQ HOSP IP/OBS SF/LOW 25: CPT | Mod: 95 | Performed by: PHYSICIAN ASSISTANT

## 2024-01-02 PROCEDURE — 250N000013 HC RX MED GY IP 250 OP 250 PS 637: Performed by: NURSE PRACTITIONER

## 2024-01-02 PROCEDURE — 97116 GAIT TRAINING THERAPY: CPT | Mod: GP

## 2024-01-02 PROCEDURE — 120N000002 HC R&B MED SURG/OB UMMC

## 2024-01-02 PROCEDURE — 250N000013 HC RX MED GY IP 250 OP 250 PS 637: Performed by: INTERNAL MEDICINE

## 2024-01-02 PROCEDURE — 250N000013 HC RX MED GY IP 250 OP 250 PS 637: Performed by: PHYSICIAN ASSISTANT

## 2024-01-02 PROCEDURE — 99232 SBSQ HOSP IP/OBS MODERATE 35: CPT | Performed by: INTERNAL MEDICINE

## 2024-01-02 PROCEDURE — 999N000065 XR PELVIS 1/2 VIEWS

## 2024-01-02 RX ORDER — FOLIC ACID 1 MG/1
1 TABLET ORAL DAILY
Start: 2024-01-03

## 2024-01-02 RX ORDER — LIDOCAINE 4 G/G
1 PATCH TOPICAL EVERY 24 HOURS
Qty: 30 PATCH | Refills: 0 | Status: SHIPPED | OUTPATIENT
Start: 2024-01-02

## 2024-01-02 RX ORDER — LIDOCAINE 4 G/G
1 PATCH TOPICAL
Status: DISCONTINUED | OUTPATIENT
Start: 2024-01-03 | End: 2024-01-03 | Stop reason: HOSPADM

## 2024-01-02 RX ORDER — FUROSEMIDE 20 MG
20 TABLET ORAL DAILY
Start: 2024-01-03

## 2024-01-02 RX ORDER — OXYCODONE HYDROCHLORIDE 10 MG/1
10 TABLET ORAL EVERY 4 HOURS PRN
Status: DISCONTINUED | OUTPATIENT
Start: 2024-01-02 | End: 2024-01-03 | Stop reason: HOSPADM

## 2024-01-02 RX ORDER — MULTIPLE VITAMINS W/ MINERALS TAB 9MG-400MCG
1 TAB ORAL DAILY
Start: 2024-01-03

## 2024-01-02 RX ORDER — METHOCARBAMOL 500 MG/1
500 TABLET, FILM COATED ORAL 4 TIMES DAILY
Qty: 35 TABLET | Refills: 0 | Status: SHIPPED | OUTPATIENT
Start: 2024-01-02 | End: 2024-01-29

## 2024-01-02 RX ADMIN — OXYCODONE HYDROCHLORIDE 15 MG: 5 TABLET ORAL at 23:03

## 2024-01-02 RX ADMIN — Medication 1 TABLET: at 09:20

## 2024-01-02 RX ADMIN — CLOPIDOGREL BISULFATE 75 MG: 75 TABLET ORAL at 09:20

## 2024-01-02 RX ADMIN — METHOCARBAMOL 500 MG: 500 TABLET ORAL at 20:00

## 2024-01-02 RX ADMIN — ACETAMINOPHEN 975 MG: 325 TABLET, FILM COATED ORAL at 09:19

## 2024-01-02 RX ADMIN — SENNOSIDES AND DOCUSATE SODIUM 1 TABLET: 50; 8.6 TABLET ORAL at 09:20

## 2024-01-02 RX ADMIN — AMITRIPTYLINE HYDROCHLORIDE 10 MG: 10 TABLET, FILM COATED ORAL at 23:03

## 2024-01-02 RX ADMIN — SENNOSIDES AND DOCUSATE SODIUM 1 TABLET: 50; 8.6 TABLET ORAL at 20:00

## 2024-01-02 RX ADMIN — FAMOTIDINE 20 MG: 20 TABLET ORAL at 20:00

## 2024-01-02 RX ADMIN — AMIODARONE HYDROCHLORIDE 200 MG: 200 TABLET ORAL at 09:20

## 2024-01-02 RX ADMIN — ASPIRIN 81 MG CHEWABLE TABLET 81 MG: 81 TABLET CHEWABLE at 09:20

## 2024-01-02 RX ADMIN — LEVOTHYROXINE SODIUM 175 MCG: 175 TABLET ORAL at 09:20

## 2024-01-02 RX ADMIN — PREGABALIN 200 MG: 100 CAPSULE ORAL at 20:00

## 2024-01-02 RX ADMIN — ALLOPURINOL 300 MG: 300 TABLET ORAL at 09:19

## 2024-01-02 RX ADMIN — OXYCODONE HYDROCHLORIDE 15 MG: 5 TABLET ORAL at 04:00

## 2024-01-02 RX ADMIN — MENTHOL 1 PATCH: 205.5 PATCH TOPICAL at 23:03

## 2024-01-02 RX ADMIN — METHOCARBAMOL 500 MG: 500 TABLET ORAL at 17:17

## 2024-01-02 RX ADMIN — OXYCODONE HYDROCHLORIDE 20 MG: 10 TABLET ORAL at 09:18

## 2024-01-02 RX ADMIN — FUROSEMIDE 20 MG: 20 TABLET ORAL at 09:19

## 2024-01-02 RX ADMIN — FAMOTIDINE 20 MG: 20 TABLET ORAL at 09:20

## 2024-01-02 RX ADMIN — METHOCARBAMOL 500 MG: 500 TABLET ORAL at 09:20

## 2024-01-02 RX ADMIN — ACETAMINOPHEN 975 MG: 325 TABLET, FILM COATED ORAL at 17:16

## 2024-01-02 RX ADMIN — OXYCODONE HYDROCHLORIDE 20 MG: 10 TABLET ORAL at 13:26

## 2024-01-02 RX ADMIN — ATORVASTATIN CALCIUM 80 MG: 80 TABLET, FILM COATED ORAL at 23:03

## 2024-01-02 RX ADMIN — FOLIC ACID 1 MG: 1 TABLET ORAL at 09:19

## 2024-01-02 RX ADMIN — ACETAMINOPHEN 975 MG: 325 TABLET, FILM COATED ORAL at 23:03

## 2024-01-02 RX ADMIN — PREGABALIN 400 MG: 100 CAPSULE ORAL at 09:20

## 2024-01-02 RX ADMIN — METHOCARBAMOL 500 MG: 500 TABLET ORAL at 13:26

## 2024-01-02 RX ADMIN — POLYETHYLENE GLYCOL 3350 17 G: 17 POWDER, FOR SOLUTION ORAL at 09:23

## 2024-01-02 ASSESSMENT — ACTIVITIES OF DAILY LIVING (ADL)
ADLS_ACUITY_SCORE: 37
ADLS_ACUITY_SCORE: 42
ADLS_ACUITY_SCORE: 37

## 2024-01-02 NOTE — PROGRESS NOTES
Care Management Follow Up    Length of Stay (days): 7    Expected Discharge Date: 1/3/24     Concerns to be Addressed: Discharge planning     Patient plan of care discussed at interdisciplinary rounds: Yes    Anticipated Discharge Disposition: Acute Rehab, Transitional Care     Anticipated Discharge Services: None  Anticipated Discharge DME: [facility]    Patient/family educated on Medicare website which has current facility and service quality ratings: Yes  Education Provided on the Discharge Plan: Yes  Patient/Family in Agreement with the Plan: Yes    Referrals Placed by CM/SW: External Care Coordination  Private pay costs discussed: Not applicable    Additional Information:  Social work stopped by patients room to discuss options for placement. Met with patient and wife Blessing at bedside. Explained that I have been in contact with  ARU liaison who needs to review patient for ARU placement. Wife Blessing shares that they now have a preference for  ARU vs a community TCU as they would like patient to receive the most therapy that he can. Additionally, I let Emile and wife know that [facility] is also reviewing for placement and that I have a voicemail out to them awaiting follow up. Will update patient and wife as I hear back from  rehab and [facility]. It does not sound likely that  will have a bed today if accepted.     Addendum 11:48 AM:  Patient accepted to [facility] for TCU. They will have a private room available tomorrow. Stopped by patients room to update him and wife. Social work continues to await to hear back from  ARU on when they anticipate there will be a bed.     Addendum 1:58 PM:  Social work informed by Cheryl at  rehab that they would anticipate a bed for patient on 1/4 as long as he is still meeting criteria. Informed patient and wife of this. They are taking time to decide what level of care they prefer (TCU vs ARU). Will connect with them within the hour for a decision.      Addendum 3:37 PM:  Patient and wife in agreement with discharge to [facility] tomorrow. Updated them on the cost of a private room ($45) as well as that there is active covid at the TCU. Patient and family are okay with this. The earliest a bed will be available tomorrow is 4pm. Discussed options for a ride. Wife would prefer patient take a wheelchair ride. SW explained the cost which wife Blessing was okay with. Ride has been set up through [facility] with the service window of 3:30-4:15pm. Social work called wife Blessing by phone to update her. Answered general questions about discharge to TCU. Blessing had questions about how many covid cases there were at the facility. SW encouraged her to reach out to the facility to ask about this and inquire about the facilities protocol as I was not sure. Updated [facility] rehab that they can take patient off the list as he will be discharging to TCU tomorrow instead. Patient will need a PAS completed prior to discharge. Social work continues to follow.     Accepted:  [facility]  [address] [place] [address]  Phone: [phone]  Fax: [phone]    XOCHILT Orellana, LGSW  5 Med Surg   [facility]  Phone: [phone]  Pager: [phone]

## 2024-01-02 NOTE — CONSULTS
Pain Service Consultation Note  [facility]      Patient Name: Emile Wood  MRN: [id number]   Age: 73 year old  Sex: male  Date: January 2, 2024                                         Please see progress note on 1/2/24 as requested by primary service in follow up. Pt was seen in consultation earlier on this admission.    Elise Costello PA-C

## 2024-01-02 NOTE — PLAN OF CARE
Problem: Adult Inpatient Plan of Care  Goal: Absence of Hospital-Acquired Illness or Injury  Intervention: Identify and Manage Fall Risk  Recent Flowsheet Documentation  Taken 1/2/2024 0100 by Sharee Lund RN  Safety Promotion/Fall Prevention:   increase visualization of patient   lighting adjusted   clutter free environment maintained   assistive device/personal items within reach  Intervention: Prevent Skin Injury  Recent Flowsheet Documentation  Taken 1/2/2024 0100 by Sharee Lund RN  Body Position:   turned   supine, head elevated  Intervention: Prevent and Manage VTE (Venous Thromboembolism) Risk  Recent Flowsheet Documentation  Taken 1/2/2024 0100 by Sharee Lund RN  VTE Prevention/Management: SCDs (sequential compression devices) off  Intervention: Prevent Infection  Recent Flowsheet Documentation  Taken 1/2/2024 0100 by Sharee Lund RN  Infection Prevention:   rest/sleep promoted   single patient room provided   hand hygiene promoted   Goal Outcome Evaluation: Reviewed with the pt.    Received alert and oriented x4, in room air  Complained of back pain- surgical site, Tylenol, Robaxin & Oxycodone given. Applied cold packs  Surgical dressing is intact, no drainage  Assisted turning to side  Ambulated to bathroom, using walker and gait belt  Voided without difficulty  Passed gas, last BM 1/1  No acute changes on this shift.

## 2024-01-02 NOTE — PROGRESS NOTES
Pain Service Progress Note  [facility]  Date: 01/02/2024       Patient Name: Emile Wood  MRN: [id number]  Age: 73 year old  Sex: male      Visit/Communication Style   Virtual (Video) communication was used to evaluate Emile.  Emile consented to the use of video communication.  Video START time: 1057, 1/2/2024  Video STOP time: 1105, 1/2/2024   Patient's location: [facility] MED SURG  Provider's location during the visit: [facility]           Assessment:  Emile Wood is a 73 year old male patient with a past medical history significant for persistent atrial fibrillation (cardioversion x 3 attempts), CAD s/p CABG 2002 and s/p PCI to proximal to mid LCx with overlapping 3.5 x 16 mm and 3.0 x 28 mm Synergy BLANCA and OM2 with 3.0 x 12 mm Synergy to OM2 on 3/3/18 in setting of STEMI, diastolic heart failure, severe aortic stenosis s/p 29 mm Hartman Jovany 3 bioprosthetic TAVR 5/26/21, and SSS s/p PPM after TAVR, bleed risk (LLE hematoma after a fall) so 7/13/23 he underwent successful left atrial appendage closure with 35 mm Watchman FLX device. Additionally, history of HTN, HLD, PVD, carotid artery stenosis (<50% stenosis R & L internal carotid arteries 2019), mild 4.2cm fusiform dilation of the ascending thoracic aorta, hypothyroidism, former smoker, gout, alcoholic neuropathy with current alcohol dependence (4 beers per day), cocaine abuse in remission, depression, chronic opiate use, obesity, prediabetes, s/p LTKA, flatback syndrome, chronic low back pain, L2-S1 decompression (2016), TLIF L2-3 (2022) who was admitted to [facility] after undergoing posterior spinal fusion T12-S1, bilateral pelvic fixation on 12/26/23 at [place] with Dr. Zavaleta.     Pain service was consulted to assist with pain management.    Emile was seen via video with family and RN at the bedside. Reports pain has improved since this morning.  States pain is 9/10.  States PT increased pain but was  "able to walk.  We discussed using a lower dose of opioids but with more frequency for steady pain relief in order to minimize IV opioid usage for breakthrough.    The pain plan below was discussed and he is agreeable.    Plan/Recommendations:  Change oxycodone to 10-15mg PO Q 4 hours PRN.  (PTA was on Percocet 10/325mg 3-4x/day). (Yesterday used 20mg, 20mg, 15mg).   To taper,   recommend oxycodone 10-15mg PO Q 4 hours PRN x 1-2 days then decrease to Q 6 hours PRN x 1-2days then to Q 8 hours PRN then to baseline oxycodone 10mg PO Q 8 hours PRN   Stop IV Dilaudid.  Start lidocaine patches  Start menthol patches  Bowel regimen.  Acetaminophen 975mg PO Q 8 hours.    Pain Service will continue to follow.    Discussed with attending anesthesiologist    Elise Costello PA-C  01/02/2024       Diet: Advance Diet as Tolerated: Regular Diet Adult  Snacks/Supplements Adult: Zachery; With Meals  Discharge Instruction - Regular Diet Adult    Relevant Labs:  Recent Labs   Lab Test 01/01/24  0822 12/27/23  0619 12/26/23  0642   INR  --   --  1.05   PLT  --   --  259   PTT  --   --  32   BUN 13.0   < > 20.6    < > = values in this interval not displayed.       Physical Exam:  Vitals: BP (!) 108/93 (BP Location: Left arm)   Pulse 62   Temp 98.2  F (36.8  C) (Oral)   Resp 18   Ht 1.651 m (5' 5\")   Wt 104.5 kg (230 lb 6.1 oz)   SpO2 93%   BMI 38.34 kg/m      Physical Exam:   CONSTITUTIONAL/GENERAL APPEARANCE: Conversant.  EYES: EOMI, sclerae clear  RESPIRATORY: on room air  CARDIOVASCULAR: HR within normal limits  GI:round  MUSCULOSKELETAL/BACK/SPINE/EXTREMITIES: Moving UE  independently.     NEURO:  AAOx3.   SKIN/VASCULAR EXAM:  Dry and warm.  PSYCHIATRIC/BEHAVIORAL/OBSERVATIONS:  No objective signs of pain observed during our interview.   Judgment/Insight -fair   Orientation - x3   Memory -fair   Mood and affect - calm, pleasant, cooperative         Relevant Medications:  Current Pain Medications:  Medications related to Pain " Management (From now, onward)      Start     Dose/Rate Route Frequency Ordered Stop    12/29/23 0000  oxyCODONE (ROXICODONE) 10 MG tablet         15-20 mg Oral EVERY 4 HOURS PRN 12/29/23 1255      12/29/23 0000  polyethylene glycol (MIRALAX) 17 GM/Dose powder         17 g Oral DAILY 12/29/23 1255      12/28/23 1500  acetaminophen (TYLENOL) tablet 975 mg         975 mg Oral EVERY 8 HOURS 12/28/23 1104      12/28/23 1400  oxyCODONE (ROXICODONE) tablet 15 mg        See Hyperspace for full Linked Orders Report.    15 mg Oral EVERY 4 HOURS PRN 12/28/23 1056      12/28/23 1400  oxyCODONE IR (ROXICODONE) tablet 20 mg        See Hyperspace for full Linked Orders Report.    20 mg Oral EVERY 4 HOURS PRN 12/28/23 1056      12/28/23 1200  methocarbamol (ROBAXIN) tablet 500 mg         500 mg Oral 4 TIMES DAILY 12/28/23 1012      12/27/23 2200  amitriptyline (ELAVIL) tablet 10 mg        Note to Pharmacy: PTA Sig:Take 10 mg by mouth at bedtime      10 mg Oral AT BEDTIME 12/26/23 2038 12/27/23 1200  aspirin (ASA) chewable tablet 81 mg         81 mg Oral DAILY 12/27/23 0735      12/27/23 0800  polyethylene glycol (MIRALAX) Packet 17 g         17 g Oral DAILY 12/26/23 2038 12/27/23 0800  pregabalin (LYRICA) capsule 400 mg        Note to Pharmacy: PTA Sig:Take 200-400 mg by mouth 2 times daily Take 2 capsules in the morning and  one capsule in the evening      400 mg Oral EVERY MORNING 12/26/23 2038 12/26/23 2100  senna-docusate (SENOKOT-S/PERICOLACE) 8.6-50 MG per tablet 1 tablet         1 tablet Oral 2 TIMES DAILY 12/26/23 2038 12/26/23 2100  pregabalin (LYRICA) capsule 200 mg         200 mg Oral EVERY EVENING 12/26/23 2038 12/26/23 2038  magnesium hydroxide (MILK OF MAGNESIA) suspension 30 mL         30 mL Oral DAILY PRN 12/26/23 2038      12/26/23 2038  bisacodyl (DULCOLAX) suppository 10 mg         10 mg Rectal DAILY PRN 12/26/23 2038 12/26/23 2038  lidocaine 1 % 0.1-1 mL         0.1-1 mL Other EVERY  "1 HOUR PRN 12/26/23 2038 12/26/23 2038  lidocaine (LMX4) cream          Topical EVERY 1 HOUR PRN 12/26/23 2038              Primary Service Contacted with Recommendations? Yes            Acute Inpatient Pain Service [facility]  Hours of pain coverage 24/7   Page via elarm- Please Page the Pain Team Via [facility]: \"PAIN MANAGEMENT ACUTE INPATIENT/ [facility]\"            "

## 2024-01-02 NOTE — PROGRESS NOTES
[facility]    Medicine Progress Note - Hospitalist Service, GOLD TEAM 18    Date of Admission:  12/26/2023    Assessment & Plan     Emile Wood is a 73 year old male patient with a past medical history significant for persistent atrial fibrillation (cardioversion x 3 attempts), CAD s/p CABG 2002 and s/p PCI to proximal to mid LCx with overlapping 3.5 x 16 mm and 3.0 x 28 mm Synergy BLANCA and OM2 with 3.0 x 12 mm Synergy to OM2 on 3/3/18 in setting of STEMI, diastolic heart failure, severe aortic stenosis s/p 29 mm Hartman Jovany 3 bioprosthetic TAVR 5/26/21, and SSS s/p PPM after TAVR, bleed risk (LLE hematoma after a fall) so 7/13/23 he underwent successful left atrial appendage closure with 35 mm Watchman FLX device. Additionally, history of HTN, HLD, PVD, carotid artery stenosis (<50% stenosis R & L internal carotid arteries 2019), mild 4.2cm fusiform dilation of the ascending thoracic aorta, hypothyroidism, former smoker, gout, alcoholic neuropathy with current alcohol dependence (4 beers per day), cocaine abuse in remission, depression, chronic opiate use, obesity, prediabetes, s/p LTKA, flatback syndrome, chronic low back pain, L2-S1 decompression (2016), TLIF L2-3 (2022) who was admitted to [facility] after undergoing posterior spinal fusion T12-S1, bilateral pelvic fixation on 12/26/23 at [place] with Dr. Zavaleta.     S/p Posterior Spinal Fusion T12-S1 (12/26/23), POD#2  Ortho primary. EBL: 1000cc.    - Management per primary orthopedics team including pain control, activity, antibiotics,   DVT proph mechanical     Intermittent confusion  C/w metabolic encephalopathy, likely secondary to medications, surgery, disrupted sleep.  Mental status improved this morning  - back to baseline      Persistent Atrial Fibrillation s/p Watchman (7/13/2023)  CAD s/p 2-vessel CABG 2002 (LIMA-LAD, radial-rPDA) and PCI x 2 BLANCA (2018)  Severe Aortic Stenosis s/p TAVR  "(2021)  SSS s/p PPM (2021)  Chronic Diastolic Heart Failure (EF 60% 4/2023)  HTN and HLD  Mild intermittent hypoxia, likely atelectasis vs mild volume overload-improved  O2 sats now upper 90s room air  Last echocardiogram 4/21/23;TAVR with normal valve function, left ventricular ejection fraction is visually estimated at 60%, mild dilation of the aorta is present involving the ascending aorta (maximal dimension 4.4 cm).   - Cardiology requested patient to remain on ASA 81mg and is back on it    - Continue PTA Amiodarone 200mg daily ( - Continue PTA Lipitor 80mg daily   - PTA Lasix 60 mg all in AM  Lasix had been ordered, but refused by Pt has agreed to start lasix 20 mg daily and continue with that dose for now and continue to adjust as out patient    as blood pressure  allow   On resumed Plavix and AS  BMP stable  Amlodipine remains on hold secondary to soft blood pressure  and hold mat discharge  but restart as out patient  once blood pressures allow           Alcohol Dependence  Previously drinking on average 5-6 drinks (beer, wine, hard alcohol). He weaned himself down and completely stopped drinking in preparation for procedure. Reports his last drink was ~2 weeks ago. Has never withdrawn.   - Does not need CIWA scoring    - Folate, B1, multivitamin supplements     Hypothyroidism   - Continue PTA Synthroid     Gout   - Continue PTA allopurinol     Depression   - Continue PTA Amitriptyline           Diet: Advance Diet as Tolerated: Regular Diet Adult  Snacks/Supplements Adult: Zachery; With Meals  Discharge Instruction - Regular Diet Adult    DVT Prophylaxis: Defer to primary service  Bernal Catheter: Not present  Lines: None     Cardiac Monitoring: None  Code Status: Full Code      Clinically Significant Risk Factors                         # Obesity: Estimated body mass index is 38.34 kg/m  as calculated from the following:    Height as of this encounter: 1.651 m (5' 5\").    Weight as of this encounter: 104.5 kg " (230 lb 6.1 oz).      # Financial/Environmental Concerns: none   # Pacemaker present       Disposition Plan             Claudine Nicholson MD  Hospitalist Service, GOLD TEAM 18  [facility]  Securely message with Bangee (more info)  Text page via Workface Paging/Directory   See signed in provider for up to date coverage information  ______________________________________________________________________    Interval History     Sitting up in chair, doing ok , gets shortness of breath  with exertion and is not unusual for him , denies any chest pain , nausea vomiting , passing gas last BM was yesterday     Wife by bedside      Physical Exam   Vital Signs: Temp: 98.2  F (36.8  C) Temp src: Oral BP: (!) 108/93 Pulse: 62   Resp: 18 SpO2: 93 % O2 Device: None (Room air)    Weight: 230 lbs 6.09 oz  General appearance: awake alert in  no apparent distress     RESPIRATORY: lungs are clear to auscultation no wheezing, or crackles    CARDIOVASCULAR:distant HS  normal S1S2 regular rate and rhythm, pas pacemaker in left upper chest wall   GASTROINTESTINAL: abdomen is  soft,  non-distended, non-tender with normal bowel sounds.   SKIN: warm and dry, no rashes, no mottling noted   NEUROLOGIC: awake alert and oriented   EXTREMITIES: no clubbing cyanosis, tr edema noted  moves all extremities     Data         Imaging results reviewed over the past 24 hrs:   No results found for this or any previous visit (from the past 24 hour(s)).

## 2024-01-02 NOTE — PROGRESS NOTES
"Orthopaedic Surgery Progress Note:  01/02/2024     Subjective:   NAEO. Pain present this morning but resting comfortably. Will discuss patient with pain service today. Otherwise, voiding. Tolerating diet.     Objective:   /54 (BP Location: Right arm)   Pulse 76   Temp 98.5  F (36.9  C) (Oral)   Resp 18   Ht 1.651 m (5' 5\")   Wt 104.5 kg (230 lb 6.1 oz)   SpO2 98%   BMI 38.34 kg/m    No intake/output data recorded.  Gen: NAD.  Resp: Breathing comfortably on RA  Drain: previously removed  Musculoskeletal: dressing c/d/I.    Lower extremities:  Motor Strength Right Left   Hip flexion: L1, L2, L3 5/5 5/5   Hip adduction: L2, L3 5/5 5/5   Knee flexion: S1 5/5 5/5   Knee extension: L3, L4 5/5 5/5   Ankle dosiflexion: L4, L5 5/5 5/5   EHL: L5 5/5 5/5   Ankle plantarflexion: S1 5/5 5/5     Sensation from L1-S2 is preserved although decreased at baseline 2/2 to neuropathy.     Labs:  Lab Results   Component Value Date    WBC 7.4 12/26/2023    HGB 10.8 (L) 12/27/2023     12/26/2023    INR 1.05 12/26/2023        Assessment & Plan:   Emile Wood is a 73 year old male with now s/p PSIF T12-S1 on 12/26 with Dr. Zavaleta.     Goals for today:  - PT  - Postop XR today  - safe dispo planning - plan for TCU, appreciate SW assistance    Ortho Primary  Activity: Up with assist until independent. No excessive bending or twisting. No lifting >10 lbs x 6 weeks. No Sharri lift for transfers.   Weight bearing status: WBAT.  Pain management: Transition from IV to PO as tolerated.    Antibiotics: Ancef discontinued  Diet: Begin with clear fluids and progress diet as tolerated. Zachery nutritional supplement twice daily   DVT prophylaxis: SCDs only. Preop ASA and plavix.  Imaging: XR Upright L spine PTDC - ordered.  Labs: hgb stable  Bracing/Splinting: None.  Dressings: Keep clean, dry and intact. Patient may shower with bandage in place. Dressing to be removed 2 weeks after surgery. Please change or reinforce as " necessary for strikethrough or saturation.   Drains: previously removed  Bernal catheter: Removed POD#1.   Physical Therapy/Occupational Therapy: Eval and treat.  Consults: Hospitalist.  Follow-up: Clinic with Dr. Zavaleta in 6 weeks with repeat x-rays.   Disposition: Pending progress with therapies, pain control on orals, post-op imaging, and drain removal.     Orthopaedics surgery staff for this patient is Dr. Zavaleta.     ------------------------------------------------------------------------------------------     [  x ] Drains removed.  [   ] Post xrays done.  [   ] Discharge orders done.  [   ] Discharge summary started.    Diego Fofana MD  Orthopedic Surgery, PGY-4    FOLLOWUP:    Future Appointments   Date Time Provider Department [place]   12/27/2023  2:30 PM Geni Faith, PT [facility] [place]   12/27/2023  4:30 PM UR OT WAITLIST [facility] [place]   2/7/2024 11:20 AM Nacho Zavaleta MD [facility]

## 2024-01-02 NOTE — PLAN OF CARE
End of shift Summary: See flowsheet for VS and detail assessments.     Changes this Shift:     Neuro/CMS: Pt is A&Ox4. Pt is able to make needs known. Pt denies headache and nausea/vomiting. Pt has baseline general numbness and tingling sensation due to neuropathy.     Pulmonary: Pt on room air. Lung sound clear and equal bilaterally. Pt denies chest pain and SOB.      Output: Pt is continent of bowel and bladder. At the beginning of the shift, pt had incontinence of bladder due to discomfort with primofit. Primofit removed and perineal care and linen change given. Pt had smear of bowel movement today.      Activity:Pt ambulates to the bathroom asst of 1 with walker and gait belt.      Skin: Aquacel dressing for spinal incision CDI. Pt has scattered bruising on both arms.      Pain: Pt rates pain 8-10/10; managed with PRN IV dilaudid, oral oxycodone and scheduled tylenol and robaxin.      IV: R PIV and L PIV saline locked and patent.      Plan: Continue Plan of Care

## 2024-01-03 ENCOUNTER — APPOINTMENT (OUTPATIENT)
Dept: PHYSICAL THERAPY | Facility: CLINIC | Age: 74
DRG: 456 | End: 2024-01-03
Attending: ORTHOPAEDIC SURGERY
Payer: MEDICARE

## 2024-01-03 VITALS
DIASTOLIC BLOOD PRESSURE: 71 MMHG | OXYGEN SATURATION: 98 % | SYSTOLIC BLOOD PRESSURE: 148 MMHG | RESPIRATION RATE: 18 BRPM | WEIGHT: 230.38 LBS | BODY MASS INDEX: 38.38 KG/M2 | TEMPERATURE: 98.1 F | HEIGHT: 65 IN | HEART RATE: 71 BPM

## 2024-01-03 LAB — GLUCOSE BLDC GLUCOMTR-MCNC: 115 MG/DL (ref 70–99)

## 2024-01-03 PROCEDURE — 250N000013 HC RX MED GY IP 250 OP 250 PS 637: Performed by: NURSE PRACTITIONER

## 2024-01-03 PROCEDURE — 250N000013 HC RX MED GY IP 250 OP 250 PS 637: Performed by: PHYSICIAN ASSISTANT

## 2024-01-03 PROCEDURE — 99232 SBSQ HOSP IP/OBS MODERATE 35: CPT | Performed by: INTERNAL MEDICINE

## 2024-01-03 PROCEDURE — 250N000013 HC RX MED GY IP 250 OP 250 PS 637: Performed by: INTERNAL MEDICINE

## 2024-01-03 PROCEDURE — 99232 SBSQ HOSP IP/OBS MODERATE 35: CPT | Mod: 95 | Performed by: PHYSICIAN ASSISTANT

## 2024-01-03 PROCEDURE — 97116 GAIT TRAINING THERAPY: CPT | Mod: GP

## 2024-01-03 PROCEDURE — 250N000013 HC RX MED GY IP 250 OP 250 PS 637

## 2024-01-03 PROCEDURE — 97530 THERAPEUTIC ACTIVITIES: CPT | Mod: GP

## 2024-01-03 RX ORDER — AMLODIPINE BESYLATE 5 MG/1
5 TABLET ORAL DAILY
Start: 2024-01-03

## 2024-01-03 RX ORDER — OXYCODONE HYDROCHLORIDE 10 MG/1
10-15 TABLET ORAL EVERY 4 HOURS PRN
Qty: 31 TABLET | Refills: 0 | Status: SHIPPED | OUTPATIENT
Start: 2024-01-03 | End: 2024-01-29

## 2024-01-03 RX ORDER — AMLODIPINE BESYLATE 5 MG/1
5 TABLET ORAL DAILY
Status: DISCONTINUED | OUTPATIENT
Start: 2024-01-03 | End: 2024-01-03 | Stop reason: HOSPADM

## 2024-01-03 RX ORDER — PREGABALIN 200 MG/1
CAPSULE ORAL
Qty: 10 CAPSULE | Refills: 0 | Status: SHIPPED | OUTPATIENT
Start: 2024-01-03

## 2024-01-03 RX ADMIN — METHOCARBAMOL 500 MG: 500 TABLET ORAL at 11:20

## 2024-01-03 RX ADMIN — AMIODARONE HYDROCHLORIDE 200 MG: 200 TABLET ORAL at 08:02

## 2024-01-03 RX ADMIN — POLYETHYLENE GLYCOL 3350 17 G: 17 POWDER, FOR SOLUTION ORAL at 07:59

## 2024-01-03 RX ADMIN — LEVOTHYROXINE SODIUM 175 MCG: 175 TABLET ORAL at 08:01

## 2024-01-03 RX ADMIN — ALLOPURINOL 300 MG: 300 TABLET ORAL at 07:59

## 2024-01-03 RX ADMIN — FUROSEMIDE 20 MG: 20 TABLET ORAL at 08:02

## 2024-01-03 RX ADMIN — ASPIRIN 81 MG CHEWABLE TABLET 81 MG: 81 TABLET CHEWABLE at 07:58

## 2024-01-03 RX ADMIN — ACETAMINOPHEN 975 MG: 325 TABLET, FILM COATED ORAL at 07:59

## 2024-01-03 RX ADMIN — SENNOSIDES AND DOCUSATE SODIUM 1 TABLET: 50; 8.6 TABLET ORAL at 07:59

## 2024-01-03 RX ADMIN — AMLODIPINE BESYLATE 5 MG: 5 TABLET ORAL at 13:35

## 2024-01-03 RX ADMIN — METHOCARBAMOL 500 MG: 500 TABLET ORAL at 08:00

## 2024-01-03 RX ADMIN — PREGABALIN 400 MG: 100 CAPSULE ORAL at 07:59

## 2024-01-03 RX ADMIN — OXYCODONE HYDROCHLORIDE 10 MG: 10 TABLET ORAL at 06:31

## 2024-01-03 RX ADMIN — OXYCODONE HYDROCHLORIDE 15 MG: 5 TABLET ORAL at 11:20

## 2024-01-03 RX ADMIN — CLOPIDOGREL BISULFATE 75 MG: 75 TABLET ORAL at 08:01

## 2024-01-03 RX ADMIN — Medication 1 TABLET: at 07:58

## 2024-01-03 RX ADMIN — FAMOTIDINE 20 MG: 20 TABLET ORAL at 08:00

## 2024-01-03 RX ADMIN — FOLIC ACID 1 MG: 1 TABLET ORAL at 07:59

## 2024-01-03 ASSESSMENT — ACTIVITIES OF DAILY LIVING (ADL)
ADLS_ACUITY_SCORE: 38
ADLS_ACUITY_SCORE: 39
ADLS_ACUITY_SCORE: 37
ADLS_ACUITY_SCORE: 38
ADLS_ACUITY_SCORE: 37

## 2024-01-03 NOTE — PLAN OF CARE
Pt. discharged at 4:17 PM to [place], and left with personal belongings. Pt. received complete discharge paperwork and   medications will be filled by facility.  Discharge teaching includedmedication, pain management, activity restrictions, dressing changes, and signs and symptoms of infection.  had no further questions at the time of discharge and no unmet needs were identified.

## 2024-01-03 NOTE — PLAN OF CARE
Occupational Therapy Discharge Summary    Reason for therapy discharge:    Discharged to transitional care facility.    Progress towards therapy goal(s). See goals on Care Plan in [facility] electronic health record for goal details.  Goals partially met.  Barriers to achieving goals:   discharge from facility.    Therapy recommendation(s):    Continued therapy is recommended.  Rationale/Recommendations:  to maximize safety and I with ADL.

## 2024-01-03 NOTE — PROGRESS NOTES
Pain Service Progress Note  [facility]  Date: 01/03/2024       Patient Name: Emile Wood  MRN: [id number]  Age: 73 year old  Sex: male      Visit/Communication Style   Virtual (Video) communication was used to evaluate Emile.  Emile consented to the use of video communication.  Video START time: 1037, 1/3/2024  Video STOP time: 1051, 1/3/2024   Patient's location: [facility] MED SURG  Provider's location during the visit: [facility]           Assessment:    Emile Wood is a 73 year old male patient with a past medical history significant for persistent atrial fibrillation (cardioversion x 3 attempts), CAD s/p CABG 2002 and s/p PCI to proximal to mid LCx with overlapping 3.5 x 16 mm and 3.0 x 28 mm Synergy BLANCA and OM2 with 3.0 x 12 mm Synergy to OM2 on 3/3/18 in setting of STEMI, diastolic heart failure, severe aortic stenosis s/p 29 mm Hartman Jovany 3 bioprosthetic TAVR 5/26/21, and SSS s/p PPM after TAVR, bleed risk (LLE hematoma after a fall) so 7/13/23 he underwent successful left atrial appendage closure with 35 mm Watchman FLX device. Additionally, history of HTN, HLD, PVD, carotid artery stenosis (<50% stenosis R & L internal carotid arteries 2019), mild 4.2cm fusiform dilation of the ascending thoracic aorta, hypothyroidism, former smoker, gout, alcoholic neuropathy with current alcohol dependence (4 beers per day), cocaine abuse in remission, depression, chronic opiate use, obesity, prediabetes, s/p LTKA, flatback syndrome, chronic low back pain, L2-S1 decompression (2016), TLIF L2-3 (2022) who was admitted to [facility] after undergoing posterior spinal fusion T12-S1, bilateral pelvic fixation on 12/26/23 at [place] with Dr. Zavaleta.      Pain service was consulted to assist with pain management.    Emile is lying in bed.  Reports pain in the hips, exacerbated with PT earlier this morning.  He is due for a dose of oxycodone and reminded to put on call light  "for pain medication.  Is planning to discharge to TCU later this afternoon.      He reports poor sleep since admission--had used acetaminophen PM at home for sleep with benefits.  We discussed pain management and goal to taper to his baseline regimen as healing takes place. He is agreeable with the pain plan below.    Plan/Recommendations:   oxycodone to 10-15mg PO Q 4 hours PRN.  (PTA was on Percocet 10/325mg 3-4x/day, mostly 3x/day). (Yesterday used 20mg, 20mg, 15mg).              To taper,              recommend oxycodone 10-15mg PO Q 4 hours PRN x 1-2 days then decrease to Q 6 hours PRN x 1-2days then to Q 8 hours PRN then to baseline oxycodone 10mg PO Q 8 hours PRN     Start lidocaine patches  Start menthol patches  Bowel regimen.  Acetaminophen 975mg PO Q 8 hours.   Consider sleeping agent---(at home states he was using acetaminophen 2 tabs of 500mg acetaminophen pm for sleep).    Pain Service will sign off.    Discussed with attending anesthesiologist    Elise Costello PA-C  01/03/2024       Diet: Advance Diet as Tolerated: Regular Diet Adult  Snacks/Supplements Adult: Zachery; With Meals  Discharge Instruction - Regular Diet Adult  Diet    Relevant Labs:  Recent Labs   Lab Test 01/01/24  0822 12/27/23  0619 12/26/23  0642   INR  --   --  1.05   PLT  --   --  259   PTT  --   --  32   BUN 13.0   < > 20.6    < > = values in this interval not displayed.       Physical Exam:  Vitals: BP (!) 148/71 (BP Location: Right arm)   Pulse 71   Temp 98.1  F (36.7  C) (Oral)   Resp 18   Ht 1.651 m (5' 5\")   Wt 104.5 kg (230 lb 6.1 oz)   SpO2 98%   BMI 38.34 kg/m      Physical Exam:   CONSTITUTIONAL/GENERAL APPEARANCE: Conversant.  EYES: EOMI, sclerae clear  RESPIRATORY:non labored breathing, on room air  CARDIOVASCULAR: HR within normal limits  GI:round  MUSCULOSKELETAL/BACK/SPINE/EXTREMITIES: Moving UE independently.     NEURO:  AAOx3.   SKIN/VASCULAR EXAM:  Dry and warm.  PSYCHIATRIC/BEHAVIORAL/OBSERVATIONS:  No " objective signs of pain observed during our interview.   Judgment/Insight -fair   Orientation - x3   Memory -fair   Mood and affect - calm, pleasant, cooperative         Relevant Medications:  Current Pain Medications:  Medications related to Pain Management (From now, onward)      Start     Dose/Rate Route Frequency Ordered Stop    01/03/24 0800  Lidocaine (LIDOCARE) 4 % Patch 1 patch         1 patch  over 12 Hours Transdermal EVERY 24 HOURS 0800 01/02/24 1522      01/02/24 1518  oxyCODONE IR (ROXICODONE) tablet 10 mg        See Hyperspace for full Linked Orders Report.    10 mg Oral EVERY 4 HOURS PRN 01/02/24 1522      01/02/24 1518  oxyCODONE (ROXICODONE) tablet 15 mg        See Hyperspace for full Linked Orders Report.    15 mg Oral EVERY 4 HOURS PRN 01/02/24 1522      01/02/24 0000  methocarbamol (ROBAXIN) 500 MG tablet         500 mg Oral 4 TIMES DAILY 01/02/24 1224      01/02/24 0000  Lidocaine (LIDOCARE) 4 % Patch         1 patch Transdermal EVERY 24 HOURS 01/02/24 1523      12/29/23 0000  polyethylene glycol (MIRALAX) 17 GM/Dose powder         17 g Oral DAILY 12/29/23 1255      12/28/23 1500  acetaminophen (TYLENOL) tablet 975 mg         975 mg Oral EVERY 8 HOURS 12/28/23 1104      12/28/23 1200  methocarbamol (ROBAXIN) tablet 500 mg         500 mg Oral 4 TIMES DAILY 12/28/23 1012      12/27/23 2200  amitriptyline (ELAVIL) tablet 10 mg        Note to Pharmacy: PTA Sig:Take 10 mg by mouth at bedtime      10 mg Oral AT BEDTIME 12/26/23 2038 12/27/23 1200  aspirin (ASA) chewable tablet 81 mg         81 mg Oral DAILY 12/27/23 0735      12/27/23 0800  polyethylene glycol (MIRALAX) Packet 17 g         17 g Oral DAILY 12/26/23 2038 12/27/23 0800  pregabalin (LYRICA) capsule 400 mg        Note to Pharmacy: PTA Sig:Take 200-400 mg by mouth 2 times daily Take 2 capsules in the morning and  one capsule in the evening      400 mg Oral EVERY MORNING 12/26/23 2038 12/26/23 2100  senna-docusate  "(SENOKOT-S/PERICOLACE) 8.6-50 MG per tablet 1 tablet         1 tablet Oral 2 TIMES DAILY 12/26/23 2038 12/26/23 2100  pregabalin (LYRICA) capsule 200 mg         200 mg Oral EVERY EVENING 12/26/23 2038 12/26/23 2038  magnesium hydroxide (MILK OF MAGNESIA) suspension 30 mL         30 mL Oral DAILY PRN 12/26/23 2038 12/26/23 2038  bisacodyl (DULCOLAX) suppository 10 mg         10 mg Rectal DAILY PRN 12/26/23 2038 12/26/23 2038  lidocaine 1 % 0.1-1 mL         0.1-1 mL Other EVERY 1 HOUR PRN 12/26/23 2038 12/26/23 2038  lidocaine (LMX4) cream          Topical EVERY 1 HOUR PRN 12/26/23 2038              Primary Service Contacted with Recommendations? Yes            Acute Inpatient Pain Service [facility]  Hours of pain coverage 24/7   Page via Am[employer]- Please Page the Pain Team Via [employer]: \"PAIN MANAGEMENT ACUTE INPATIENT/ [facility]\"            "

## 2024-01-03 NOTE — PROGRESS NOTES
"Orthopaedic Surgery Progress Note:  01/03/2024     Subjective:   NAEO. Pain continues, however resting comfortably. Plan for discharge to TCU today.    Objective:   /53 (BP Location: Right arm)   Pulse 71   Temp 98.7  F (37.1  C) (Oral)   Resp 16   Ht 1.651 m (5' 5\")   Wt 104.5 kg (230 lb 6.1 oz)   SpO2 95%   BMI 38.34 kg/m    No intake/output data recorded.  Gen: NAD.  Resp: Breathing comfortably on RA  Drain: previously removed  Musculoskeletal: dressing c/d/I.    Lower extremities:  Motor Strength Right Left   Hip flexion: L1, L2, L3 5/5 5/5   Hip adduction: L2, L3 5/5 5/5   Knee flexion: S1 5/5 5/5   Knee extension: L3, L4 5/5 5/5   Ankle dosiflexion: L4, L5 5/5 5/5   EHL: L5 5/5 5/5   Ankle plantarflexion: S1 5/5 5/5     Sensation from L1-S2 is preserved although decreased at baseline 2/2 to neuropathy.     Labs:  Lab Results   Component Value Date    WBC 7.4 12/26/2023    HGB 10.8 (L) 12/27/2023     12/26/2023    INR 1.05 12/26/2023        Assessment & Plan:   Emile Wood is a 73 year old male with now s/p PSIF T12-S1 on 12/26 with Dr. Zavaleta.     Goals for today:  - Plan for discharge to TCU today    Ortho Primary  Activity: Up with assist until independent. No excessive bending or twisting. No lifting >10 lbs x 6 weeks. No Sharri lift for transfers.   Weight bearing status: WBAT.  Pain management: Transition from IV to PO as tolerated.    Antibiotics: Ancef discontinued  Diet: Begin with clear fluids and progress diet as tolerated. Zachery nutritional supplement twice daily   DVT prophylaxis: SCDs only. Preop ASA and plavix.  Imaging: XR Upright L spine PTDC - ordered.  Labs: hgb stable  Bracing/Splinting: None.  Dressings: Keep clean, dry and intact. Patient may shower with bandage in place. Dressing to be removed 2 weeks after surgery. Please change or reinforce as necessary for strikethrough or saturation.   Drains: previously removed  Bernal catheter: Removed POD#1.   Physical " Therapy/Occupational Therapy: Eval and treat.  Consults: Hospitalist.  Follow-up: Clinic with Dr. Zavaleta in 6 weeks with repeat x-rays.   Disposition: Pending progress with therapies, pain control on orals, post-op imaging, and drain removal.     Orthopaedics surgery staff for this patient is Dr. Zavaleta.     ------------------------------------------------------------------------------------------     [  x ] Drains removed.  [ x ] Post xrays done.  [ x ] Discharge orders done.  [   ] Discharge summary started.    Diego Fofana MD  Orthopedic Surgery, PGY-4    FOLLOWUP:    Future Appointments   Date Time Provider Department [place]   12/27/2023  2:30 PM Geni Faith, PT [facility] [place]   12/27/2023  4:30 PM UR OT WAITLIST [facility] [place]   2/7/2024 11:20 AM Nacho Zavaleta MD [facility]

## 2024-01-03 NOTE — PLAN OF CARE
"/48 (BP Location: Right leg)   Pulse 72   Temp 98.6  F (37  C) (Oral)   Resp 16   Ht 1.651 m (5' 5\")   Wt 104.5 kg (230 lb 6.1 oz)   SpO2 97%   BMI 38.34 kg/m       [id number]  No acute events overnight .  Pain well controlled overnight with PRN oxycodone.  SBA with walker. LBM 1/1/24. Voiding without difficulty.  Discharge to TCU today evening   "

## 2024-01-03 NOTE — PLAN OF CARE
Problem: Adult Inpatient Plan of Care  Goal: Absence of Hospital-Acquired Illness or Injury  Intervention: Identify and Manage Fall Risk  Recent Flowsheet Documentation  Taken 1/2/2024 1000 by German Zaidi RN  Safety Promotion/Fall Prevention:   increase visualization of patient   lighting adjusted   clutter free environment maintained   assistive device/personal items within reach  Intervention: Prevent and Manage VTE (Venous Thromboembolism) Risk  Recent Flowsheet Documentation  Taken 1/2/2024 1000 by German Zaidi RN  VTE Prevention/Management: SCDs (sequential compression devices) off  Intervention: Prevent Infection  Recent Flowsheet Documentation  Taken 1/2/2024 1000 by German Zaidi RN  Infection Prevention:   rest/sleep promoted   single patient room provided   hand hygiene promoted     Problem: Spinal Surgery  Goal: Absence of Bleeding  Intervention: Monitor and Manage Bleeding  Recent Flowsheet Documentation  Taken 1/2/2024 1000 by German Zaidi RN  Bleeding Management: dressing monitored  Goal: Optimal Functional Ability  Intervention: Optimize Functional Status  Recent Flowsheet Documentation  Taken 1/2/2024 1000 by German Zaidi RN  Activity Management: activity adjusted per tolerance  Positioning/Transfer Devices:   pillows   in use  Goal: Absence of Infection Signs and Symptoms  Intervention: Prevent or Manage Infection  Recent Flowsheet Documentation  Taken 1/2/2024 1000 by German Zaidi RN  Infection Prevention:   rest/sleep promoted   single patient room provided   hand hygiene promoted  Goal: Optimal Neurologic Function  Intervention: Optimize Neurologic Function  Recent Flowsheet Documentation  Taken 1/2/2024 1000 by German Zaidi RN  Range of Motion: active ROM (range of motion) encouraged  Goal: Anesthesia/Sedation Recovery  Intervention: Optimize Anesthesia Recovery  Recent Flowsheet Documentation  Taken 1/2/2024 1000 by German Zaidi RN  Safety Promotion/Fall Prevention:   increase  visualization of patient   lighting adjusted   clutter free environment maintained   assistive device/personal items within reach     Problem: Pain Acute  Goal: Optimal Pain Control and Function  Intervention: Prevent or Manage Pain  Recent Flowsheet Documentation  Taken 1/2/2024 1000 by German Zaidi RN  Medication Review/Management: medications reviewed     Problem: Fall Injury Risk  Goal: Absence of Fall and Fall-Related Injury  Intervention: Identify and Manage Contributors  Recent Flowsheet Documentation  Taken 1/2/2024 1000 by German Zaidi RN  Medication Review/Management: medications reviewed  Intervention: Promote Injury-Free Environment  Recent Flowsheet Documentation  Taken 1/2/2024 1000 by German Zaidi RN  Safety Promotion/Fall Prevention:   increase visualization of patient   lighting adjusted   clutter free environment maintained   assistive device/personal items within reach   Goal Outcome Evaluation:  No acute changes this shift. Pt remain alert and oriented x4. Still reporting 7-8/10 surgical pain; Offered PRN oxycodone with scheduled robaxin and Tylenol regimen. Pt ambulated on h/way with PT. A+1 with GB and walker. Standing x-rays completed today. [facility] up. (See SW note)

## 2024-01-03 NOTE — PROGRESS NOTES
[facility]    Medicine Progress Note - Hospitalist Service, GOLD TEAM 18    Date of Admission:  12/26/2023    Assessment & Plan     Emile Wood is a 73 year old male patient with a past medical history significant for persistent atrial fibrillation (cardioversion x 3 attempts), CAD s/p CABG 2002 and s/p PCI to proximal to mid LCx with overlapping 3.5 x 16 mm and 3.0 x 28 mm Synergy BLANCA and OM2 with 3.0 x 12 mm Synergy to OM2 on 3/3/18 in setting of STEMI, diastolic heart failure, severe aortic stenosis s/p 29 mm Hartman Jovany 3 bioprosthetic TAVR 5/26/21, and SSS s/p PPM after TAVR, bleed risk (LLE hematoma after a fall) so 7/13/23 he underwent successful left atrial appendage closure with 35 mm Watchman FLX device. Additionally, history of HTN, HLD, PVD, carotid artery stenosis (<50% stenosis R & L internal carotid arteries 2019), mild 4.2cm fusiform dilation of the ascending thoracic aorta, hypothyroidism, former smoker, gout, alcoholic neuropathy with current alcohol dependence (4 beers per day), cocaine abuse in remission, depression, chronic opiate use, obesity, prediabetes, s/p LTKA, flatback syndrome, chronic low back pain, L2-S1 decompression (2016), TLIF L2-3 (2022) who was admitted to [facility] after undergoing posterior spinal fusion T12-S1, bilateral pelvic fixation on 12/26/23 at [place] with Dr. Zavaleta.     S/p Posterior Spinal Fusion T12-S1 (12/26/23), POD#2  Ortho primary. EBL: 1000cc.    - Management per primary orthopedics team including pain control, activity, antibiotics,   DVT proph mechanical     Intermittent confusion  C/w metabolic encephalopathy, likely secondary to medications, surgery, disrupted sleep.  Mental status improved this morning  - back to baseline      Persistent Atrial Fibrillation s/p Watchman (7/13/2023)  CAD s/p 2-vessel CABG 2002 (LIMA-LAD, radial-rPDA) and PCI x 2 BLANCA (2018)  Severe Aortic Stenosis s/p TAVR  "(2021)  SSS s/p PPM (2021)  Chronic Diastolic Heart Failure (EF 60% 4/2023)  HTN and HLD  Mild intermittent hypoxia, likely atelectasis vs mild volume overload-improved  O2 sats now upper 90s room air  Last echocardiogram 4/21/23;TAVR with normal valve function, left ventricular ejection fraction is visually estimated at 60%, mild dilation of the aorta is present involving the ascending aorta (maximal dimension 4.4 cm).   - Cardiology requested patient to remain on ASA 81mg and is back on it    - Continue PTA Amiodarone 200mg daily ( - Continue PTA Lipitor 80mg daily   - PTA Lasix 60 mg all in AM  Lasix had been ordered, but refused by Pt has agreed to start lasix 20 mg daily and continue with that dose for now and continue to adjust as out patient    as blood pressure  allow   On resumed Plavix and AS  BMPs now up, restarted  amlodipine  at 5 mg for now ( PTA on 10 mg)         Alcohol Dependence  Previously drinking on average 5-6 drinks (beer, wine, hard alcohol). He weaned himself down and completely stopped drinking in preparation for procedure. Reports his last drink was ~2 weeks ago. Has never withdrawn.   - Does not need CIWA scoring    - Folate, B1, multivitamin supplements     Hypothyroidism   - Continue PTA Synthroid     Gout   - Continue PTA allopurinol     Depression   - Continue PTA Amitriptyline           Diet: Advance Diet as Tolerated: Regular Diet Adult  Snacks/Supplements Adult: Zachery; With Meals  Discharge Instruction - Regular Diet Adult    DVT Prophylaxis: Defer to primary service  Bernal Catheter: Not present  Lines: None     Cardiac Monitoring: None  Code Status: Full Code      Clinically Significant Risk Factors                         # Obesity: Estimated body mass index is 38.34 kg/m  as calculated from the following:    Height as of this encounter: 1.651 m (5' 5\").    Weight as of this encounter: 104.5 kg (230 lb 6.1 oz).        # Financial/Environmental Concerns: none   # Pacemaker " present       Disposition Plan             Claudine Nicholson MD  Hospitalist Service, GOLD TEAM 18  [facility]  Securely message with [employer] (more info)  Text page via [employer] Paging/Directory   See signed in provider for up to date coverage information  ______________________________________________________________________    Interval History     Complains of  pain in both hips started few days ago, also having back pain 9-10 , denies feeling weaker or number in legs , able to pee and had BM     No chest pain  no nausea vomiting     Physical Exam   Vital Signs: Temp: 98.7  F (37.1  C) Temp src: Oral BP: 111/53 Pulse: 71   Resp: 16 SpO2: 95 % O2 Device: None (Room air)    Weight: 230 lbs 6.09 oz  General appearance: awake alert in  no apparent distress     RESPIRATORY: lungs are clear to auscultation no wheezing, or crackles    CARDIOVASCULAR:distant HS  normal S1S2 regular rate and rhythm, pas pacemaker in left upper chest wall   GASTROINTESTINAL: abdomen is  soft,  non-distended, non-tender with normal bowel sounds.   SKIN: warm and dry, no rashes, no mottling noted   NEUROLOGIC: awake alert and oriented   EXTREMITIES: no clubbing cyanosis, tr edema noted  moves all extremities     Data         Imaging results reviewed over the past 24 hrs:   Recent Results (from the past 24 hour(s))   XR Pelvis 1/2 Views    Narrative    EXAM: XR PELVIS 1/2 VIEWS  LOCATION: [facility]  DATE: 1/2/2024    INDICATION: s p surgery  COMPARISON: None.      Impression    IMPRESSION: There are postoperative changes from lumbosacral fusion, extending beyond the field-of-view superiorly and better evaluated on the concurrent thoracic and lumbar spine radiographs from the same date. There are mild degenerative changes in   both hips.   XR Thoracic Lumbar Standing 2 Views    Narrative    EXAM: XR THORACIC LUMBAR STANDING 2 VIEWS  LOCATION: [facility]  DATE: 1/2/2024    INDICATION: s p surgery  COMPARISON: CT 10/13/2023. X-ray 09/13/2023      Impression    IMPRESSION: Patient is status post multilevel posterior stabilization T12-S1 with bilateral sacroiliac fixation screws in place. L2-L3 interbody fusion as before. There is straightening of the normal upper thoracic kyphosis and gentle lower thoracolumbar   kyphosis. Chronic mild T11 vertebral body height loss is unchanged. Vertebral body heights elsewhere in the thoracic spine otherwise appear maintained. Mild spondylosis. The heart is mildly enlarged. Central and basilar pulmonary opacities, may reflect   atelectasis. Left chest wall pacemaker. Prior median sternotomy.

## 2024-01-03 NOTE — PROGRESS NOTES
Care Management Discharge Note    Discharge Date: 01/03/24    Discharge Disposition: Transitional Care    Discharge Services: None    Discharge DME: Walker    Discharge Transportation: Agency    Private pay costs discussed: transportation costs    Does the patient's insurance plan have a 3 day qualifying hospital stay waiver?  No    PAS Confirmation Code: [id number]  Patient/family educated on Medicare website which has current facility and service quality ratings: Yes    Education Provided on the Discharge Plan: Yes  Persons Notified of Discharge Plans: Yes  Patient/Family in Agreement with the Plan: Yes    Handoff Referral Completed: Yes    Additional Information:  Patient will discharge to [facility] TCU today. Received a call from patients wife Blessing this morning. Blessing expressed some frustration for not being able to get patient admitted to  ARU. [facility] has been following since 12/28, however, there has not been a bed available. Anticipate a bed will be available tomorrow 1/4 if patient is still meeting criteria. SW met with family various times yesterday and went over this and the family had decided to pursue [facility] as they can get patient in today and it is guaranteed patient will need a TCU level of care. Asked wife what I could do to make the situation better but she stated she just wanted me to know how she was feeling about the situation. We will still plan for patient to discharge to [facility] today during the service window of 3:30-4:15pm. Awaiting orders to be completed so I can send orders.     Addendum 11:31 AM:  Orders faxed.     Addendum 1:06 PM:  Orders resent as they previously did not include PT/OT orders. Hard scripts faxed as well. No additional care management needs at this time.     Accepted:  [facility]  [address] [address]  Phone: [phone]  Fax: [phone]     XOCHILT Orellana, LGSW  5 Med Surg and 10 ICU   [facility]  [facility]  Phone: [phone]  Pager: [phone]

## 2024-01-03 NOTE — PLAN OF CARE
Problem: Adult Inpatient Plan of Care  Goal: Absence of Hospital-Acquired Illness or Injury  Intervention: Identify and Manage Fall Risk  Recent Flowsheet Documentation  Taken 1/2/2024 1953 by Kristine Gabriel, RN  Safety Promotion/Fall Prevention:   increase visualization of patient   lighting adjusted   clutter free environment maintained   assistive device/personal items within reach  Pt. Did not display any changes during my shift.  Discharge plans are in place for tomorrow evening.

## 2024-01-04 ENCOUNTER — PATIENT OUTREACH (OUTPATIENT)
Dept: CARE COORDINATION | Facility: CLINIC | Age: 74
End: 2024-01-04
Payer: MEDICARE

## 2024-01-04 NOTE — PROGRESS NOTES
[facility]    Background: Transitional Care Management program identified per system criteria and reviewed by [facility] team for possible outreach.    Assessment: Upon chart review, CCRC Team member will not proceed with patient outreach related to this episode of Transitional Care Management program due to reason below:    Non-[facility] TCU: CCRC team member noted patient discharged to TCU/ARU/LTACH. Patient is not established with a [facility] currently supported by Primary Care-Care Coordination therefore handoff to Primary Care-Care Coordination is not appropriate at this time.    Plan: Transitional Care Management episode addressed appropriately per reason noted above.      VÍCTOR Cosme  [phone]  [facility]     *Connected Care Resource Team does NOT follow patient ongoing. Referrals are identified based on internal discharge reports and the outreach is to ensure patient has an understanding of their discharge instructions.

## 2024-01-04 NOTE — PLAN OF CARE
Physical Therapy Discharge Summary    Reason for therapy discharge:    Discharged to transitional care facility.    Progress towards therapy goal(s). See goals on Care Plan in [facility] electronic health record for goal details.  Goals partially met.  Barriers to achieving goals:   discharge from facility.    Therapy recommendation(s):    Continued therapy is recommended.  Rationale/Recommendations:  progress return to safe IND mobility.

## 2024-01-07 NOTE — DISCHARGE SUMMARY
ORTHOPAEDIC SURGERY DISCHARGE SUMMARY     Date of Admission: 12/26/2023  Date of Discharge: 1/3/2024  4:30 PM  Disposition: Rehab  Staff Physician: No att. 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]  Lumbar adjacent segment disease with spondylolisthesis [M51.36, M43.16]  Chronic diastolic CHF (congestive heart failure) (H) [I50.32]  Atrial fibrillation, unspecified type (H) [I48.91]    PROCEDURES: Procedure(s):  O-arm/Stealth assisted reinstrumentation Lumbar 2-3, posterior spinal fusion Thoracic 12-Sacral 1, bilateral pelvic fixation ([place]), Fowler-Plummer Osteotomy Lumbar 4-5, Sacral 1, use of morselized local autograft, allograft bone and Bone Morphogenetic Protein on 12/26/2023    BRIEF HISTORY:  Patient is a 73 year old male multiple previous decompressive surgeries as well as L2-3 interbody fusion with posterior instrumented fusion presenting with flatback deformity, persistent lumbar stenosis, lumbar radiculopathy and low back pain.  He has had extensive nonoperative management without adequate symptomatic relief. Patient remains severely disabled due to these symptoms. After appropriate discussion of risks, alternatives and benefits of surgery the patient elected to proceed with surgery.     HOSPITAL COURSE:    The patient was admitted following the above listed procedures for pain control and rehabilitation. Emile Wood did well post-operatively. Medicine was consulted post operatively to aid in management of medical co-morbidities.  The pain medicine team is also consulted given his previous use of narcotic medication.  He did have approximately 2 days of intermittent confusion, however this improved during his hospitalization.  The patient received routine nursing cares and at the time of discharge was medically stable. Vital signs were stable throughout admission. The patient  is tolerating a regular diet and is voiding spontaneously. All PT/OT goals have been met for safe mobility. Pain is now controlled on oral medications which will be available on discharge. Stool softeners have been used while taking pain medications to help prevent constipation. Emile Wood is deemed medically safe to discharge.     Antibiotics:  Ancef given periop and was discontinued after surgical site drains were removed  DVT prophylaxis:  Mechanical, he was restarted on his aspirin and Plavix postoperatively  PT Progress:  Has met PT/OT goals for safe mobility.    Pain Meds:  Weaned off all IV pain meds by discharge.  Inpatient Events: No significant events or complications.     PHYSICAL EXAM:    Gen: NAD.  Resp: Breathing comfortably on RA  Drain: previously removed  Musculoskeletal: dressing c/d/I.     Lower extremities:  Motor Strength Right Left   Hip flexion: L1, L2, L3 5/5 5/5   Hip adduction: L2, L3 5/5 5/5   Knee flexion: S1 5/5 5/5   Knee extension: L3, L4 5/5 5/5   Ankle dosiflexion: L4, L5 5/5 5/5   EHL: L5 5/5 5/5   Ankle plantarflexion: S1 5/5 5/5      Sensation from L1-S2 is preserved although decreased at baseline 2/2 to neuropathy.     FOLLOWUP:    Follow up with Dr. Zavaleta at 6 weeks postoperatively.    Future Appointments   Date Time Provider Department Center   2/7/2024 11:20 AM Nacho Zavaleta MD [facility]       Orthopaedic Surgery appointments are at the [facility] ([address] [place] [address]). Call [phone] to schedule a follow-up appointment at this location with your provider.     PLANNED DISCHARGE ORDERS:      Discharge Medication List as of 1/3/2024  2:23 PM        START taking these medications    Details   folic acid (FOLVITE) 1 MG tablet Take 1 tablet (1 mg) by mouth daily, No Print Out      Lidocaine (LIDOCARE) 4 % Patch Place 1 patch onto the skin every 24 hours To prevent lidocaine toxicity, patient should be patch  free for 12 hrs daily.Disp-30 patch, R-0Local Print      menthol (ICY HOT) 5 % PTCH Apply 1 patch topically every 8 hours as needed for muscle soreness, Disp-30 patch, R-0, Local Print      methocarbamol (ROBAXIN) 500 MG tablet Take 1 tablet (500 mg) by mouth 4 times daily, Disp-35 tablet, R-0, Local Print      multivitamin w/minerals (THERA-VIT-M) tablet Take 1 tablet by mouth daily, No Print Out      polyethylene glycol (MIRALAX) 17 GM/Dose powder Take 17 g by mouth daily, Disp-510 g, R-0, Local Print           CONTINUE these medications which have CHANGED    Details   amLODIPine (NORVASC) 5 MG tablet Take 1 tablet (5 mg) by mouth daily, No Print Out      furosemide (LASIX) 20 MG tablet Take 1 tablet (20 mg) by mouth daily, No Print Out      oxyCODONE IR (ROXICODONE) 10 MG tablet Take 1-1.5 tablets (10-15 mg) by mouth every 4 hours as needed for moderate pain Taper per recommendations discussed with pain team and in your discharge instructions., Disp-31 tablet, R-0, Local Print           CONTINUE these medications which have NOT CHANGED    Details   acetaminophen (TYLENOL) 325 MG tablet Take 650 mg by mouth as needed, Historical      allopurinol (ZYLOPRIM) 300 MG tablet Take 300 mg by mouth every morning, Historical      amiodarone (PACERONE) 200 MG tablet Take 200 mg by mouth every morning, Historical      amitriptyline (ELAVIL) 10 MG tablet Take 10 mg by mouth at bedtime, Historical      amoxicillin (AMOXIL) 500 MG capsule Take 2,000 mg by mouth as needed Dental work, Historical      aspirin (ASA) 81 MG chewable tablet Take 81 mg by mouth every morning, Historical      atorvastatin (LIPITOR) 80 MG tablet Take 80 mg by mouth at bedtime, Historical      B Complex Vitamins (VITAMIN B COMPLEX PO) Take 1 tablet by mouth every morning, Historical      Calcium Carb-Cholecalciferol 600-800 MG-UNIT TABS Take 1 tablet by mouth daily (with lunch), Historical      clopidogrel (PLAVIX) 75 MG tablet Take 75 mg by mouth every  "morning, Historical      levothyroxine (SYNTHROID/LEVOTHROID) 175 MCG tablet Take 175 mcg by mouth every morning, Historical      nitroglycerin (NITROSTAT) 0.4 MG SL tablet Place 1 tablet under the tongue every 5 minutes as needed for chest pain (CALL 911 IF NOT IMPROVED AFTER THREE CONSECUTIVE DOSES)., 0.4 mg, Sublingual, EVERY 5 MIN PRN Starting 1/22/2013, Until Discontinued, Disp-25 tablet, R-0, Normal      sildenafil (VIAGRA) 100 MG tablet Take 100 mg by mouth daily as needed (sexual activity), Historical      vitamin B-12 (CYANOCOBALAMIN) 1000 MCG tablet Take 1,000 mcg by mouth every morning, Historical      Pregabalin (LYRICA) 200 MG capsule Take 2 capsules (400mg) in the morning and 1 capsule (200mg) in the evening., Historical           STOP taking these medications       oxyCODONE-acetaminophen (PERCOCET)  MG per tablet Comments:   Reason for Stopping:                 Discharge Procedure Orders   Reason for your hospital stay   Order Comments: Thoracic to pelvis fusion     When to call - Contact Surgeon Team   Order Comments: You may experience symptoms that require follow-up before your scheduled appointment. Refer to the \"Stoplight Tool\" for instructions on when to contact your Surgeon Team if you are concerned about pain control, blood clots, constipation, or if you are unable to urinate.     When to call - Reach out to Urgent Care   Order Comments: If you are not able to reach your Surgeon Team and you need immediate care, go to the Orthopedic Walk-in Clinic or Urgent Care at your Surgeon's office.  Do NOT go to the Emergency Room unless you have shortness of breath, chest pain, or other signs of a medical emergency.     When to call - Reasons to Call 911   Order Comments: Call 911 immediately if you experience sudden-onset chest pain, arm weakness/numbness, slurred speech, or shortness of breath     Discharge Instruction - Breathing exercises   Order Comments: Perform breathing exercises using " your Incentive Spirometer 10 times per hour while awake for 2 weeks.     Symptoms - Fever Management   Order Comments: A low grade fever can be expected after surgery.  Use acetaminophen (TYLENOL) as needed for fever management.  Contact your Surgeon Team if you have a fever greater than 101.5 F, chills, and/or night sweats.     Symptoms - Constipation management   Order Comments: Constipation (hard, dry bowel movements) is expected after surgery due to the combination of being less active, the anesthetic, and the opioid pain medication.  You can do the following to help reduce constipation:  ~  FLUIDS:  Drink clear liquids (water or Gatorade), or juice (apple/prune).  ~  DIET:  Eat a fiber rich diet.    ~  ACTIVITY:  Get up and move around several times a day.  Increase your activity as you are able.  MEDICATIONS:  Reduce the risk of constipation by starting medications before you are constipated.  You can take Miralax   (1 packet as directed) and/or a stool softener (Senokot 1-2 tablets 1-2 times a day).  If you already have constipation and these medications are not working, you can get magnesium citrate and use as directed.  If you continue to have constipation you can try an over the counter suppository or enema.  Call your Surgeon Team if it has been greater than 3 days since your last bowel movement.     Symptoms - Reduced Urine Output   Order Comments: Changes in the amount of fluids you drank before and after surgery may result in problems urinating.  It is important to stay well-hydrated after surgery and drink plenty of water. If it has been greater than 8 hours since you have urinated despite drinking plenty of water, call your Surgeon Team.     Activity - Exercises to prevent blood clots   Order Comments: Unless otherwise directed by your Surgeon team, perform the following exercises at least three times per day for the first four weeks after surgery to prevent blood clots in your legs: 1) Point and  flex your feet (Ankle Pumps), 2) Move your ankle around in big circles, 3) Wiggle your toes, 4) Walk, even for short distances, several times a day, will help decrease the risk of blood clots.     Order Specific Question Answer Comments   Is discharge order? Yes      Comfort and Pain Management - Pain after Surgery   Order Comments: Pain after surgery is normal and expected.  You will have some amount of pain for several weeks after surgery.  Your pain will improve with time.  There are several things you can do to help reduce your pain including: rest, ice, elevation, and using pain medications as needed. Contact your Surgeon Team if you have pain that persists or worsens after surgery despite rest, ice, elevation, and taking your medication(s) as prescribed. Contact your Surgeon Team if you have new numbness, tingling, or weakness in your operative extremity.     Comfort and Pain Management - Swelling after Surgery   Order Comments: Swelling and/or bruising of the surgical extremity is common and may persist for several months after surgery. In addition to frequent icing and elevation, gentle compressive support with an ACE wrap or tubigrip may help with swelling. Apply compression regularly, removing at least twice daily to perform skin checks. Contact your Surgeon Team if your swelling increases and is NOT associated with an increase in your activity level, or if your swelling increases and is associated with redness and pain.     Comfort and Pain Management - Cold therapy   Order Comments: Ice can be used to control swelling and discomfort after surgery. Place a thin towel over your operative site and apply the ice pack overtop. Leave ice pack in place for 20 minutes, then remove for 20 minutes. Repeat this 20 minutes on/20 minutes off routine as often as tolerated.     Medication Instructions - Acetaminophen (TYLENOL) Instructions   Order Comments: You were discharged with acetaminophen (TYLENOL) for pain  management after surgery. Acetaminophen most effectively manages pain symptoms when it is taken on a schedule without missing doses (every four, six, or eight hours). Your Provider will prescribe a safe daily dose between 3000 - 4000 mg.  Do NOT exceed this daily dose. Most patients use acetaminophen for pain control for the first four weeks after surgery.  You can wean from this medication as your pain decreases.     Medication Instructions - NSAID Instructions   Order Comments: Do not use NSAIDs x 12 weeks. Some common anti-inflammatories include: ibuprofen (ADVIL, MOTRIN), naproxen (ALEVE, NAPROSYN), celecoxib (CELEBREX), meloxicam (MOBIC), ketorolac (TORADOL).     Medication Instructions - Muscle relaxant Medication Instructions   Order Comments: You were discharged with a muscle relaxer medication that can be used in conjunction with acetaminophen (TYLENOL) and the opioid medication to manage pain symptoms. Take the muscle relaxant medication exactly as directed.     Follow Up Care   Order Comments: Follow-up with your Surgeon Team in 6 weeks for wound check.     Medication instructions - No pharmacologic VTE prophylaxis prescribed   Order Comments: Your Surgeon did not prescribe medication for anticoagulation.     Resume anticoagulation as prior to admission   Order Comments: Resume plavix and asprin     Medication Instructions - Opioid Instructions (1 - 2 tablets Q 4-6 hours, MAX 6 tablets)   Order Comments: You were discharged with an opioid medication (hydromorphone, oxycodone, hydrocodone, or tramadol). This medication should only be taken for breakthrough pain that is not controlled with acetaminophen (TYLENOL). If you rate your pain less than 3 you do not need this medication.  Pain rating 0-3:  You do not need this medication.  Pain rating 4-6:  Take 1 tablet every 4-6 hours as needed  Pain rating 7-10:  Take 2 tablets every 4-6 hours as needed.  Do not exceed 12 tablets per day.     Do not use alcohol  "while taking your opioid medication.     General info for SNF   Order Comments: Length of Stay Estimate: Short Term Care: Estimated # of Days <30 Condition at Discharge: Improving Level of care:skilled  Rehabilitation Potential: Excellent Admission H&P remains valid and up-to-date: Yes Recent Chemotherapy: N/A Use Nursing Home Standing Orders: Yes     Mantoux Instructions   Order Comments: Give two-step Mantoux (PPD) Per Facility Policy {.:[id number]     Incentive Spirometry   Order Comments: Incentive Spirometry 10 times per hour, 4 times per day.     Shower with wound/dressing covered   Order Comments: You must COVER your dressing or incision with saran wrap (or any other non-permeable covering) to allow the incision to remain dry while showering. You may shower 2 days after surgery as long as the surgical dressing stays dry. Continue to cover your dressing or incision for showering until your first office visit.  You are strictly prohibited from soaking or submerging the surgical wound underwater.     Brief Discharge Instructions   Order Comments: Post-operative Discharge Instructions:     WOUND CARE:  You have a dressing on your incision which can be worn for up to 14 days after surgery, and it can be worn in the shower. After the dressing has been removed, you do not need a dressing. There is \"surgical glue\" directly over the incision. This will fall off on its own, which can take up to 2 weeks. It is okay to shower and wash gently with soap and water. Do not soak in the bath. No pools, hot tubs, or lakes for 6 weeks.      After surgery, you may have a sensitive scar.  When the incision has fully healed, you may massage the scar to decrease sensitivity and help break down scar tissue. Do this up to 4 times per day.       DIET & EXERCISE:  - When you get home, you may resume your normal diet as tolerated. You may not be very hungry but try to eat small healthy meals to help you heal. Remember to drink plenty of " water/fluids to help keep you hydrated.     - You will be seen in the clinic at 6 weeks following surgery. You will not need to attend physical therapy during this time. You can focus on cardiovascular fitness by walking as much as you can tolerate. Avoid bending, lifting, and twisting. Your weight lifting restriction is 10 pounds until your first follow-up appointment.       PAIN MEDICATIONS:  For postoperative pain control, we have prescribed a variety of medications. Tylenol has been prescribed and should be taken as part of the complete pain management regimen. You may also have been prescribed a muscle relaxer to be taken as needed for muscle spasms. Other pain management techniques include icing the surgical area (for 15 minutes on, 15 minutes off) throughout the day to help with inflammation. Avoid NSAIDs (ibuprofen, Aleve, Advil, etc) for the first 12 weeks after surgery, unless approved by your surgeon.     You also received a prescription for a opioid medication.  This should be taken for the first few weeks following surgery.  As pain improves, decrease the amount you take (see tapering plan below). Opioid have numerous side effects including nausea, constipation, and drowsiness. If you experience nausea, this may be relieved by taking the medication with food or a light meal. To avoid constipation, please use an over-the-counter stool softeners and drink lots of water and eat fruits and vegetables. No operating heavy machinery or driving an automobile while on opioid medications.        Tapering opioids: As your pain symptoms improve, focus your efforts on decreasing (tapering) use of opioid medications. The most successful tapering strategy is to first, decrease the number of tablets you take every 4-6 hours to the minimum prescribed. Then, increase the amount of time between doses.    Goal: reduce to pre-surgery dose of Percocet.  For example:  Take 1-1.5 tabs every 4 hours as needed for 1-2  days.  Then reduce 1 dose, reducing to 1-1.5 tabs every 6 hours x 1-2 days.  Then reduce 1 dose, reducing to 1-1.5 tabs every 8 hours x 1-2 days.   Finally, reduce to 1 tab every 8 hours as needed, your baseline amount and resume your pre-surgery dose of Percocet as needed.     The bedtime dose can help with comfort during sleep and is typically the last dose to be discontinued after surgery.     Call the orthopedic clinic at [phone] several business days in advance of when you need a refill. Early refills will not be provided, and you may not take more than what is prescribed. Inform the nurse how much of the medication you are taking and how many tablets you have left.     WHEN TO CALL:  Please call or return if you experience the following:  - Fevers (temperature greater than 100.4 degrees Fahrenheit).  - Pain that is getting worse or does not respond to pain medications.  - Drainage from your wound.  - Increasing redness about the wound.  - Changes in strength or sensation to your arms/legs.   - Any other worrisome symptoms.     You may reach the clinic by dialing [phone].  After hours, you may reach the resident on call by dialing [phone].      Medication Instructions - Opioids - Tapering Instructions   Order Comments: In the first three days following surgery, your symptoms may warrant use of the narcotic pain medication every four to six hours as prescribed. This is normal. As your pain symptoms improve, focus your efforts on decreasing (tapering) use of narcotic medications. The most successful tapering strategy is to first, decrease the number of tablets you take every 4-6 hours to the minimum prescribed. Then, increase the amount of time between doses.  For example:   Take 1-1.5 tabs every 4 hours as needed for 1-2 days.   Then reduce 1 dose, reducing to 1-1.5 tabs every 6 hours x 1-2 days.   Then reduce 1 dose, reducing to 1-1.5 tabs every 8 hours x 1-2 days.   Finally, reduce to 1 tab  every 8 hours as needed, your baseline amount. Resume your pre-surgery dose of Percocet as needed.  The bedtime dose can help with comfort during sleep and is typically the last dose to be discontinued after surgery.     Physical Therapy Instructions   Order Comments: Begin physical therapy within one week following surgery at [facility]     Discharge Instruction - Regular Diet Adult   Order Comments: Return to your pre-surgery diet unless instructed otherwise     Order Specific Question Answer Comments   Is discharge order? Yes      Diet   Order Comments: Follow this diet upon discharge: Orders Placed This Encounter      Snacks/Supplements Adult: Zachery; With Meals      Advance Diet as Tolerated: Regular Diet Adult      Discharge Instruction - Regular Diet Adult     Order Specific Question Answer Comments   Is discharge order? Yes        Diego Fofana MD  Orthopedic Surgery Resident, PGY-4

## 2024-01-09 ENCOUNTER — TELEPHONE (OUTPATIENT)
Dept: ORTHOPEDICS | Facility: CLINIC | Age: 74
End: 2024-01-09
Payer: MEDICARE

## 2024-01-09 NOTE — TELEPHONE ENCOUNTER
[facility] Call Center    Phone Message    May a detailed message be left on voicemail: yes     Reason for Call: Other: Patient is 2 weeks POST OP (today) with discharge instructions to remove dressing at the 2-week brynn.     Fariba (nurse) is just making sure it is OK to remove as dressing is starting to peel.    Please reach out to discuss.    Action Taken: Message routed to:  [facility] ([facility] ortho    Travel Screening: Not Applicable

## 2024-01-10 ENCOUNTER — MEDICAL CORRESPONDENCE (OUTPATIENT)
Dept: HEALTH INFORMATION MANAGEMENT | Facility: CLINIC | Age: 74
End: 2024-01-10
Payer: MEDICARE

## 2024-01-11 ENCOUNTER — TELEPHONE (OUTPATIENT)
Dept: ORTHOPEDICS | Facility: CLINIC | Age: 74
End: 2024-01-11
Payer: MEDICARE

## 2024-01-11 ENCOUNTER — DOCUMENTATION ONLY (OUTPATIENT)
Dept: ORTHOPEDICS | Facility: CLINIC | Age: 74
End: 2024-01-11
Payer: MEDICARE

## 2024-01-11 NOTE — PROGRESS NOTES
Received Completed forms Yes   Faxed Forms Mailed to: [place] [facility]  Address: [address] [place] [address]   Sent to HIM (Date) 1/11/24

## 2024-01-11 NOTE — TELEPHONE ENCOUNTER
[facility] Call Center    Phone Message    May a detailed message be left on voicemail: yes     Reason for Call: Other: Patient is to be discharged on Tue, 1/16/24.     Catalina () is requesting a call back to inquire when patient should begin PT?    Action Taken: Message routed to:  [facility] ([facility] ortho    Travel Screening: Not Applicable

## 2024-01-12 NOTE — TELEPHONE ENCOUNTER
RN called Catalina from [facility] to discuss patients restrictions. Patient would benefit from home PT to work on lower extremity strengthening and gait training. Provided patient's restrictions of no excessive bending, lifting and twisting. Patient to discharge next week and is doing good. No further concerns or questions at this point.     Roxy Mejia RN

## 2024-01-19 ENCOUNTER — TELEPHONE (OUTPATIENT)
Dept: ORTHOPEDICS | Facility: CLINIC | Age: 74
End: 2024-01-19
Payer: MEDICARE

## 2024-01-19 NOTE — TELEPHONE ENCOUNTER
Writer called and spoke with patient on the phone. Writer explained that every hour patient can either ice or heat for 20 minutes. Then take the rest of the hour off. At start of next hour can choose to heat or ice then take rest of hour off. Pt agreed with plan.     Magdalena Siu LPN

## 2024-01-19 NOTE — TELEPHONE ENCOUNTER
[facility] Call Center    Phone Message    May a detailed message be left on voicemail: yes     Reason for Call: Other: Patient is calling in to speak with Roxy. Patient is calling in because he would like to discuss icing and heating his back and how and when he should be doing that. Please call Patient.      Action Taken: Other: [phone]    Travel Screening: Not Applicable

## 2024-01-23 DIAGNOSIS — Z98.1 S/P LUMBAR FUSION: Primary | ICD-10-CM

## 2024-01-23 LAB
BACTERIA TISS BX CULT: NO GROWTH

## 2024-01-24 ENCOUNTER — MEDICAL CORRESPONDENCE (OUTPATIENT)
Dept: HEALTH INFORMATION MANAGEMENT | Facility: CLINIC | Age: 74
End: 2024-01-24

## 2024-01-29 DIAGNOSIS — Z98.1 S/P LUMBAR FUSION: ICD-10-CM

## 2024-01-29 RX ORDER — METHOCARBAMOL 500 MG/1
500 TABLET, FILM COATED ORAL 4 TIMES DAILY
Qty: 35 TABLET | Refills: 1 | Status: SHIPPED | OUTPATIENT
Start: 2024-01-29 | End: 2024-03-13

## 2024-01-29 RX ORDER — OXYCODONE HYDROCHLORIDE 10 MG/1
10 TABLET ORAL EVERY 4 HOURS PRN
Qty: 36 TABLET | Refills: 0 | Status: SHIPPED | OUTPATIENT
Start: 2024-01-29 | End: 2024-02-07

## 2024-01-29 NOTE — TELEPHONE ENCOUNTER
PDMP Review         Value Time User    State PDMP site checked  Yes 1/29/2024 12:30 PM Carolyn Patel PA-C             PDMP reviewed today:  #50 oxycodone 10mg filled on 1/11/24    Today, sent refill as requested. Using appropriately. On narcotics pre-op    Carolyn Patel PA-C

## 2024-01-29 NOTE — TELEPHONE ENCOUNTER
"RN called patient's wife back to discuss patient's post operative recovery. Patient is taking 15 mg Oxycodone twice daily, he supplements Robaxin and Tylenol. She does not feel like patient is improving much. They are still recovering from covid, patient only does his \"exercises\" every other day, he is constipated and isn't eating much.     RN sympathizing with patient and wife. They can have patient take 10 mg of oxycodone every 4 hours as needed, taking tylenol and robaxin more frequently to help. They also haven't set Home Care up yet and this would help patient immensely. Suggested to family to set this up.     They need refills, sending to team.       Roxy Mejia RN    "

## 2024-01-29 NOTE — TELEPHONE ENCOUNTER
[facility] Call Center    Phone Message    May a detailed message be left on voicemail: no     Reason for Call: Blessing requesting c/b- regarding pain meds & PT

## 2024-01-30 ENCOUNTER — TELEPHONE (OUTPATIENT)
Dept: ORTHOPEDICS | Facility: CLINIC | Age: 74
End: 2024-01-30
Payer: MEDICARE

## 2024-01-30 NOTE — TELEPHONE ENCOUNTER
Milly at [facility] was called back and she was given the verbal orders listed below.  Message routed to team to discuss the uncontrolled pain.

## 2024-01-30 NOTE — TELEPHONE ENCOUNTER
[facility] Call Center    Phone Message    May a detailed message be left on voicemail: yes     Reason for Call: Order(s): Home Care Orders: Physical Therapy (PT): 1x a week for 5 weeks starting 01/29/2024    FYI patient is complaining about uncontrolled pain.     Action Taken: Message routed to:  [facility] ([facility] Orthopedics    Travel Screening: Not Applicable

## 2024-01-30 NOTE — TELEPHONE ENCOUNTER
Called patient today in response to concerns from home health nurse.  Please see note from Roxy Mejia RN who called and spoke with patient about pain management yesterday and gave excellent recommendations.    Today, Emile continues to endorse low back pain which she says is about the same since postop.  Not worsening or improving. No pain into legs. He continues to endorse that he is only taking oxycodone 15 mg in the morning, 15 mg around lunchtime and then 5 mg at night.  His wife says that he is taking 3-4 Tylenol per day and Robaxin scheduled throughout the day.  He has physical therapy coming to his home.  He is not yet 6 weeks postop.  He was on narcotics preop, 3 Tablets of oxycodone/acetaminophen 10/325 mg/day.      Recommend patient continue to take Tylenol regularly throughout the day; maximum 3000 mg/day of Tylenol.  He should continue to take Robaxin as needed throughout the day for muscle spasms.  Suggested that he try taking a lower dose of oxycodone (10 mg) on a more regular dosing schedule throughout the day to help with pain.  He is currently reporting that he is only taking a total of 35 mg oxycodone per day, about 6 to 8 hours apart each dose.  He request that he take 15 mg every 4 hours to control the pain.  Suggested he start with 10 mg every 4 hours since he is currently having uncontrolled pain with taking 15 mg spaced out every 6-8 hours.  Patient had difficulty understanding this concept despite several attempts at explaining this dosing schedule.  However, wife is agreeable with the plan.  I also suggested trial of Salonpas patches, but they tried this and did not think was helpful.  He is on lyrica 200mg TID.    He has an upcoming visit with Dr. Zavaleta next week where he can get updated images to ensure things are healing properly and further discuss pain management. Reassured patient that continued pain at this point in recovery after major spine surgery is to be expected,  especially in patients on chronic narcotics. Expect pain to continue to improve over the next several weeks.  Patient verbalized understanding is agreeable with plan.    Carolyn Patel PA-C

## 2024-01-31 ENCOUNTER — MEDICAL CORRESPONDENCE (OUTPATIENT)
Dept: ORTHOPEDICS | Facility: CLINIC | Age: 74
End: 2024-01-31
Payer: MEDICARE

## 2024-02-06 LAB
MDC_IDC_LEAD_CONNECTION_STATUS: NORMAL
MDC_IDC_LEAD_IMPLANT_DT: NORMAL
MDC_IDC_LEAD_LOCATION: NORMAL
MDC_IDC_LEAD_LOCATION_DETAIL_1: NORMAL
MDC_IDC_LEAD_MFG: NORMAL
MDC_IDC_LEAD_MODEL: NORMAL
MDC_IDC_LEAD_POLARITY_TYPE: NORMAL
MDC_IDC_LEAD_SERIAL: NORMAL
MDC_IDC_LEAD_SPECIAL_FUNCTION: NORMAL
MDC_IDC_MSMT_BATTERY_DTM: NORMAL
MDC_IDC_MSMT_BATTERY_DTM: NORMAL
MDC_IDC_MSMT_BATTERY_REMAINING_LONGEVITY: 121 MO
MDC_IDC_MSMT_BATTERY_REMAINING_LONGEVITY: 124 MO
MDC_IDC_MSMT_BATTERY_RRT_TRIGGER: 2.62
MDC_IDC_MSMT_BATTERY_RRT_TRIGGER: 2.62
MDC_IDC_MSMT_BATTERY_STATUS: NORMAL
MDC_IDC_MSMT_BATTERY_STATUS: NORMAL
MDC_IDC_MSMT_BATTERY_VOLTAGE: 3.01 V
MDC_IDC_MSMT_BATTERY_VOLTAGE: 3.02 V
MDC_IDC_MSMT_LEADCHNL_RA_IMPEDANCE_VALUE: 285 OHM
MDC_IDC_MSMT_LEADCHNL_RA_IMPEDANCE_VALUE: 342 OHM
MDC_IDC_MSMT_LEADCHNL_RA_IMPEDANCE_VALUE: 418 OHM
MDC_IDC_MSMT_LEADCHNL_RA_IMPEDANCE_VALUE: 475 OHM
MDC_IDC_MSMT_LEADCHNL_RA_PACING_THRESHOLD_AMPLITUDE: 0.75 V
MDC_IDC_MSMT_LEADCHNL_RA_PACING_THRESHOLD_AMPLITUDE: 1.25 V
MDC_IDC_MSMT_LEADCHNL_RA_PACING_THRESHOLD_PULSEWIDTH: 0.4 MS
MDC_IDC_MSMT_LEADCHNL_RA_PACING_THRESHOLD_PULSEWIDTH: 0.4 MS
MDC_IDC_MSMT_LEADCHNL_RA_SENSING_INTR_AMPL: 1.9 MV
MDC_IDC_MSMT_LEADCHNL_RA_SENSING_INTR_AMPL: 2.38 MV
MDC_IDC_MSMT_LEADCHNL_RA_SENSING_INTR_AMPL: 2.38 MV
MDC_IDC_MSMT_LEADCHNL_RA_SENSING_INTR_AMPL: 3.25 MV
MDC_IDC_MSMT_LEADCHNL_RV_IMPEDANCE_VALUE: 399 OHM
MDC_IDC_MSMT_LEADCHNL_RV_IMPEDANCE_VALUE: 456 OHM
MDC_IDC_MSMT_LEADCHNL_RV_IMPEDANCE_VALUE: 475 OHM
MDC_IDC_MSMT_LEADCHNL_RV_IMPEDANCE_VALUE: 513 OHM
MDC_IDC_MSMT_LEADCHNL_RV_PACING_THRESHOLD_AMPLITUDE: 0.75 V
MDC_IDC_MSMT_LEADCHNL_RV_PACING_THRESHOLD_AMPLITUDE: 0.75 V
MDC_IDC_MSMT_LEADCHNL_RV_PACING_THRESHOLD_PULSEWIDTH: 0.4 MS
MDC_IDC_MSMT_LEADCHNL_RV_PACING_THRESHOLD_PULSEWIDTH: 0.4 MS
MDC_IDC_MSMT_LEADCHNL_RV_SENSING_INTR_AMPL: 13 MV
MDC_IDC_MSMT_LEADCHNL_RV_SENSING_INTR_AMPL: 13.75 MV
MDC_IDC_MSMT_LEADCHNL_RV_SENSING_INTR_AMPL: 13.75 MV
MDC_IDC_MSMT_LEADCHNL_RV_SENSING_INTR_AMPL: 18.5 MV
MDC_IDC_PG_IMPLANT_DTM: NORMAL
MDC_IDC_PG_IMPLANT_DTM: NORMAL
MDC_IDC_PG_MFG: NORMAL
MDC_IDC_PG_MFG: NORMAL
MDC_IDC_PG_MODEL: NORMAL
MDC_IDC_PG_MODEL: NORMAL
MDC_IDC_PG_SERIAL: NORMAL
MDC_IDC_PG_SERIAL: NORMAL
MDC_IDC_PG_TYPE: NORMAL
MDC_IDC_PG_TYPE: NORMAL
MDC_IDC_SESS_CLINIC_NAME: NORMAL
MDC_IDC_SESS_CLINIC_NAME: NORMAL
MDC_IDC_SESS_DTM: NORMAL
MDC_IDC_SESS_DTM: NORMAL
MDC_IDC_SESS_TYPE: NORMAL
MDC_IDC_SESS_TYPE: NORMAL
MDC_IDC_SET_BRADY_AT_MODE_SWITCH_RATE: 171 {BEATS}/MIN
MDC_IDC_SET_BRADY_HYSTRATE: NORMAL
MDC_IDC_SET_BRADY_HYSTRATE: NORMAL
MDC_IDC_SET_BRADY_LOWRATE: 60 {BEATS}/MIN
MDC_IDC_SET_BRADY_LOWRATE: 85 {BEATS}/MIN
MDC_IDC_SET_BRADY_MAX_SENSOR_RATE: 130 {BEATS}/MIN
MDC_IDC_SET_BRADY_MAX_TRACKING_RATE: 130 {BEATS}/MIN
MDC_IDC_SET_BRADY_MODE: NORMAL
MDC_IDC_SET_BRADY_MODE: NORMAL
MDC_IDC_SET_BRADY_PAV_DELAY_LOW: 180 MS
MDC_IDC_SET_BRADY_PAV_DELAY_LOW: 180 MS
MDC_IDC_SET_BRADY_SAV_DELAY_LOW: 150 MS
MDC_IDC_SET_LEADCHNL_RA_PACING_AMPLITUDE: 2 V
MDC_IDC_SET_LEADCHNL_RA_PACING_AMPLITUDE: 2 V
MDC_IDC_SET_LEADCHNL_RA_PACING_ANODE_ELECTRODE_1: NORMAL
MDC_IDC_SET_LEADCHNL_RA_PACING_ANODE_ELECTRODE_1: NORMAL
MDC_IDC_SET_LEADCHNL_RA_PACING_ANODE_LOCATION_1: NORMAL
MDC_IDC_SET_LEADCHNL_RA_PACING_ANODE_LOCATION_1: NORMAL
MDC_IDC_SET_LEADCHNL_RA_PACING_CAPTURE_MODE: NORMAL
MDC_IDC_SET_LEADCHNL_RA_PACING_CATHODE_ELECTRODE_1: NORMAL
MDC_IDC_SET_LEADCHNL_RA_PACING_CATHODE_ELECTRODE_1: NORMAL
MDC_IDC_SET_LEADCHNL_RA_PACING_CATHODE_LOCATION_1: NORMAL
MDC_IDC_SET_LEADCHNL_RA_PACING_CATHODE_LOCATION_1: NORMAL
MDC_IDC_SET_LEADCHNL_RA_PACING_POLARITY: NORMAL
MDC_IDC_SET_LEADCHNL_RA_PACING_POLARITY: NORMAL
MDC_IDC_SET_LEADCHNL_RA_PACING_PULSEWIDTH: 0.4 MS
MDC_IDC_SET_LEADCHNL_RA_PACING_PULSEWIDTH: 0.4 MS
MDC_IDC_SET_LEADCHNL_RA_SENSING_ANODE_ELECTRODE_1: NORMAL
MDC_IDC_SET_LEADCHNL_RA_SENSING_ANODE_LOCATION_1: NORMAL
MDC_IDC_SET_LEADCHNL_RA_SENSING_CATHODE_ELECTRODE_1: NORMAL
MDC_IDC_SET_LEADCHNL_RA_SENSING_CATHODE_LOCATION_1: NORMAL
MDC_IDC_SET_LEADCHNL_RA_SENSING_POLARITY: NORMAL
MDC_IDC_SET_LEADCHNL_RA_SENSING_SENSITIVITY: 0.3 MV
MDC_IDC_SET_LEADCHNL_RV_PACING_AMPLITUDE: 2 V
MDC_IDC_SET_LEADCHNL_RV_PACING_AMPLITUDE: 2 V
MDC_IDC_SET_LEADCHNL_RV_PACING_ANODE_ELECTRODE_1: NORMAL
MDC_IDC_SET_LEADCHNL_RV_PACING_ANODE_ELECTRODE_1: NORMAL
MDC_IDC_SET_LEADCHNL_RV_PACING_ANODE_LOCATION_1: NORMAL
MDC_IDC_SET_LEADCHNL_RV_PACING_ANODE_LOCATION_1: NORMAL
MDC_IDC_SET_LEADCHNL_RV_PACING_CAPTURE_MODE: NORMAL
MDC_IDC_SET_LEADCHNL_RV_PACING_CATHODE_ELECTRODE_1: NORMAL
MDC_IDC_SET_LEADCHNL_RV_PACING_CATHODE_ELECTRODE_1: NORMAL
MDC_IDC_SET_LEADCHNL_RV_PACING_CATHODE_LOCATION_1: NORMAL
MDC_IDC_SET_LEADCHNL_RV_PACING_CATHODE_LOCATION_1: NORMAL
MDC_IDC_SET_LEADCHNL_RV_PACING_POLARITY: NORMAL
MDC_IDC_SET_LEADCHNL_RV_PACING_POLARITY: NORMAL
MDC_IDC_SET_LEADCHNL_RV_PACING_PULSEWIDTH: 0.4 MS
MDC_IDC_SET_LEADCHNL_RV_PACING_PULSEWIDTH: 0.4 MS
MDC_IDC_SET_LEADCHNL_RV_SENSING_ANODE_ELECTRODE_1: NORMAL
MDC_IDC_SET_LEADCHNL_RV_SENSING_ANODE_LOCATION_1: NORMAL
MDC_IDC_SET_LEADCHNL_RV_SENSING_CATHODE_ELECTRODE_1: NORMAL
MDC_IDC_SET_LEADCHNL_RV_SENSING_CATHODE_LOCATION_1: NORMAL
MDC_IDC_SET_LEADCHNL_RV_SENSING_POLARITY: NORMAL
MDC_IDC_SET_LEADCHNL_RV_SENSING_SENSITIVITY: 0.9 MV
MDC_IDC_SET_ZONE_DETECTION_INTERVAL: 200 MS
MDC_IDC_SET_ZONE_DETECTION_INTERVAL: 350 MS
MDC_IDC_SET_ZONE_DETECTION_INTERVAL: 350 MS
MDC_IDC_SET_ZONE_DETECTION_INTERVAL: 400 MS
MDC_IDC_SET_ZONE_DETECTION_INTERVAL: 400 MS
MDC_IDC_SET_ZONE_STATUS: NORMAL
MDC_IDC_SET_ZONE_TYPE: NORMAL
MDC_IDC_SET_ZONE_VENDOR_TYPE: NORMAL
MDC_IDC_STAT_AT_BURDEN_PERCENT: 0 %
MDC_IDC_STAT_AT_BURDEN_PERCENT: 0 %
MDC_IDC_STAT_AT_DTM_END: NORMAL
MDC_IDC_STAT_AT_DTM_END: NORMAL
MDC_IDC_STAT_AT_DTM_START: NORMAL
MDC_IDC_STAT_AT_DTM_START: NORMAL
MDC_IDC_STAT_BRADY_AP_VP_PERCENT: 0.67 %
MDC_IDC_STAT_BRADY_AP_VP_PERCENT: 0.91 %
MDC_IDC_STAT_BRADY_AP_VS_PERCENT: 96.77 %
MDC_IDC_STAT_BRADY_AP_VS_PERCENT: 97 %
MDC_IDC_STAT_BRADY_AS_VP_PERCENT: 0.01 %
MDC_IDC_STAT_BRADY_AS_VP_PERCENT: 0.01 %
MDC_IDC_STAT_BRADY_AS_VS_PERCENT: 2.31 %
MDC_IDC_STAT_BRADY_AS_VS_PERCENT: 2.32 %
MDC_IDC_STAT_BRADY_DTM_END: NORMAL
MDC_IDC_STAT_BRADY_DTM_END: NORMAL
MDC_IDC_STAT_BRADY_DTM_START: NORMAL
MDC_IDC_STAT_BRADY_DTM_START: NORMAL
MDC_IDC_STAT_BRADY_RA_PERCENT_PACED: 97.62 %
MDC_IDC_STAT_BRADY_RA_PERCENT_PACED: 97.63 %
MDC_IDC_STAT_BRADY_RV_PERCENT_PACED: 0.69 %
MDC_IDC_STAT_BRADY_RV_PERCENT_PACED: 0.92 %
MDC_IDC_STAT_EPISODE_RECENT_COUNT: 0
MDC_IDC_STAT_EPISODE_RECENT_COUNT_DTM_END: NORMAL
MDC_IDC_STAT_EPISODE_RECENT_COUNT_DTM_START: NORMAL
MDC_IDC_STAT_EPISODE_TOTAL_COUNT: 0
MDC_IDC_STAT_EPISODE_TOTAL_COUNT: 1
MDC_IDC_STAT_EPISODE_TOTAL_COUNT: 1
MDC_IDC_STAT_EPISODE_TOTAL_COUNT_DTM_END: NORMAL
MDC_IDC_STAT_EPISODE_TOTAL_COUNT_DTM_START: NORMAL
MDC_IDC_STAT_EPISODE_TYPE: NORMAL
MDC_IDC_STAT_TACHYTHERAPY_RECENT_DTM_END: NORMAL
MDC_IDC_STAT_TACHYTHERAPY_RECENT_DTM_END: NORMAL
MDC_IDC_STAT_TACHYTHERAPY_RECENT_DTM_START: NORMAL
MDC_IDC_STAT_TACHYTHERAPY_RECENT_DTM_START: NORMAL
MDC_IDC_STAT_TACHYTHERAPY_TOTAL_DTM_END: NORMAL
MDC_IDC_STAT_TACHYTHERAPY_TOTAL_DTM_END: NORMAL
MDC_IDC_STAT_TACHYTHERAPY_TOTAL_DTM_START: NORMAL
MDC_IDC_STAT_TACHYTHERAPY_TOTAL_DTM_START: NORMAL

## 2024-02-07 ENCOUNTER — OFFICE VISIT (OUTPATIENT)
Dept: ORTHOPEDICS | Facility: CLINIC | Age: 74
End: 2024-02-07
Payer: MEDICARE

## 2024-02-07 ENCOUNTER — ANCILLARY PROCEDURE (OUTPATIENT)
Dept: GENERAL RADIOLOGY | Facility: CLINIC | Age: 74
End: 2024-02-07
Attending: ORTHOPAEDIC SURGERY
Payer: MEDICARE

## 2024-02-07 DIAGNOSIS — M80.00XA AGE-RELATED OSTEOPOROSIS WITH CURRENT PATHOLOGICAL FRACTURE, INITIAL ENCOUNTER: Primary | ICD-10-CM

## 2024-02-07 DIAGNOSIS — Z98.1 HISTORY OF LUMBAR SPINAL FUSION: ICD-10-CM

## 2024-02-07 DIAGNOSIS — Z98.1 S/P LUMBAR FUSION: ICD-10-CM

## 2024-02-07 DIAGNOSIS — M51.34 ADJACENT SEGMENT DISEASE OF THORACIC SPINE WITH HISTORY OF FUSION PROCEDURE: ICD-10-CM

## 2024-02-07 DIAGNOSIS — M43.16 LUMBAR ADJACENT SEGMENT DISEASE WITH SPONDYLOLISTHESIS: ICD-10-CM

## 2024-02-07 DIAGNOSIS — M51.369 LUMBAR ADJACENT SEGMENT DISEASE WITH SPONDYLOLISTHESIS: ICD-10-CM

## 2024-02-07 DIAGNOSIS — Z98.1 ADJACENT SEGMENT DISEASE OF THORACIC SPINE WITH HISTORY OF FUSION PROCEDURE: ICD-10-CM

## 2024-02-07 LAB — RADIOLOGIST FLAGS: ABNORMAL

## 2024-02-07 PROCEDURE — 99024 POSTOP FOLLOW-UP VISIT: CPT | Performed by: ORTHOPAEDIC SURGERY

## 2024-02-07 PROCEDURE — 72080 X-RAY EXAM THORACOLMB 2/> VW: CPT | Performed by: STUDENT IN AN ORGANIZED HEALTH CARE EDUCATION/TRAINING PROGRAM

## 2024-02-07 RX ORDER — OXYCODONE HYDROCHLORIDE 10 MG/1
10-15 TABLET ORAL EVERY 4 HOURS PRN
Qty: 60 TABLET | Refills: 0 | Status: SHIPPED | OUTPATIENT
Start: 2024-02-07 | End: 2024-02-21

## 2024-02-07 NOTE — LETTER
2/7/2024         RE: Emile Wood  [id number]  [place] [address]        Dear Colleague,    Thank you for referring your patient, Emile Wood, to the [facility] [place]. Please see a copy of my visit note below.    Chief Concern:     Follow up s/p 12/26/2023 T12-pelvis fusion with james fernandez osteotomy at L4-5 and L5-S1    History present illness  Emile is a 73-year-old male with history of L2-S1 laminectomy more recent L2-3 transforaminal lumbar interbody fusion who presented to my clinic with lumbosacral flatback syndrome, persistent lumbar stenosis, lumbar radiculopathy and neurogenic claudication.  Ideally would have performed thoracolumbar fusion with pelvic fixation and multilevel Smith-Plummer osteotomy and transforaminal lumbar interbody fusion to address the flatback deformity however due to his substantial medical comorbidity decision was made to proceed with instrumented fusion and limited osteotomy work at L4-5 and L5-S1 to decompress and also mobilize those segments for deformity correction at the lumbosacral junction.     Preoperatively I had referred him for bone health optimization given we identified his osteoporosis preoperatively.  Unfortunately they were not able to schedule that appointment for 5 or 6 months from the time of the consultation.  Subsequently patient has not been in started on any anabolic bone agent.     In the immediate postoperative course he did quite well and subsequently discharged to transitional care unit.  Subsequently discharged to home but his recovery was impacted by both the patient and his wife contracted COVID-19.  More recently he feels like his pain is actually increased in the low back.  Is having to use walker to ambulate.  Does not have pain or numbness rating down the legs.    Today's incision looks well-healed.  He is clearly uncomfortable but not in acute extremis  Has positive sagittal balance  clinically and limited to short trips.  He frequently to stand up and lean back against wall.  He states that most of his pain is actually at the lower back from the lumbosacral junction as opposed to the thoracolumbar junction where his fusion construct ends.  Resisted strength testing did not show any focal deficits.  He has decreased sensation circumferentially distally due to length dependent axonal polyneuropathy.    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  Proximal junctional angle as measured from superior endplate of T11 to the inferior endplate of L1 is 40 degrees kyphosis.    Impression and plan:  73-year-old male multiple comorbidities 6 weeks status post T12 to pelvis fusion for flatback deformity, stenosis with neurogenic claudication and radiculopathy.  Having significant low back pain and has radiographic evidence of proximal junctional failure.  Show the images to the patient and his wife and discussed with them the significance of this finding.  One of the challenges in managing this even if we were to go forward with a revision surgery is that he has osteoporosis throughout the spine so there is always a risk of failure at the proximal junction.  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  We discussed the options of how to proceed.  Given there is no neurologic deficit at this point and his substantial medical comorbidities which places him at increased risk of major complication with any revision surgery I recommended trial of TLSO to prevent further collapse.  If we could get the T12 fracture to heal  in this position may actually give Emile a reasonable functional outcome without undertaking the risk of additional surgery.    I have also recommended immediately starting osteo anabolic therapy and the provided prescription for Evenity.  I suspect there will be an insurance denial as there always is and this will take several appeals and.  Appears however the patient clearly needs to be on an osteo anabolic agent.  Given the difficulty with getting patient scheduled in any reasonable amount of time with either endocrinology or our nonoperative orthopedist's who also manage bone health I placed the order for the medication.  I will reach out to Emile's primary care provider and ask if they are able to monitor labs for this medication.  Given Emile continues on relatively high-dose opioids we again talked about the importance of weaning those medications.  I certainly understand that given the proximal junctional failure there will be some pain associated with this we need to continue to work toward mobilization to strengthen the core muscles and wean from opioids.  I provided a refill for oxycodone as well as Narcan.  I would like to see Emile back for radiographs in another 6 weeks.  I asked him to wear the TLSO at all times unless he is laying down.  If he develops any neurologic compromise would like him to contact my team immediately.      Nacho Zavaleta MD  , Department of Orthopedic Surgery  [facility]

## 2024-02-07 NOTE — PROGRESS NOTES
Chief Concern:     Follow up s/p 12/26/2023 T12-pelvis fusion with james fernandez osteotomy at L4-5 and L5-S1    History present illness  Emile is a 73-year-old male with history of L2-S1 laminectomy more recent L2-3 transforaminal lumbar interbody fusion who presented to my clinic with lumbosacral flatback syndrome, persistent lumbar stenosis, lumbar radiculopathy and neurogenic claudication.  Ideally would have performed thoracolumbar fusion with pelvic fixation and multilevel Smith-Plummer osteotomy and transforaminal lumbar interbody fusion to address the flatback deformity however due to his substantial medical comorbidity decision was made to proceed with instrumented fusion and limited osteotomy work at L4-5 and L5-S1 to decompress and also mobilize those segments for deformity correction at the lumbosacral junction.     Preoperatively I had referred him for bone health optimization given we identified his osteoporosis preoperatively.  Unfortunately they were not able to schedule that appointment for 5 or 6 months from the time of the consultation.  Subsequently patient has not been in started on any anabolic bone agent.     In the immediate postoperative course he did quite well and subsequently discharged to transitional care unit.  Subsequently discharged to home but his recovery was impacted by both the patient and his wife contracted COVID-19.  More recently he feels like his pain is actually increased in the low back.  Is having to use walker to ambulate.  Does not have pain or numbness rating down the legs.    Today's incision looks well-healed.  He is clearly uncomfortable but not in acute extremis  Has positive sagittal balance clinically and limited to short trips.  He frequently to stand up and lean back against wall.  He states that most of his pain is actually at the lower back from the lumbosacral junction as opposed to the thoracolumbar junction where his fusion construct ends.  Resisted  strength testing did not show any focal deficits.  He has decreased sensation circumferentially distally due to length dependent axonal polyneuropathy.    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  Proximal junctional angle as measured from superior endplate of T11 to the inferior endplate of L1 is 40 degrees kyphosis.    Impression and plan:  73-year-old male multiple comorbidities 6 weeks status post T12 to pelvis fusion for flatback deformity, stenosis with neurogenic claudication and radiculopathy.  Having significant low back pain and has radiographic evidence of proximal junctional failure.  Show the images to the patient and his wife and discussed with them the significance of this finding.  One of the challenges in managing this even if we were to go forward with a revision surgery is that he has osteoporosis throughout the spine so there is always a risk of failure at the proximal junction.  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  We discussed the options of how to proceed.  Given there is no neurologic deficit at this point and his substantial medical comorbidities which places him at increased risk of major complication with any revision surgery I recommended trial of TLSO to prevent further collapse.  If we could get the T12 fracture to heal in this position may actually give Emile a reasonable functional outcome without undertaking the risk of additional surgery.    I have also recommended immediately starting osteo anabolic therapy and the provided prescription for Evenity.  I suspect there will be an  insurance denial as there always is and this will take several appeals and.  Appears however the patient clearly needs to be on an osteo anabolic agent.  Given the difficulty with getting patient scheduled in any reasonable amount of time with either endocrinology or our nonoperative orthopedist's who also manage bone health I placed the order for the medication.  I will reach out to Emile's primary care provider and ask if they are able to monitor labs for this medication.  Given Emile continues on relatively high-dose opioids we again talked about the importance of weaning those medications.  I certainly understand that given the proximal junctional failure there will be some pain associated with this we need to continue to work toward mobilization to strengthen the core muscles and wean from opioids.  I provided a refill for oxycodone as well as Narcan.  I would like to see Emile back for radiographs in another 6 weeks.  I asked him to wear the TLSO at all times unless he is laying down.  If he develops any neurologic compromise would like him to contact my team immediately.      Nacho Zavaleta MD  , Department of Orthopedic Surgery  [facility]

## 2024-02-07 NOTE — NURSING NOTE
Reason For Visit:   Chief Complaint   Patient presents with    RECHECK     DOS: 12/26/23 // stealth/O arm assisted reinstrumentation Lumbar 2-3, posterior spinal fusion Thoracic 11-Sacral 1, bilateral pelvic fixation ([place]), SPO and Transforaminal lumbar interbody fusion (TLIF) Lumbar 1-2, Lumbar 3-4, Lumbar 4-5, Lumbar 5-Sacral 1, local autograft, allograft bone and BMP       Primary MD: Luciano Hogan  Ref. MD: EST    ?  No  Occupation retired.     Date of injury: No  Type of injury: No     Date of surgery & Type:  1999 Lumbar herniated disc     2016 L2-3 BILATERAL LAMINECTOMY DISCECTOMY, L3-5 DECOMPRESSIVE LAMINECTOMY      10/27/2022 Redo left L2-3 decompression with L2-3 Transforaminal Lumbar Interbody Fusion L2-3 posterior lateral instrumentation and fusion     12/26/23 reinstrumentation Lumbar 2-3, posterior spinal fusion Thoracic 12-Sacral 1, bilateral pelvic fixation ([place]), Fowler-Plummer Osteotomy Lumbar 4-5, Sacral 1      Smoker: No  Request smoking cessation information: No    There were no vitals taken for this visit.    Pain Assessment  Patient Currently in Pain: Yes  0-10 Pain Scale: 5 (sitting, 7-8/10 walking)  Primary Pain Location: Back    Oswestry (RUPERTO) Questionnaire        2/7/2024    11:02 AM   OSWESTRY DISABILITY INDEX   Count 9   Sum 32   Oswestry Score (%) 71.11 %        Visual Analog Pain Scale  Back Pain Scale 0-10: 5 (sitting, 7-8/10 walking)  Right leg pain: 0  Left leg pain: 0  Neck Pain Scale 0-10: 0  Right arm pain: 0  Left arm pain: 0    Promis 10 Assessment         No data to display                         Magdalena Siu LPN

## 2024-02-13 ENCOUNTER — TELEPHONE (OUTPATIENT)
Dept: ORTHOPEDICS | Facility: CLINIC | Age: 74
End: 2024-02-13
Payer: MEDICARE

## 2024-02-13 NOTE — TELEPHONE ENCOUNTER
----- Message from Nacho Zavaleta MD sent at 2/13/2024 10:08 AM CST -----  oRxy,  Unable to get in touch with Emile's PCP as he does not show up in our address book.  Can you reach out to Dr. Hogan and ask if he is willing to monitor labs for Emile.  Have ordered Evenity not sure if insurance will approve it but he deftly needs to be on an osteoanabolic.

## 2024-02-13 NOTE — TELEPHONE ENCOUNTER
RN called to speak to patient's PCP or his RN about starting patient on Evenity. Will discuss the below when RN calls writer back.    Roxy Mejia RN

## 2024-02-16 NOTE — TELEPHONE ENCOUNTER
RN called patient's PCP clinic to discuss medication again. Talked about Dr. Hogan taking over patient's osteoporosis management and trying osteo anabolic medication like Evenity. Message will be sent to pateint's PCP    Roxy Mejia RN

## 2024-02-20 ENCOUNTER — MYC MEDICAL ADVICE (OUTPATIENT)
Dept: ORTHOPEDICS | Facility: CLINIC | Age: 74
End: 2024-02-20
Payer: MEDICARE

## 2024-02-20 DIAGNOSIS — Z98.1 S/P LUMBAR FUSION: ICD-10-CM

## 2024-02-21 RX ORDER — OXYCODONE HYDROCHLORIDE 10 MG/1
10-15 TABLET ORAL EVERY 6 HOURS PRN
Qty: 42 TABLET | Refills: 0 | Status: SHIPPED | OUTPATIENT
Start: 2024-02-21 | End: 2024-02-23

## 2024-02-21 NOTE — TELEPHONE ENCOUNTER
Pdmp reviewed. Signed oxycodone 10-15mg q6hrs. Max of 6 tabs per day.    Bishop CRISTAL Jean-Baptiste PA-C

## 2024-02-23 RX ORDER — OXYCODONE HYDROCHLORIDE 10 MG/1
10-15 TABLET ORAL EVERY 6 HOURS PRN
Qty: 42 TABLET | Refills: 0 | Status: SHIPPED | OUTPATIENT
Start: 2024-02-23 | End: 2024-03-05

## 2024-02-23 NOTE — TELEPHONE ENCOUNTER
Pt wife calling and said there is something wrong with the pharmacy e-prescribing. Would like prescription fax to [id number] - [place] - [address]

## 2024-02-27 DIAGNOSIS — Z98.1 S/P LUMBAR FUSION: Primary | ICD-10-CM

## 2024-03-01 ENCOUNTER — TELEPHONE (OUTPATIENT)
Dept: ORTHOPEDICS | Facility: CLINIC | Age: 74
End: 2024-03-01
Payer: MEDICARE

## 2024-03-01 NOTE — TELEPHONE ENCOUNTER
[facility] Call Center    Phone Message    May a detailed message be left on voicemail: yes     Reason for Call: Other: [facility]  she needs an order for a PT visit for next week.     Action Taken: Other: Te     Travel Screening: Not Applicable

## 2024-03-03 ENCOUNTER — MYC REFILL (OUTPATIENT)
Dept: ORTHOPEDICS | Facility: CLINIC | Age: 74
End: 2024-03-03
Payer: MEDICARE

## 2024-03-03 DIAGNOSIS — Z98.1 S/P LUMBAR FUSION: ICD-10-CM

## 2024-03-03 RX ORDER — OXYCODONE HYDROCHLORIDE 10 MG/1
10-15 TABLET ORAL EVERY 6 HOURS PRN
Qty: 42 TABLET | Refills: 0 | Status: CANCELLED | OUTPATIENT
Start: 2024-03-03

## 2024-03-04 ENCOUNTER — TELEPHONE (OUTPATIENT)
Dept: ORTHOPEDICS | Facility: CLINIC | Age: 74
End: 2024-03-04
Payer: MEDICARE

## 2024-03-04 RX ORDER — OXYCODONE HYDROCHLORIDE 5 MG/1
5-10 TABLET ORAL EVERY 6 HOURS PRN
Qty: 56 TABLET | Refills: 0 | Status: SHIPPED | OUTPATIENT
Start: 2024-03-04 | End: 2024-03-11

## 2024-03-04 NOTE — TELEPHONE ENCOUNTER
PDMP reviewed, last refilled 2/23 for #42 of oxycodone 10 mg (avg 4-5 tabs/day of 10 mg). Will refill for 5-10 mg oxycodone, max 8 tabs/day. Patient was chronically on opioids pre-op and will need have have opioids refilled by his other provider Danyelle Roman after 3 month surgical brynn on 3/27. Please call patient with plan.

## 2024-03-04 NOTE — TELEPHONE ENCOUNTER
[facility] Call Center    Phone Message    May a detailed message be left on voicemail: yes     Reason for Call: Other: pt daughter calling Roxy HOLLY back. Can you call her back?     Action Taken: Other: te    Travel Screening: Not Applicable

## 2024-03-04 NOTE — TELEPHONE ENCOUNTER
RN called patient to let him and wife know about prescription placed. Let them know about 90 day global period and will need to follow up with PCP about medication.     Roxy Mejia RN

## 2024-03-05 ENCOUNTER — TELEPHONE (OUTPATIENT)
Dept: ORTHOPEDICS | Facility: CLINIC | Age: 74
End: 2024-03-05

## 2024-03-05 NOTE — TELEPHONE ENCOUNTER
Retail Pharmacy Prior Authorization Team   Phone: [phone]    Note: Due to record-high volumes, our turn-around time is taking longer than usual . We are currently 10 business days behind in the pools.   We are working diligently to submit all requests in a timely manner and in the order they are received. Please only flag TRUE URGENT requests as high priority to the pool at this time.   If you have questions - please send a note/message in the active PA encounter and send back to the RPPA (Retail Pharmacy Prior Authorization) team [[id number]].    If you have more specific questions about our process please reach out to our supervisor Cara Oliver.   Thank you!

## 2024-03-06 ENCOUNTER — MEDICAL CORRESPONDENCE (OUTPATIENT)
Dept: HEALTH INFORMATION MANAGEMENT | Facility: CLINIC | Age: 74
End: 2024-03-06

## 2024-03-06 ENCOUNTER — ANCILLARY PROCEDURE (OUTPATIENT)
Dept: GENERAL RADIOLOGY | Facility: CLINIC | Age: 74
End: 2024-03-06
Attending: ORTHOPAEDIC SURGERY
Payer: MEDICARE

## 2024-03-06 ENCOUNTER — OFFICE VISIT (OUTPATIENT)
Dept: ORTHOPEDICS | Facility: CLINIC | Age: 74
End: 2024-03-06
Attending: ORTHOPAEDIC SURGERY
Payer: MEDICARE

## 2024-03-06 DIAGNOSIS — M80.00XA AGE-RELATED OSTEOPOROSIS WITH CURRENT PATHOLOGICAL FRACTURE, INITIAL ENCOUNTER: Primary | ICD-10-CM

## 2024-03-06 DIAGNOSIS — M51.34 ADJACENT SEGMENT DISEASE OF THORACIC SPINE WITH HISTORY OF FUSION PROCEDURE: ICD-10-CM

## 2024-03-06 DIAGNOSIS — Z98.1 S/P LUMBAR FUSION: ICD-10-CM

## 2024-03-06 DIAGNOSIS — Z98.1 ADJACENT SEGMENT DISEASE OF THORACIC SPINE WITH HISTORY OF FUSION PROCEDURE: ICD-10-CM

## 2024-03-06 PROCEDURE — 99024 POSTOP FOLLOW-UP VISIT: CPT | Performed by: ORTHOPAEDIC SURGERY

## 2024-03-06 PROCEDURE — 77073 BONE LENGTH STUDIES: CPT | Performed by: STUDENT IN AN ORGANIZED HEALTH CARE EDUCATION/TRAINING PROGRAM

## 2024-03-06 PROCEDURE — 72082 X-RAY EXAM ENTIRE SPI 2/3 VW: CPT | Performed by: STUDENT IN AN ORGANIZED HEALTH CARE EDUCATION/TRAINING PROGRAM

## 2024-03-06 RX ORDER — LIDOCAINE 4 G/G
1 PATCH TOPICAL EVERY 24 HOURS
Qty: 60 PATCH | Refills: 3 | Status: SHIPPED | OUTPATIENT
Start: 2024-03-06

## 2024-03-06 ASSESSMENT — PATIENT HEALTH QUESTIONNAIRE - PHQ9: SUM OF ALL RESPONSES TO PHQ QUESTIONS 1-9: 7

## 2024-03-06 NOTE — LETTER
3/6/2024         RE: Emile Wood  [id number]  [place] [address]        Dear Colleague,    Thank you for referring your patient, Emile Wood, to the [facility] [place]. Please see a copy of my visit note below.    Chief concern:  2-month lumbar spine surgery postoperative follow-up; T12-Pelvis fusion decompression at L4-5 and L5-S1 on 12/26/2023 to address flatback and stenosis above his previous fusion mass.    History of present illness:  Emile Wood is a 73 year old male with significant medical comorbidity who returns today now 2 months s/p above surgery.  Due to Emile's medical complexity, I elected to perform a less invasive procedure and excluded the planned SPO and TLIF due to concern of major complication.  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  We started TLSO after last visit.  Emile's primary area of pain is at lumbosacral junction as before. He feels relief with leaning forward on his walker.  Denies any new focal strength or sensory deficits.  No bowel or bladder dysfunction.     Objective:  Examination patient is alert and oriented with no acute distress  Nonlabored respiration with no audible wheeze  Examination of the surgical incision reveals well-healed surgical incision without surrounding erythema or fluctuance  Patient is able to stand and walk with his walker; obvious sagittal imbalance  Tenderness over lumbosacral junction, no tenderness over thoracolumbar junction  Resisted strength testing reveals 5/5 strength throughout the lower extremities  Sensations intact light light touch in L3-S1 distributions  There is no pathologic reflexes for patellar Achilles tendon    New radiographs today show stable alignment of proximal junction. Today angle measured from T11 superior endplate to L1 inferior endplate is 28 degrees, which is actually slightly improved compared to previous  radiographs which measured 30.     Impression plan:  2-months status post above.  Is making gradual improvements. I think his soreness is now muscle soreness as he has increased ambulation. TLSO seems to be helping.  Making gradual improvement and T12 fracture appears radiographically stable.  Can start to advance activities as tolerated.  Continued physical therapy will be at discretion of the treating physical therapist.  Will continue pain medications with goal of weaning however he has been on pain medications for a long time so expect a prolonged taper.  I would like to see patient back in clinic in 3 months with repeat radiographs.     Nacho Zavaleta MD  Orthopedic Spine Surgeon  , Department of Orthopedic Surgery

## 2024-03-06 NOTE — NURSING NOTE
Reason For Visit:   Chief Complaint   Patient presents with    RECHECK     DOS: 12/26/23 // stealth/O arm assisted reinstrumentation Lumbar 2-3, posterior spinal fusion Thoracic 11-Sacral 1, bilateral pelvic fixation ([place]), SPO and Transforaminal lumbar interbody fusion (TLIF) Lumbar 1-2, Lumbar 3-4, Lumbar 4-5, Lumbar 5-Sacral 1       Primary MD: Luciano Hogan  Ref. MD: EST    ?  No  Occupation retired.     Date of injury: No  Type of injury: No     Date of surgery & Type:  1999 Lumbar herniated disc     2016 L2-3 BILATERAL LAMINECTOMY DISCECTOMY, L3-5 DECOMPRESSIVE LAMINECTOMY      10/27/2022 Redo left L2-3 decompression with L2-3 Transforaminal Lumbar Interbody Fusion L2-3 posterior lateral instrumentation and fusion      12/26/23 reinstrumentation Lumbar 2-3, posterior spinal fusion Thoracic 12-Sacral 1, bilateral pelvic fixation (Elliott), Fowler-Plummer Osteotomy Lumbar 4-5, Sacral 1      Smoker: No  Request smoking cessation information: No    There were no vitals taken for this visit.    Pain Assessment  Patient Currently in Pain: Yes  0-10 Pain Scale: 8  Primary Pain Location: Back    Oswestry (RUPERTO) Questionnaire        2/7/2024    11:02 AM   OSWESTRY DISABILITY INDEX   Count 9   Sum 32   Oswestry Score (%) 71.11 %        Visual Analog Pain Scale  Back Pain Scale 0-10: 8  Right leg pain: 0  Left leg pain: 0  Neck Pain Scale 0-10: 0  Right arm pain: 0  Left arm pain: 0    Promis 10 Assessment         No data to display                         Magdalena Siu LPN

## 2024-03-07 ENCOUNTER — TELEPHONE (OUTPATIENT)
Dept: ORTHOPEDICS | Facility: CLINIC | Age: 74
End: 2024-03-07
Payer: MEDICARE

## 2024-03-07 NOTE — TELEPHONE ENCOUNTER
[facility] called and gave verbal order to Milly. Also informed that they still need to follow spine protocols. They verified understanding.         -JOHAN Villalobos- [facility]

## 2024-03-07 NOTE — TELEPHONE ENCOUNTER
Milly calling in to get an extension of 1 time for next (3/9/24) and 3 x's for the following week (3/16/24). Please call her back with verbal permission. Also, please confirm if pt should still be following his spine precautions or not

## 2024-03-10 NOTE — PROGRESS NOTES
Chief concern:  2-month lumbar spine surgery postoperative follow-up; T12-Pelvis fusion decompression at L4-5 and L5-S1 on 12/26/2023 to address flatback and stenosis above his previous fusion mass.    History of present illness:  Emile Wood is a 73 year old male with significant medical comorbidity who returns today now 2 months s/p above surgery.  Due to Emile's medical complexity, I elected to perform a less invasive procedure and excluded the planned SPO and TLIF due to concern of major complication.  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  We started TLSO after last visit.  Emile's primary area of pain is at lumbosacral junction as before. He feels relief with leaning forward on his walker.  Denies any new focal strength or sensory deficits.  No bowel or bladder dysfunction.     Objective:  Examination patient is alert and oriented with no acute distress  Nonlabored respiration with no audible wheeze  Examination of the surgical incision reveals well-healed surgical incision without surrounding erythema or fluctuance  Patient is able to stand and walk with his walker; obvious sagittal imbalance  Tenderness over lumbosacral junction, no tenderness over thoracolumbar junction  Resisted strength testing reveals 5/5 strength throughout the lower extremities  Sensations intact light light touch in L3-S1 distributions  There is no pathologic reflexes for patellar Achilles tendon    New radiographs today show stable alignment of proximal junction. Today angle measured from T11 superior endplate to L1 inferior endplate is 28 degrees, which is actually slightly improved compared to previous radiographs which measured 30.     Impression plan:  2-months status post above.  Is making gradual improvements. I think his soreness is now muscle soreness as he has increased ambulation. TLSO seems to be helping.  Making gradual improvement and T12 fracture appears  radiographically stable.  Can start to advance activities as tolerated.  Continued physical therapy will be at discretion of the treating physical therapist.  Will continue pain medications with goal of weaning however he has been on pain medications for a long time so expect a prolonged taper.  I would like to see patient back in clinic in 3 months with repeat radiographs.     Nacho Zavaleta MD  Orthopedic Spine Surgeon  , Department of Orthopedic Surgery

## 2024-03-13 ENCOUNTER — MYC REFILL (OUTPATIENT)
Dept: ORTHOPEDICS | Facility: CLINIC | Age: 74
End: 2024-03-13
Payer: MEDICARE

## 2024-03-13 DIAGNOSIS — Z98.1 S/P LUMBAR FUSION: ICD-10-CM

## 2024-03-14 RX ORDER — METHOCARBAMOL 500 MG/1
500 TABLET, FILM COATED ORAL 4 TIMES DAILY
Qty: 35 TABLET | Refills: 1 | Status: SHIPPED | OUTPATIENT
Start: 2024-03-14 | End: 2024-09-24

## 2024-03-14 RX ORDER — OXYCODONE HYDROCHLORIDE 10 MG/1
5-10 TABLET ORAL EVERY 6 HOURS PRN
Qty: 28 TABLET | Refills: 0 | Status: SHIPPED | OUTPATIENT
Start: 2024-03-14 | End: 2024-03-21

## 2024-03-14 NOTE — TELEPHONE ENCOUNTER
Pdmp reviewed. Signed oxycodone 5-10mg max of 4 tabs per day.    Bishop CRISTAL Jean-Baptiste PA-C

## 2024-03-16 NOTE — TELEPHONE ENCOUNTER
Prior Authorization Not Needed per Insurance    Medication: oxyCODONE (ROXICODONE) 5 MG tablet-PA NOT NEEDED  Insurance Company: [employer] - Phone [phone] Fax [phone]  Expected CoPay:      Pharmacy Filling the Rx: [id number] [place] [id number] AT [facility] [address]  [address]  Pharmacy Notified:  Yes  Patient Notified:  No    Called pharmacy and pharmacy stated that PA is Not Needed at This Time. Pharmacy stated that they received a script for 10 MG and was told to cancel the fill for 5 MG. Pharmacy stated that they have filled the 10 MG on 3/5/2024 quantity 35 tablets and patient has picked up medication.

## 2024-03-21 ENCOUNTER — MEDICAL CORRESPONDENCE (OUTPATIENT)
Dept: HEALTH INFORMATION MANAGEMENT | Facility: CLINIC | Age: 74
End: 2024-03-21
Payer: MEDICARE

## 2024-04-02 ENCOUNTER — APPOINTMENT (OUTPATIENT)
Dept: URBAN - METROPOLITAN AREA CLINIC 256 | Age: 74
Setting detail: DERMATOLOGY
End: 2024-04-03

## 2024-04-02 VITALS — HEIGHT: 65 IN | WEIGHT: 211 LBS

## 2024-04-02 DIAGNOSIS — L82.0 INFLAMED SEBORRHEIC KERATOSIS: ICD-10-CM

## 2024-04-02 DIAGNOSIS — D69.2 OTHER NONTHROMBOCYTOPENIC PURPURA: ICD-10-CM

## 2024-04-02 DIAGNOSIS — L82.1 OTHER SEBORRHEIC KERATOSIS: ICD-10-CM

## 2024-04-02 PROCEDURE — OTHER MIPS QUALITY: OTHER

## 2024-04-02 PROCEDURE — OTHER LIQUID NITROGEN: OTHER

## 2024-04-02 PROCEDURE — OTHER COUNSELING: OTHER

## 2024-04-02 PROCEDURE — 99212 OFFICE O/P EST SF 10 MIN: CPT | Mod: 25

## 2024-04-02 PROCEDURE — 17110 DESTRUCT B9 LESION 1-14: CPT

## 2024-04-02 ASSESSMENT — LOCATION ZONE DERM
LOCATION ZONE: HAND
LOCATION ZONE: TRUNK
LOCATION ZONE: SCALP

## 2024-04-02 ASSESSMENT — LOCATION SIMPLE DESCRIPTION DERM
LOCATION SIMPLE: RIGHT HAND
LOCATION SIMPLE: UPPER BACK
LOCATION SIMPLE: LEFT HAND
LOCATION SIMPLE: SCALP

## 2024-04-02 ASSESSMENT — LOCATION DETAILED DESCRIPTION DERM
LOCATION DETAILED: RIGHT SUPERIOR FRONTAL SCALP
LOCATION DETAILED: LEFT ULNAR DORSAL HAND
LOCATION DETAILED: INFERIOR THORACIC SPINE
LOCATION DETAILED: RIGHT RADIAL DORSAL HAND

## 2024-04-02 NOTE — PROCEDURE: LIQUID NITROGEN
Show Spray Paint Technique Variable?: Yes
Include Z78.9 (Other Specified Conditions Influencing Health Status) As An Associated Diagnosis?: No
Post-Care Instructions: I reviewed with the patient in detail post-care instructions. Patient is to wear sunprotection, and avoid picking at any of the treated lesions. Pt may apply Vaseline to crusted or scabbing areas.
Duration Of Freeze Thaw-Cycle (Seconds): 5
Number Of Freeze-Thaw Cycles: 1 freeze-thaw cycle
Spray Paint Text: The liquid nitrogen was applied to the skin utilizing a spray paint frosting technique.
Medical Necessity Clause: This procedure was medically necessary because the lesions that were treated were:
Medical Necessity Information: It is in your best interest to select a reason for this procedure from the list below. All of these items fulfill various CMS LCD requirements except the new and changing color options.
Detail Level: Detailed
Consent: The patient's consent was obtained including but not limited to risks of crusting, scabbing, blistering, scarring, darker or lighter pigmentary change, recurrence, incomplete removal and infection.

## 2024-04-04 ENCOUNTER — RX ONLY (RX ONLY)
Age: 74
End: 2024-04-04

## 2024-04-04 RX ORDER — VALACYCLOVIR HYDROCHLORIDE 1 G/1
TABLET, FILM COATED ORAL
Qty: 40 | Refills: 2 | Status: ERX | COMMUNITY
Start: 2024-04-04

## 2024-04-26 DIAGNOSIS — Z98.1 S/P LUMBAR FUSION: Primary | ICD-10-CM

## 2024-05-03 ENCOUNTER — TELEPHONE (OUTPATIENT)
Dept: ORTHOPEDICS | Facility: CLINIC | Age: 74
End: 2024-05-03
Payer: MEDICARE

## 2024-05-03 NOTE — TELEPHONE ENCOUNTER
RN called patient to discuss his back brace. [place]. He is wondering if he should wear his back brace during his walks or not. RN states if patient feels supported with the brace he should wear it during his longer hikes and walks. He can stop wearing it for shorter walks and gradually build up to longer endurance. Patient verbalized understanding.     Confirmed appointment on Wednesday with Dr. Zavaleta.     Roxy Mejia RN

## 2024-05-03 NOTE — TELEPHONE ENCOUNTER
Patient Returning Call    Reason for call:  pt wanting to talk to Roxy regarding walking sticks and the brace that he has been fitted for, what are the recomendation to bring pain down     Information relayed to patient:  te sent to clinic    Patient has additional questions:  No      Could we send this information to you in [facility] or would you prefer to receive a phone call?:   Patient would prefer a phone call   Okay to leave a detailed message?: Yes at Cell number on file:    Telephone Information:   Mobile [phone]

## 2024-05-08 ENCOUNTER — ANCILLARY PROCEDURE (OUTPATIENT)
Dept: GENERAL RADIOLOGY | Facility: CLINIC | Age: 74
End: 2024-05-08
Attending: ORTHOPAEDIC SURGERY
Payer: MEDICARE

## 2024-05-08 ENCOUNTER — OFFICE VISIT (OUTPATIENT)
Dept: ORTHOPEDICS | Facility: CLINIC | Age: 74
End: 2024-05-08
Payer: MEDICARE

## 2024-05-08 DIAGNOSIS — M51.34 ADJACENT SEGMENT DISEASE OF THORACIC SPINE WITH HISTORY OF FUSION PROCEDURE: ICD-10-CM

## 2024-05-08 DIAGNOSIS — Z98.1 ADJACENT SEGMENT DISEASE OF THORACIC SPINE WITH HISTORY OF FUSION PROCEDURE: ICD-10-CM

## 2024-05-08 DIAGNOSIS — M40.37 FLATBACK SYNDROME OF LUMBOSACRAL REGION: Primary | ICD-10-CM

## 2024-05-08 PROCEDURE — 99214 OFFICE O/P EST MOD 30 MIN: CPT | Performed by: ORTHOPAEDIC SURGERY

## 2024-05-08 PROCEDURE — 72082 X-RAY EXAM ENTIRE SPI 2/3 VW: CPT | Performed by: STUDENT IN AN ORGANIZED HEALTH CARE EDUCATION/TRAINING PROGRAM

## 2024-05-08 NOTE — LETTER
5/8/2024         RE: Emile Wood  [id number]  [facility] [address]        Dear Colleague,    Thank you for referring your patient, Emile Wood, to the [facility] [place]. Please see a copy of my visit note below.    Chief concern:  5-month lumbar spine surgery postoperative follow-up    History of present illness:  Emile Wood returns today now 5 months s/p T12-Pelvis fusion decompression at L4-5 and L5-S1 on 12/26/2023 to address flatback and stenosis above his previous fusion mass.  Postoperatively had PJF of T12 vertebrae. Interestingly he had increased postoperative SVA despite a good increase in his lumbar lordosis; I suspect this was due to extensor dysfunction above the instrumented levels. He has had increasing ambulatory tolerance and is now walking up to 2 miles a day although he has to use walking sticks and is using 10 mg oxycodone up to TID.  He has less pain at this point. Worries if he will be able to return to walking without pain.     Objective:  Examination patient is alert and oriented with no acute distress  Nonlabored respiration with no audible wheeze  Examination of the surgical incision reveals well-healed surgical incision without surrounding erythema or fluctuance  Very tender over lower lumbar paraspinals in band like distribution. Also tender at TL junction.   Has moderate kyphosis at TL junction. With positive sagittal balance while walking  Patient is able to stand and walk without assistance but does have antalgic gait. Slow to rise from chair and needs to use adjacent table to help get up  Resisted strength testing reveals 5/5 strength throughout the lower extremities  Sensations intact light light touch in L3-S1 distributions  There is no pathologic reflexes for patellar Achilles tendon    AP and lateral views of the lumbar spine reviewed which demonstrate stable hardware alignment without evidence of loosening or  fracture    Impression plan:  5-months status post above surgery.  Although it has been slow, he is improving and is walking 2 miles at this point. I think it is okay for him to start weaning from TLSO.  Counseled patient that I expect his back pain will continue to improve as fracture continues to heal.  Will be key to avoid falls so encouraged him to continue using hiking sticks/trekking poles.  I would like to see patient back in clinic in 3 months with repeat radiographs.     Time spent on this clinical encounter including previsit chart review, history and physical examination, patient counseling and documentation was 30 minutes on the date of encounter.    Nacho Zavaleta MD  , Department of Orthopedic Surgery  [facility]

## 2024-05-08 NOTE — NURSING NOTE
Reason For Visit:   Chief Complaint   Patient presents with    RECHECK     DOS: 12/26/23 // stealth/O arm assisted reinstrumentation Lumbar 2-3, posterior spinal fusion Thoracic 11-Sacral 1, bilateral pelvic fixation ([place]), SPO and Transforaminal lumbar interbody fusion (TLIF) Lumbar 1-2, Lumbar 3-4, Lumbar 4-5, Lumbar 5-Sacral 1       Primary MD: uLciano Hogan  Ref. MD: EST    ?  No  Occupation retired.     Date of injury: No  Type of injury: No     Date of surgery & Type:  1999 Lumbar herniated disc     2016 L2-3 BILATERAL LAMINECTOMY DISCECTOMY, L3-5 DECOMPRESSIVE LAMINECTOMY      10/27/2022 Redo left L2-3 decompression with L2-3 Transforaminal Lumbar Interbody Fusion L2-3 posterior lateral instrumentation and fusion      12/26/23 reinstrumentation Lumbar 2-3, posterior spinal fusion Thoracic 12-Sacral 1, bilateral pelvic fixation ([place]), Fowler-Plummer Osteotomy Lumbar 4-5, Sacral 1      Smoker: No  Request smoking cessation information: No    There were no vitals taken for this visit.    Pain Assessment  Patient Currently in Pain: Yes  0-10 Pain Scale: 7  Primary Pain Location: Back    Oswestry (RUPERTO) Questionnaire        5/8/2024    11:44 AM   OSWESTRY DISABILITY INDEX   Count 9   Sum 26   Oswestry Score (%) 57.78 %        Visual Analog Pain Scale  Back Pain Scale 0-10: 6.5  Right leg pain: 0  Left leg pain: 0  Neck Pain Scale 0-10: 0  Right arm pain: 0  Left arm pain: 0    Promis 10 Assessment         No data to display                         Magdalena Siu LPN

## 2024-05-09 NOTE — PROGRESS NOTES
Chief concern:  5-month lumbar spine surgery postoperative follow-up    History of present illness:  Emile Wood returns today now 5 months s/p T12-Pelvis fusion decompression at L4-5 and L5-S1 on 12/26/2023 to address flatback and stenosis above his previous fusion mass.  Postoperatively had PJF of T12 vertebrae. Interestingly he had increased postoperative SVA despite a good increase in his lumbar lordosis; I suspect this was due to extensor dysfunction above the instrumented levels. He has had increasing ambulatory tolerance and is now walking up to 2 miles a day although he has to use walking sticks and is using 10 mg oxycodone up to TID.  He has less pain at this point. Worries if he will be able to return to walking without pain.     Objective:  Examination patient is alert and oriented with no acute distress  Nonlabored respiration with no audible wheeze  Examination of the surgical incision reveals well-healed surgical incision without surrounding erythema or fluctuance  Very tender over lower lumbar paraspinals in band like distribution. Also tender at TL junction.   Has moderate kyphosis at TL junction. With positive sagittal balance while walking  Patient is able to stand and walk without assistance but does have antalgic gait. Slow to rise from chair and needs to use adjacent table to help get up  Resisted strength testing reveals 5/5 strength throughout the lower extremities  Sensations intact light light touch in L3-S1 distributions  There is no pathologic reflexes for patellar Achilles tendon    AP and lateral views of the lumbar spine reviewed which demonstrate stable hardware alignment without evidence of loosening or fracture    Impression plan:  5-months status post above surgery.  Although it has been slow, he is improving and is walking 2 miles at this point. I think it is okay for him to start weaning from TLSO.  Counseled patient that I expect his back pain will continue to improve as  fracture continues to heal.  Will be key to avoid falls so encouraged him to continue using hiking sticks/trekking poles.  I would like to see patient back in clinic in 3 months with repeat radiographs.     Time spent on this clinical encounter including previsit chart review, history and physical examination, patient counseling and documentation was 30 minutes on the date of encounter.    Nacho Zavaleta MD  , Department of Orthopedic Surgery  [facility]

## 2024-07-16 ENCOUNTER — ANESTHESIA EVENT (OUTPATIENT)
Dept: GASTROENTEROLOGY | Facility: CLINIC | Age: 74
End: 2024-07-16
Payer: MEDICARE

## 2024-07-16 ENCOUNTER — HOSPITAL ENCOUNTER (OUTPATIENT)
Facility: CLINIC | Age: 74
Discharge: HOME | End: 2024-07-16
Attending: INTERNAL MEDICINE | Admitting: INTERNAL MEDICINE
Payer: MEDICARE

## 2024-07-16 ENCOUNTER — ANESTHESIA (OUTPATIENT)
Dept: GASTROENTEROLOGY | Facility: CLINIC | Age: 74
End: 2024-07-16
Payer: MEDICARE

## 2024-07-16 VITALS
OXYGEN SATURATION: 99 % | RESPIRATION RATE: 17 BRPM | HEIGHT: 65 IN | DIASTOLIC BLOOD PRESSURE: 73 MMHG | BODY MASS INDEX: 35.99 KG/M2 | WEIGHT: 216 LBS | SYSTOLIC BLOOD PRESSURE: 121 MMHG | HEART RATE: 61 BPM

## 2024-07-16 LAB — COLONOSCOPY: NORMAL

## 2024-07-16 PROCEDURE — 250N000009 HC RX 250: Performed by: NURSE ANESTHETIST, CERTIFIED REGISTERED

## 2024-07-16 PROCEDURE — 45385 COLONOSCOPY W/LESION REMOVAL: CPT | Performed by: ANESTHESIOLOGY

## 2024-07-16 PROCEDURE — 99100 ANES PT EXTEME AGE<1 YR&>70: CPT | Performed by: NURSE ANESTHETIST, CERTIFIED REGISTERED

## 2024-07-16 PROCEDURE — 999N000010 HC STATISTIC ANES STAT CODE-CRNA PER MINUTE: Performed by: INTERNAL MEDICINE

## 2024-07-16 PROCEDURE — 250N000011 HC RX IP 250 OP 636: Performed by: NURSE ANESTHETIST, CERTIFIED REGISTERED

## 2024-07-16 PROCEDURE — 370N000017 HC ANESTHESIA TECHNICAL FEE, PER MIN: Performed by: INTERNAL MEDICINE

## 2024-07-16 PROCEDURE — 258N000003 HC RX IP 258 OP 636: Performed by: NURSE ANESTHETIST, CERTIFIED REGISTERED

## 2024-07-16 PROCEDURE — 88305 TISSUE EXAM BY PATHOLOGIST: CPT | Mod: TC | Performed by: INTERNAL MEDICINE

## 2024-07-16 PROCEDURE — 45385 COLONOSCOPY W/LESION REMOVAL: CPT | Performed by: INTERNAL MEDICINE

## 2024-07-16 PROCEDURE — 45385 COLONOSCOPY W/LESION REMOVAL: CPT | Performed by: NURSE ANESTHETIST, CERTIFIED REGISTERED

## 2024-07-16 RX ORDER — NALOXONE HYDROCHLORIDE 0.4 MG/ML
0.4 INJECTION, SOLUTION INTRAMUSCULAR; INTRAVENOUS; SUBCUTANEOUS
Status: DISCONTINUED | OUTPATIENT
Start: 2024-07-16 | End: 2024-07-16 | Stop reason: HOSPADM

## 2024-07-16 RX ORDER — NALOXONE HYDROCHLORIDE 0.4 MG/ML
0.2 INJECTION, SOLUTION INTRAMUSCULAR; INTRAVENOUS; SUBCUTANEOUS
Status: DISCONTINUED | OUTPATIENT
Start: 2024-07-16 | End: 2024-07-16 | Stop reason: HOSPADM

## 2024-07-16 RX ORDER — LIDOCAINE 40 MG/G
CREAM TOPICAL
Status: DISCONTINUED | OUTPATIENT
Start: 2024-07-16 | End: 2024-07-16 | Stop reason: HOSPADM

## 2024-07-16 RX ORDER — ONDANSETRON 2 MG/ML
4 INJECTION INTRAMUSCULAR; INTRAVENOUS EVERY 6 HOURS PRN
Status: DISCONTINUED | OUTPATIENT
Start: 2024-07-16 | End: 2024-07-16 | Stop reason: HOSPADM

## 2024-07-16 RX ORDER — FLUMAZENIL 0.1 MG/ML
0.2 INJECTION, SOLUTION INTRAVENOUS
Status: DISCONTINUED | OUTPATIENT
Start: 2024-07-16 | End: 2024-07-16 | Stop reason: HOSPADM

## 2024-07-16 RX ORDER — SODIUM CHLORIDE, SODIUM LACTATE, POTASSIUM CHLORIDE, CALCIUM CHLORIDE 600; 310; 30; 20 MG/100ML; MG/100ML; MG/100ML; MG/100ML
INJECTION, SOLUTION INTRAVENOUS CONTINUOUS PRN
Status: DISCONTINUED | OUTPATIENT
Start: 2024-07-16 | End: 2024-07-16

## 2024-07-16 RX ORDER — LIDOCAINE HYDROCHLORIDE 20 MG/ML
INJECTION, SOLUTION INFILTRATION; PERINEURAL PRN
Status: DISCONTINUED | OUTPATIENT
Start: 2024-07-16 | End: 2024-07-16

## 2024-07-16 RX ORDER — ONDANSETRON 2 MG/ML
4 INJECTION INTRAMUSCULAR; INTRAVENOUS
Status: DISCONTINUED | OUTPATIENT
Start: 2024-07-16 | End: 2024-07-16 | Stop reason: HOSPADM

## 2024-07-16 RX ORDER — PROPOFOL 10 MG/ML
INJECTION, EMULSION INTRAVENOUS CONTINUOUS PRN
Status: DISCONTINUED | OUTPATIENT
Start: 2024-07-16 | End: 2024-07-16

## 2024-07-16 RX ORDER — ONDANSETRON 2 MG/ML
INJECTION INTRAMUSCULAR; INTRAVENOUS PRN
Status: DISCONTINUED | OUTPATIENT
Start: 2024-07-16 | End: 2024-07-16

## 2024-07-16 RX ORDER — PROCHLORPERAZINE MALEATE 5 MG
5 TABLET ORAL EVERY 6 HOURS PRN
Status: DISCONTINUED | OUTPATIENT
Start: 2024-07-16 | End: 2024-07-16 | Stop reason: HOSPADM

## 2024-07-16 RX ORDER — PROPOFOL 10 MG/ML
INJECTION, EMULSION INTRAVENOUS PRN
Status: DISCONTINUED | OUTPATIENT
Start: 2024-07-16 | End: 2024-07-16

## 2024-07-16 RX ORDER — ONDANSETRON 4 MG/1
4 TABLET, ORALLY DISINTEGRATING ORAL EVERY 6 HOURS PRN
Status: DISCONTINUED | OUTPATIENT
Start: 2024-07-16 | End: 2024-07-16 | Stop reason: HOSPADM

## 2024-07-16 RX ORDER — ESCITALOPRAM OXALATE 10 MG/1
10 TABLET ORAL
COMMUNITY
Start: 2024-06-17

## 2024-07-16 RX ORDER — OXYCODONE AND ACETAMINOPHEN 10; 325 MG/1; MG/1
TABLET ORAL
COMMUNITY
Start: 2024-05-31

## 2024-07-16 RX ADMIN — LIDOCAINE HYDROCHLORIDE 50 MG: 20 INJECTION, SOLUTION INFILTRATION; PERINEURAL at 10:04

## 2024-07-16 RX ADMIN — SODIUM CHLORIDE, POTASSIUM CHLORIDE, SODIUM LACTATE AND CALCIUM CHLORIDE: 600; 310; 30; 20 INJECTION, SOLUTION INTRAVENOUS at 10:04

## 2024-07-16 RX ADMIN — PROPOFOL 30 MG: 10 INJECTION, EMULSION INTRAVENOUS at 10:06

## 2024-07-16 RX ADMIN — ONDANSETRON 4 MG: 2 INJECTION INTRAMUSCULAR; INTRAVENOUS at 10:07

## 2024-07-16 RX ADMIN — PROPOFOL 150 MCG/KG/MIN: 10 INJECTION, EMULSION INTRAVENOUS at 10:04

## 2024-07-16 ASSESSMENT — ACTIVITIES OF DAILY LIVING (ADL)
ADLS_ACUITY_SCORE: 38

## 2024-07-16 ASSESSMENT — COPD QUESTIONNAIRES: COPD: 0

## 2024-07-16 ASSESSMENT — ENCOUNTER SYMPTOMS: DYSRHYTHMIAS: 1

## 2024-07-16 NOTE — H&P
[facility] - Pre-Endoscopy History and Physical     Emile Wood MRN# [id number]   YOB: 1950 Age: 73 year old     Date of Procedure: 7/16/2024  Primary care provider: No Ref-Primary, Physician  Type of Endoscopy: Colonoscopy  Reason for Procedure: Surveillance  Type of Anesthesia Anticipated: MAC    HPI:    Emile is a 73 year old male who will be undergoing the above procedure.      A history and physical has been performed, 6/17/24, reviewed.     The patient's medications and allergies have been reviewed. The risks and benefits of the procedure and the sedation options and risks were discussed with the patient.  All questions were answered and informed consent was obtained.      He denies a personal or family history of anesthesia complications or bleeding disorders.     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        Past Medical History:   Diagnosis Date    Alcohol dependence, continuous drinking behavior (H)     Alcoholic polyneuropathy (H24)     Arthritis     knee and back    CAD (coronary artery disease)     Carotid artery stenosis     Chronic diastolic CHF (congestive heart failure) (H)     Chronic sinusitis     Cocaine abuse in remission (H)     in remission since treatment in 1987    Flatback syndrome of lumbosacral region     Gout, chronic     Heart murmur     Hypertension     Irregular heart beat     hx at fib with 3 cardioversions    Numbness and tingling     peripheral neuropathy    Pacemaker     Peripheral neuropathy     Stented  coronary artery     stent and CABG    Thyroid disease     Unspecified atrial fibrillation (H)         Past Surgical History:   Procedure Laterality Date    AORTIC VALVE REPLACEMENT          ARTHROPLASTY KNEE  2013    Procedure: ARTHROPLASTY KNEE;  LEFT TOTAL KNEE ARTHROPLASTY ;  Surgeon: Luciano Mcgraw MD;  Location: [facility] OR    c[facility]g      CARDIAC SURGERY  2002    [facility]    COLONOSCOPY N/A 2018    Procedure: COLONOSCOPY;;  Surgeon: John Vilchis MD;  Location:  OR    ESOPHAGOSCOPY, GASTROSCOPY, DUODENOSCOPY (EGD), COMBINED N/A 2018    Procedure: COMBINED ESOPHAGOSCOPY, GASTROSCOPY, DUODENOSCOPY (EGD);  ESOPHAGOSCOPY, GASTROSCOPY, DUODENOSCOPY WITH BIOPSIES AND POLYPECTOMY, COLONOSCOPY WITH POLYPECTOMIES;  Surgeon: John Vilchis MD;  Location:  OR     TRIAL OPTION LAAC WATCHMAN DEVICE      OPTICAL TRACKING SYSTEM FUSION POSTERIOR SPINE LUMBAR N/A 10/27/2022    Procedure: Redo left L2-3 decompression with L2-3 Transforaminal Lumbar Interbody Fusion L2-3 posterior lateral instrumentation and fusion;  Surgeon: Aung Gallego MD;  Location:  OR    OPTICAL TRACKING SYSTEM FUSION SPINE POSTERIOR LUMBAR THREE+ LEVELS N/A 2023    Procedure: O-arm/Stealth assisted reinstrumentation Lumbar 2-3, posterior spinal fusion Thoracic 12-Sacral 1, bilateral pelvic fixation ([place]), Fowler-Plummer Osteotomy Lumbar 4-5, Sacral 1, use of morselized local autograft, allograft bone and Bone Morphogenetic Protein;  Surgeon: Nacho Zavaleta MD;  Location: [facility] OR    ORTHOPEDIC SURGERY      hallie knee, L4, c5, shoulder       Social History     Tobacco Use    Smoking status: Former     Current packs/day: 0.00     Types: Cigarettes     Quit date:      Years since quittin.5     Passive exposure: Past    Smokeless tobacco: Former    Tobacco comments:     smoke cigars   Substance Use Topics    Alcohol use: Yes     Alcohol/week: 35.0 - 42.0 standard drinks of alcohol     Types: 35 - 42  Standard drinks or equivalent per week     Comment: daily- 4 beers, sometimes glass of wine, shot of whiskey       Family History   Problem Relation Age of Onset    Cerebrovascular Disease Mother     Cerebrovascular Disease Father     Lung Cancer Father     Heart Disease Brother     Anesthesia Reaction No family hx of     Thrombosis No family hx of        Prior to Admission medications    Medication Sig Start Date End Date Taking? Authorizing Provider   acetaminophen (TYLENOL) 325 MG tablet Take 650 mg by mouth as needed 5/27/21  Yes Reported, Patient   allopurinol (ZYLOPRIM) 300 MG tablet Take 300 mg by mouth every morning 9/30/22  Yes Reported, Patient   amiodarone (PACERONE) 200 MG tablet Take 200 mg by mouth every morning 2/23/22  Yes Reported, Patient   amitriptyline (ELAVIL) 10 MG tablet Take 10 mg by mouth at bedtime   Yes Reported, Patient   amLODIPine (NORVASC) 5 MG tablet Take 1 tablet (5 mg) by mouth daily 1/3/24  Yes Claudine Nicholson MD   amoxicillin (AMOXIL) 500 MG capsule Take 2,000 mg by mouth as needed Dental work 3/27/22  Yes Reported, Patient   aspirin (ASA) 81 MG chewable tablet Take 81 mg by mouth every morning 9/1/23  Yes Reported, Patient   atorvastatin (LIPITOR) 80 MG tablet Take 80 mg by mouth at bedtime   Yes Unknown, Entered By History   B Complex Vitamins (VITAMIN B COMPLEX PO) Take 1 tablet by mouth every morning   Yes Reported, Patient   Calcium Carb-Cholecalciferol 600-800 MG-UNIT TABS Take 1 tablet by mouth daily (with lunch)   Yes Reported, Patient   escitalopram (LEXAPRO) 10 MG tablet Take 10 mg by mouth 6/17/24  Yes Reported, Patient   folic acid (FOLVITE) 1 MG tablet Take 1 tablet (1 mg) by mouth daily 1/3/24  Yes Claudine Nicholson MD   furosemide (LASIX) 20 MG tablet Take 1 tablet (20 mg) by mouth daily 1/3/24  Yes Claudine Nicholson MD   levothyroxine (SYNTHROID/LEVOTHROID) 175 MCG tablet Take 175 mcg by mouth every morning 2/15/22  Yes Reported, Patient   methocarbamol (ROBAXIN)  500 MG tablet Take 1 tablet (500 mg) by mouth 4 times daily 3/14/24  Yes Nacho Zavaleta MD   multivitamin w/minerals (THERA-VIT-M) tablet Take 1 tablet by mouth daily 1/3/24  Yes Claudine Nicholson MD   oxyCODONE-acetaminophen (PERCOCET)  MG per tablet Take 1 tablet by mouth every six to eight hours as needed* 5/31/24  Yes Reported, Patient   Pregabalin (LYRICA) 200 MG capsule Take 2 capsules (400mg) in the morning and 1 capsule (200mg) in the evening. 1/3/24  Yes Henrique Mccann MD   vitamin B-12 (CYANOCOBALAMIN) 1000 MCG tablet Take 1,000 mcg by mouth every morning   Yes Reported, Patient   Lidocaine (LIDOCARE) 4 % Patch Place 1 patch onto the skin every 24 hours To prevent lidocaine toxicity, patient should be patch free for 12 hrs daily. 3/6/24   Nacho Zavaleta MD   Lidocaine (LIDOCARE) 4 % Patch Place 1 patch onto the skin every 24 hours To prevent lidocaine toxicity, patient should be patch free for 12 hrs daily. 1/2/24   Abelardo Cardenas NP   menthol (ICY HOT) 5 % PTCH Apply 1 patch topically every 8 hours as needed for muscle soreness 1/2/24   Abelardo Cardenas NP   naloxone (NARCAN) 4 MG/0.1ML nasal spray Spray 1 spray (4 mg) into one nostril alternating nostrils as needed for opioid reversal every 2-3 minutes until assistance arrives 2/7/24   Nacho Zavaleta MD   nitroglycerin (NITROSTAT) 0.4 MG SL tablet Place 1 tablet under the tongue every 5 minutes as needed for chest pain (CALL 911 IF NOT IMPROVED AFTER THREE CONSECUTIVE DOSES). 1/22/13   Raji Grace MD   oxyCODONE IR (ROXICODONE) 10 MG tablet Take 0.5-1 tablets (5-10 mg) by mouth every 6 hours as needed for severe pain (max of 4 tabs per day) 3/22/24   Nati Miller PA-C   polyethylene glycol (MIRALAX) 17 GM/Dose powder Take 17 g by mouth daily 12/29/23   Abelardo Cardenas, NP   romosozumab-aqqg (EVENITY) 105 MG/1.17ML SOSY injection Inject 210 mg Subcutaneous every 28 days 3/1/24 1/31/25  Reported, Patient  "  sildenafil (VIAGRA) 100 MG tablet Take 100 mg by mouth daily as needed (sexual activity) 1/3/22   Reported, Patient       No Known Allergies     REVIEW OF SYSTEMS:   A comprehensive ROS was negative    PHYSICAL EXAM:   BP (!) 159/89   Pulse 71   Resp 18   Ht 1.651 m (5' 5\")   Wt 98 kg (216 lb)   SpO2 97%   BMI 35.94 kg/m   Estimated body mass index is 35.94 kg/m  as calculated from the following:    Height as of this encounter: 1.651 m (5' 5\").    Weight as of this encounter: 98 kg (216 lb).   GENERAL APPEARANCE: alert, and oriented  MENTAL STATUS: alert      IMPRESSION   Surveillance    PLAN:   Colonoscopy    The above has been forwarded to the consulting provider.      Signed Electronically by: Moy Nance DO  July 16, 2024              "

## 2024-07-16 NOTE — ANESTHESIA CARE TRANSFER NOTE
Patient: Emile Wood    Procedure: Procedure(s):  COLONOSCOPY, FLEXIBLE, WITH LESION REMOVAL USING SNARE       Diagnosis: History of colon polyps [Z86.010]  Screen for colon cancer [Z12.11]  Diagnosis Additional Information: No value filed.    Anesthesia Type:   MAC     Note:    Oropharynx: oropharynx clear of all foreign objects and spontaneously breathing  Level of Consciousness: drowsy  Oxygen Supplementation: nasal cannula  Level of Supplemental Oxygen (L/min / FiO2): 3  Independent Airway: airway patency satisfactory and stable  Dentition: dentition unchanged  Vital Signs Stable: post-procedure vital signs reviewed and stable  Report to RN Given: handoff report given  Patient transferred to: Phase II (endo phase II)    Handoff Report: Identifed the Patient, Identified the Reponsible Provider, Reviewed the pertinent medical history, Discussed the surgical course, Reviewed Intra-OP anesthesia mangement and issues during anesthesia, Set expectations for post-procedure period and Allowed opportunity for questions and acknowledgement of understanding      Vitals:  Vitals Value Taken Time   /72 07/16/24 1024   Temp     Pulse 61 07/16/24 1026   Resp 13 07/16/24 1026   SpO2 98 % 07/16/24 1026   Vitals shown include unfiled device data.    Electronically Signed By: KARLA Burkett CRNA  July 16, 2024  10:27 AM

## 2024-07-16 NOTE — ANESTHESIA POSTPROCEDURE EVALUATION
Patient: Emile Wood    Procedure: Procedure(s):  COLONOSCOPY, FLEXIBLE, WITH LESION REMOVAL USING SNARE       Anesthesia Type:  MAC    Note:  Disposition: Outpatient   Postop Pain Control: Uneventful            Sign Out: Well controlled pain   PONV: No   Neuro/Psych: Uneventful            Sign Out: Acceptable/Baseline neuro status   Airway/Respiratory: Uneventful            Sign Out: Acceptable/Baseline resp. status   CV/Hemodynamics: Uneventful            Sign Out: Acceptable CV status   Other NRE:    DID A NON-ROUTINE EVENT OCCUR? No           Last vitals:  Vitals Value Taken Time   /73 07/16/24 1040   Temp     Pulse 59 07/16/24 1042   Resp 32 07/16/24 1043   SpO2 98 % 07/16/24 1042   Vitals shown include unfiled device data.    Electronically Signed By: Cornelia Mathews  July 16, 2024  11:24 AM

## 2024-07-16 NOTE — ANESTHESIA PREPROCEDURE EVALUATION
Anesthesia Pre-Procedure Evaluation    Patient: Emile Wood   MRN: [id number] : 1950        Procedure : Procedure(s):  Colonoscopy          Past Medical History:   Diagnosis Date    Alcohol dependence, continuous drinking behavior (H)     Alcoholic polyneuropathy (H24)     Arthritis     knee and back    CAD (coronary artery disease)     Carotid artery stenosis     Chronic diastolic CHF (congestive heart failure) (H)     Chronic sinusitis     Cocaine abuse in remission (H)     in remission since treatment in     Flatback syndrome of lumbosacral region     Gout, chronic     Heart murmur     Hypertension     Irregular heart beat     hx at fib with 3 cardioversions    Numbness and tingling     peripheral neuropathy    Pacemaker     Peripheral neuropathy     Stented coronary artery     stent and CABG    Thyroid disease     Unspecified atrial fibrillation (H)       Past Surgical History:   Procedure Laterality Date    AORTIC VALVE REPLACEMENT          ARTHROPLASTY KNEE  2013    Procedure: ARTHROPLASTY KNEE;  LEFT TOTAL KNEE ARTHROPLASTY ;  Surgeon: Luciano Mcgraw MD;  Location:  OR    [facility]      CARDIAC SURGERY      [facility]    COLONOSCOPY N/A 2018    Procedure: COLONOSCOPY;;  Surgeon: John Vilchis MD;  Location:  OR    ESOPHAGOSCOPY, GASTROSCOPY, DUODENOSCOPY (EGD), COMBINED N/A 2018    Procedure: COMBINED ESOPHAGOSCOPY, GASTROSCOPY, DUODENOSCOPY (EGD);  ESOPHAGOSCOPY, GASTROSCOPY, DUODENOSCOPY WITH BIOPSIES AND POLYPECTOMY, COLONOSCOPY WITH POLYPECTOMIES;  Surgeon: John Vilchis MD;  Location:  OR     TRIAL OPTION LAAC WATCHMAN DEVICE      OPTICAL TRACKING SYSTEM FUSION POSTERIOR SPINE LUMBAR N/A 10/27/2022    Procedure: Redo left L2-3 decompression with L2-3 Transforaminal Lumbar Interbody Fusion L2-3 posterior lateral instrumentation and fusion;  Surgeon: Aung Gallego MD;  Location:  OR    OPTICAL TRACKING SYSTEM FUSION SPINE POSTERIOR LUMBAR THREE+  LEVELS N/A 2023    Procedure: O-arm/Stealth assisted reinstrumentation Lumbar 2-3, posterior spinal fusion Thoracic 12-Sacral 1, bilateral pelvic fixation ([place]), Fowler-Plummer Osteotomy Lumbar 4-5, Sacral 1, use of morselized local autograft, allograft bone and Bone Morphogenetic Protein;  Surgeon: Nacho Zavaleta MD;  Location: [facility] OR    ORTHOPEDIC SURGERY      hallie knee, L4, c5, shoulder      No Known Allergies   Social History     Tobacco Use    Smoking status: Former     Current packs/day: 0.00     Types: Cigarettes     Quit date:      Years since quittin.5     Passive exposure: Past    Smokeless tobacco: Former    Tobacco comments:     smoke cigars   Substance Use Topics    Alcohol use: Yes     Alcohol/week: 35.0 - 42.0 standard drinks of alcohol     Types: 35 - 42 Standard drinks or equivalent per week     Comment: daily- 4 beers, sometimes glass of wine, shot of whiskey      Wt Readings from Last 1 Encounters:   24 98 kg (216 lb)        Anesthesia Evaluation            ROS/MED HX  ENT/Pulmonary:    (-) COPD and sleep apnea   Neurologic:    (-) no CVA   Cardiovascular:     (+) Dyslipidemia hypertension- -  CAD -  - -      CHF  Last EF: 60      pacemaker,          dysrhythmias, a-fib, Irregular Heartbeat/Palpitations, valvular problems/murmurs type: AS s/p tavr.         METS/Exercise Tolerance:     Hematologic:       Musculoskeletal:       GI/Hepatic:    (-) GERD and liver disease   Renal/Genitourinary:    (-) renal disease   Endo:       Psychiatric/Substance Use:       Infectious Disease:       Malignancy:       Other:            Physical Exam    Airway        Mallampati: II   TM distance: > 3 FB   Neck ROM: full     Respiratory Devices and Support         Dental       (+) Minor Abnormalities - some fillings, tiny chips      Cardiovascular   cardiovascular exam normal          Pulmonary   pulmonary exam normal                OUTSIDE LABS:  CBC:   Lab Results   Component Value Date  "   WBC 7.4 12/26/2023    WBC 6.7 09/09/2014    HGB 10.8 (L) 12/27/2023    HGB 13.8 12/26/2023    HCT 44.2 12/26/2023    HCT 41.8 09/09/2014     12/26/2023     09/09/2014     BMP:   Lab Results   Component Value Date     01/01/2024     12/30/2023    POTASSIUM 3.9 01/01/2024    POTASSIUM 3.8 12/31/2023    CHLORIDE 102 01/01/2024    CHLORIDE 101 12/30/2023    CO2 30 (H) 01/01/2024    CO2 26 12/30/2023    BUN 13.0 01/01/2024    BUN 16.8 12/30/2023    CR 0.59 (L) 01/01/2024    CR 0.64 (L) 12/30/2023     (H) 01/03/2024     (H) 01/01/2024     COAGS:   Lab Results   Component Value Date    PTT 32 12/26/2023    INR 1.05 12/26/2023     POC: No results found for: \"BGM\", \"HCG\", \"HCGS\"  HEPATIC:   Lab Results   Component Value Date    ALBUMIN 4.1 12/01/2023    PROTTOTAL 7.0 12/01/2023    ALT 22 12/01/2023    AST 31 12/01/2023    ALKPHOS 84 12/01/2023    BILITOTAL 0.8 12/01/2023     OTHER:   Lab Results   Component Value Date    PH 7.36 12/26/2023    LACT 1.5 12/27/2023    A1C 6.2 (H) 12/26/2023    CATE 8.3 (L) 01/01/2024    PHOS 3.2 01/01/2024    MAG 2.2 01/01/2024    CRP 0.30 07/11/2005       Anesthesia Plan    ASA Status:  3       Anesthesia Type: MAC.              Consents    Anesthesia Plan(s) and associated risks, benefits, and realistic alternatives discussed. Questions answered and patient/representative(s) expressed understanding.     - Discussed:     - Discussed with:  Patient            Postoperative Care            Comments:               Cornelia Mathews    I have reviewed the pertinent notes and labs in the chart from the past 30 days and (re)examined the patient.  Any updates or changes from those notes are reflected in this note.             # Drug Induced Platelet Defect: home medication list includes an antiplatelet medication  # Obesity: Estimated body mass index is 35.94 kg/m  as calculated from the following:    Height as of this encounter: 1.651 m (5' 5\").    Weight as " of this encounter: 98 kg (216 lb).

## 2024-07-17 LAB
PATH REPORT.COMMENTS IMP SPEC: NORMAL
PATH REPORT.COMMENTS IMP SPEC: NORMAL
PATH REPORT.FINAL DX SPEC: NORMAL
PATH REPORT.GROSS SPEC: NORMAL
PATH REPORT.MICROSCOPIC SPEC OTHER STN: NORMAL
PATH REPORT.RELEVANT HX SPEC: NORMAL
PHOTO IMAGE: NORMAL

## 2024-07-17 PROCEDURE — 88305 TISSUE EXAM BY PATHOLOGIST: CPT | Mod: 26 | Performed by: PATHOLOGY

## 2024-08-21 ENCOUNTER — OFFICE VISIT (OUTPATIENT)
Dept: ORTHOPEDICS | Facility: CLINIC | Age: 74
End: 2024-08-21
Payer: MEDICARE

## 2024-08-21 ENCOUNTER — ANCILLARY PROCEDURE (OUTPATIENT)
Dept: GENERAL RADIOLOGY | Facility: CLINIC | Age: 74
End: 2024-08-21
Attending: ORTHOPAEDIC SURGERY
Payer: MEDICARE

## 2024-08-21 DIAGNOSIS — Z98.1 S/P LUMBAR FUSION: Primary | ICD-10-CM

## 2024-08-21 DIAGNOSIS — F33.9 DEPRESSION, RECURRENT (H): ICD-10-CM

## 2024-08-21 DIAGNOSIS — M40.37 FLATBACK SYNDROME OF LUMBOSACRAL REGION: ICD-10-CM

## 2024-08-21 DIAGNOSIS — Z98.1 HISTORY OF LUMBAR SPINAL FUSION: ICD-10-CM

## 2024-08-21 DIAGNOSIS — I50.32 CHRONIC DIASTOLIC CHF (CONGESTIVE HEART FAILURE) (H): ICD-10-CM

## 2024-08-21 DIAGNOSIS — Z98.1 S/P LUMBAR FUSION: ICD-10-CM

## 2024-08-21 DIAGNOSIS — Z98.1 ADJACENT SEGMENT DISEASE OF THORACIC SPINE WITH HISTORY OF FUSION PROCEDURE: Primary | ICD-10-CM

## 2024-08-21 DIAGNOSIS — I48.91 ATRIAL FIBRILLATION, UNSPECIFIED TYPE (H): ICD-10-CM

## 2024-08-21 DIAGNOSIS — M51.34 ADJACENT SEGMENT DISEASE OF THORACIC SPINE WITH HISTORY OF FUSION PROCEDURE: Primary | ICD-10-CM

## 2024-08-21 DIAGNOSIS — M51.369 LUMBAR ADJACENT SEGMENT DISEASE WITH SPONDYLOLISTHESIS: ICD-10-CM

## 2024-08-21 DIAGNOSIS — M43.16 LUMBAR ADJACENT SEGMENT DISEASE WITH SPONDYLOLISTHESIS: ICD-10-CM

## 2024-08-21 PROCEDURE — 77073 BONE LENGTH STUDIES: CPT | Performed by: STUDENT IN AN ORGANIZED HEALTH CARE EDUCATION/TRAINING PROGRAM

## 2024-08-21 PROCEDURE — 72082 X-RAY EXAM ENTIRE SPI 2/3 VW: CPT | Performed by: STUDENT IN AN ORGANIZED HEALTH CARE EDUCATION/TRAINING PROGRAM

## 2024-08-21 PROCEDURE — 99213 OFFICE O/P EST LOW 20 MIN: CPT | Mod: GC | Performed by: ORTHOPAEDIC SURGERY

## 2024-08-21 NOTE — NURSING NOTE
Physical Therapy Visit    Referred by: Asa Lyons MD; Medical Diagnosis (from order):    Diagnosis Information      Diagnosis    905.6 (ICD-9-CM) - S43.101S (ICD-10-CM) - AC separation, right, sequela              Visit: 4    Visit Type: Daily Treatment Note    SUBJECTIVE                                                                                                               No new concerns  Pain rating 6/10 right top of shoulder    OBJECTIVE                                                                                                                     Range of Motion (ROM)   (degrees unless noted; active unless noted; norms in ( ); negative=lacking to 0, positive=beyond 0)   Shoulder:     - Flexion (180):        • Right: 108 pain  Passive: 120 pain   End feel: guarded (abnormal)    - Abduction (180):        • Right: 115 pain Passive: 140 pain   End feel: guarded (abnormal)    - Internal Rotation at 45°:         • Right: 55 Passive: 60 pain End feel: guarded (abnormal)    - External Rotation at 45°:         • Right: 65 pain Passive: 90 pain End feel: guarded (abnormal)    - External Rotation at 90° (90):         • Right: pain      TREATMENT                                                                                                                  Therapeutic Exercise:  Seated Pulley flexion/abduction - discussion of function, pain and progress  Prone shoulder extension 3# x 10  Prone shoulder rows 3# x 20  Prone mid trap x 10  Prone lower trap x 10 modified with elbows flexed  Child pose for passive flexion stretch 4 x 10 sec  4 pt crawling  Supine AAROM flexion stressing end range  Towel roll lying for pec stretch    Manual Therapy:  AC, SC, GH joint mobilization all planar motions grades I-II painful and limited tolerance.  right guarding and hypomobile         Therapeutic Activity:  Discussion of acceptable pain and use of mild heat prior to exercises and ice after  Discussion of limitation of  Reason For Visit:   Chief Complaint   Patient presents with    RECHECK     DOS: 12/26/23 // stealth/O arm assisted reinstrumentation Lumbar 2-3, posterior spinal fusion Thoracic 11-Sacral 1, bilateral pelvic fixation ([place]), SPO and Transforaminal lumbar interbody fusion (TLIF) Lumbar 1-2, Lumbar 3-4, Lumbar 4-5, Lumbar 5-Sacral 1       Primary MD: Dr. AMANDA Enriquez  Ref. MD: EST    ?  No  Occupation retired.     Date of injury: No  Type of injury: No     Date of surgery & Type:  1999 Lumbar herniated disc     2016 L2-3 BILATERAL LAMINECTOMY DISCECTOMY, L3-5 DECOMPRESSIVE LAMINECTOMY      10/27/2022 Redo left L2-3 decompression with L2-3 Transforaminal Lumbar Interbody Fusion L2-3 posterior lateral instrumentation and fusion      12/26/23 reinstrumentation Lumbar 2-3, posterior spinal fusion Thoracic 12-Sacral 1, bilateral pelvic fixation ([place]), Fowler-Plummer Osteotomy Lumbar 4-5, Sacral 1      Smoker: No  Request smoking cessation information: No    There were no vitals taken for this visit.    Pain Assessment  Patient Currently in Pain: Yes  0-10 Pain Scale: 9 (when walking its the worst)  Primary Pain Location: Back    Oswestry (RUPERTO) Questionnaire        8/21/2024    10:56 AM   OSWESTRY DISABILITY INDEX   Count 10   Sum 31   Oswestry Score (%) 62 %        Visual Analog Pain Scale  Back Pain Scale 0-10: 8.5 (when walking)  Right leg pain: 0  Left leg pain: 0  Neck Pain Scale 0-10: 0  Right arm pain: 0  Left arm pain: 0    Promis 10 Assessment         No data to display                         Magdalena Siu LPN    ROM as it relates to pain       ASSESSMENT                                                                                                             Pain levels persists  Limitations in ROM active and passive due to pain  Palp tenderness persists over A/C joint      PLAN                                                                                                                           Suggestions for next session as indicated: Continue emphasis on ROM and pain level  PNE         Therapy procedure time and total treatment time can be found documented on the Time Entry flowsheet

## 2024-08-21 NOTE — LETTER
8/21/2024      Emile Wood  [address]  [facility] [address]      Dear Colleague,    Thank you for referring your patient, Emile Wood, to the [facility] [place]. Please see a copy of my visit note below.    Spine Surgery Return Clinic Visit      Chief Complaint:   RECHECK (DOS: 12/26/23 // stealth/O arm assisted reinstrumentation Lumbar 2-3, posterior spinal fusion Thoracic 11-Sacral 1, bilateral pelvic fixation ([place]), SPO and Transforaminal lumbar interbody fusion (TLIF) Lumbar 1-2, Lumbar 3-4, Lumbar 4-5, Lumbar 5-Sacral 1)      Interval HPI:  Symptom Profile Including: location of symptoms, onset, severity, exacerbating/alleviating factors, previous treatments:        Emile Wood is a 73 year old male who presents today for follow-up 8 months status post the above-stated procedure.  He is doing well overall.  He continues to mobilize using sticks bilaterally.  He states that he continues to have some pain in the middle of his back pointing towards the top of the incision.  His pain is associated with activities and improves with rest.  He denies any radiating pain down his legs or any numbness or tingling that is not his baseline neuropathy.  He is currently able to walk about a mile and a half using the sticks but requires frequent breaks up to 3 times.  He is accompanied by his wife and they both denied any other concerns at this time.  Denies any bowel or bladder symptoms.  States that he sometimes has urinary incontinence but it is due to timing rather than difficulty with sensation.            Past Medical History:     Past Medical History:   Diagnosis Date     Alcohol dependence, continuous drinking behavior (H)      Alcoholic polyneuropathy (H24)      Arthritis     knee and back     CAD (coronary artery disease)      Carotid artery stenosis      Chronic diastolic CHF (congestive heart failure) (H)      Chronic sinusitis      Cocaine abuse in  remission (H)     in remission since treatment in 1987     Flatback syndrome of lumbosacral region      Gout, chronic      Heart murmur      Hypertension      Irregular heart beat     hx at fib with 3 cardioversions     Numbness and tingling     peripheral neuropathy     Pacemaker      Peripheral neuropathy      Stented coronary artery     stent and CABG     Thyroid disease      Unspecified atrial fibrillation (H)             Past Surgical History:     Past Surgical History:   Procedure Laterality Date     AORTIC VALVE REPLACEMENT      2021     ARTHROPLASTY KNEE  04/29/2013    Procedure: ARTHROPLASTY KNEE;  LEFT TOTAL KNEE ARTHROPLASTY ;  Surgeon: Luciano Mcgraw MD;  Location:  OR     [facility]       CARDIAC SURGERY  2002    [facility]     COLONOSCOPY N/A 05/02/2018    Procedure: COLONOSCOPY;;  Surgeon: John Vilchis MD;  Location:  OR     COLONOSCOPY N/A 7/16/2024    Procedure: COLONOSCOPY, FLEXIBLE, WITH LESION REMOVAL USING SNARE;  Surgeon: Moy Nance DO;  Location:  GI     ESOPHAGOSCOPY, GASTROSCOPY, DUODENOSCOPY (EGD), COMBINED N/A 05/02/2018    Procedure: COMBINED ESOPHAGOSCOPY, GASTROSCOPY, DUODENOSCOPY (EGD);  ESOPHAGOSCOPY, GASTROSCOPY, DUODENOSCOPY WITH BIOPSIES AND POLYPECTOMY, COLONOSCOPY WITH POLYPECTOMIES;  Surgeon: John Vilchis MD;  Location:  OR     HC TRIAL OPTION LAAC WATCHMAN DEVICE       OPTICAL TRACKING SYSTEM FUSION POSTERIOR SPINE LUMBAR N/A 10/27/2022    Procedure: Redo left L2-3 decompression with L2-3 Transforaminal Lumbar Interbody Fusion L2-3 posterior lateral instrumentation and fusion;  Surgeon: Aung Gallego MD;  Location:  OR     OPTICAL TRACKING SYSTEM FUSION SPINE POSTERIOR LUMBAR THREE+ LEVELS N/A 12/26/2023    Procedure: O-arm/Stealth assisted reinstrumentation Lumbar 2-3, posterior spinal fusion Thoracic 12-Sacral 1, bilateral pelvic fixation ([place]), Fowler-Plummer Osteotomy Lumbar 4-5, Sacral 1, use of morselized local autograft, allograft bone and  Bone Morphogenetic Protein;  Surgeon: Nacho Zavaleta MD;  Location: [facility] OR     ORTHOPEDIC SURGERY      hallie knee, L4, c5, shoulder            Social History:     Social History     Tobacco Use     Smoking status: Former     Current packs/day: 0.00     Types: Cigarettes     Quit date:      Years since quittin.6     Passive exposure: Past     Smokeless tobacco: Former     Tobacco comments:     smoke cigars   Substance Use Topics     Alcohol use: Yes     Alcohol/week: 35.0 - 42.0 standard drinks of alcohol     Types: 35 - 42 Standard drinks or equivalent per week     Comment: daily- 4 beers, sometimes glass of wine, shot of whiskey            Family History:     Family History   Problem Relation Age of Onset     Cerebrovascular Disease Mother      Cerebrovascular Disease Father      Lung Cancer Father      Heart Disease Brother      Anesthesia Reaction No family hx of      Thrombosis No family hx of             Allergies:   No Known Allergies         Medications:     Current Outpatient Medications   Medication Sig Dispense Refill     acetaminophen (TYLENOL) 325 MG tablet Take 650 mg by mouth as needed       allopurinol (ZYLOPRIM) 300 MG tablet Take 300 mg by mouth every morning       amiodarone (PACERONE) 200 MG tablet Take 200 mg by mouth every morning       amitriptyline (ELAVIL) 10 MG tablet Take 10 mg by mouth at bedtime       amLODIPine (NORVASC) 5 MG tablet Take 1 tablet (5 mg) by mouth daily       amoxicillin (AMOXIL) 500 MG capsule Take 2,000 mg by mouth as needed Dental work       aspirin (ASA) 81 MG chewable tablet Take 81 mg by mouth every morning       atorvastatin (LIPITOR) 80 MG tablet Take 80 mg by mouth at bedtime       B Complex Vitamins (VITAMIN B COMPLEX PO) Take 1 tablet by mouth every morning       Calcium Carb-Cholecalciferol 600-800 MG-UNIT TABS Take 1 tablet by mouth daily (with lunch)       escitalopram (LEXAPRO) 10 MG tablet Take 10 mg by mouth       folic acid (FOLVITE) 1 MG  tablet Take 1 tablet (1 mg) by mouth daily       furosemide (LASIX) 20 MG tablet Take 1 tablet (20 mg) by mouth daily       levothyroxine (SYNTHROID/LEVOTHROID) 175 MCG tablet Take 175 mcg by mouth every morning       Lidocaine (LIDOCARE) 4 % Patch Place 1 patch onto the skin every 24 hours To prevent lidocaine toxicity, patient should be patch free for 12 hrs daily. 60 patch 3     Lidocaine (LIDOCARE) 4 % Patch Place 1 patch onto the skin every 24 hours To prevent lidocaine toxicity, patient should be patch free for 12 hrs daily. 30 patch 0     menthol (ICY HOT) 5 % PTCH Apply 1 patch topically every 8 hours as needed for muscle soreness 30 patch 0     methocarbamol (ROBAXIN) 500 MG tablet Take 1 tablet (500 mg) by mouth 4 times daily 35 tablet 1     multivitamin w/minerals (THERA-VIT-M) tablet Take 1 tablet by mouth daily       naloxone (NARCAN) 4 MG/0.1ML nasal spray Spray 1 spray (4 mg) into one nostril alternating nostrils as needed for opioid reversal every 2-3 minutes until assistance arrives 0.2 mL 3     nitroglycerin (NITROSTAT) 0.4 MG SL tablet Place 1 tablet under the tongue every 5 minutes as needed for chest pain (CALL 911 IF NOT IMPROVED AFTER THREE CONSECUTIVE DOSES). 25 tablet 0     oxyCODONE IR (ROXICODONE) 10 MG tablet Take 0.5-1 tablets (5-10 mg) by mouth every 6 hours as needed for severe pain (max of 4 tabs per day) 28 tablet 0     oxyCODONE-acetaminophen (PERCOCET)  MG per tablet Take 1 tablet by mouth every six to eight hours as needed*       polyethylene glycol (MIRALAX) 17 GM/Dose powder Take 17 g by mouth daily 510 g 0     Pregabalin (LYRICA) 200 MG capsule Take 2 capsules (400mg) in the morning and 1 capsule (200mg) in the evening. 10 capsule 0     romosozumab-aqqg (EVENITY) 105 MG/1.17ML SOSY injection Inject 210 mg Subcutaneous every 28 days       sildenafil (VIAGRA) 100 MG tablet Take 100 mg by mouth daily as needed (sexual activity)       vitamin B-12 (CYANOCOBALAMIN) 1000 MCG  tablet Take 1,000 mcg by mouth every morning       No current facility-administered medications for this visit.             Review of Systems:   A focused musculoskeletal and neurologic ROS was performed with pertinent positives and negatives noted in the HPI.  Additional systems were also reviewed and are documented at the bottom of the note.         Physical Exam:   Vitals: There were no vitals taken for this visit.  Musculoskeletal, Neurologic, and Spine:   Cervical spine:    Appearance -no gross step-offs, kyphosis.    Motor -     C5: Deltoids R 5/5 and L 5/5 strength    C6: Biceps R 5/5 and L 5/5 strength     C7: Triceps R 5/5 and L 5/5 strength     C8:  R 5/5 and L 5/5 strength     T1: Dorsal interossei R 4/5 and L 4/5 strength        Sensation: intact to light touch in C5-T1      Special Tests -      Emery's Test - Negative       Lumbar Spine:    Appearance - No gross stepoffs or deformities    Motor -     L2-3: Hip flexion 5/5 R and 5/5 L strength          L3/4:  Knee extension R 5/5 and L 5/5 strength         L4/5:  Foot dorsiflexion R 3/5 L 3/5 and       EHL dorsiflexion R 3/5 L 3/5 strength         S1:  Plantarflexion R 5/5 and L 5/5 strength    Sensation: intact to light touch L3-S1 distribution BLE      Neurologic:      REFLEXES Left Right   Biceps 2+ 2+   Triceps 2+ 2+   Brachioradialis 2+ 2+   Patella 2+ 2+   Ankle jerk 2+ 2+   Clonus 0 beats 0 beats     Walks with obvious wide based ataxic gait.  This is worse without the sticks with a lot of balance issues.           Imaging:   We ordered and independently reviewed new radiographs at this clinic visit. The results were discussed with the patient. Findings include:     Reviewed EOS imaging obtained today.  These demonstrate stable overall alignment with what seems to be increased bone density from previous x-rays.  There is stable alignment of the T12 compression fracture with bridging new bone over the anterior column.  No evidence of screw  pullout, lucency, or candida fracture.       Assessment and Plan:     73 year old male presenting for follow up s/p T12-Pelvis fusion decompression at L4-5 and L5-S1 on 12/26/2023 to address flatback and stenosis above his previous fusion mass.  Postoperatively had PJF of T12 vertebrae that seems to be stable and healed at this time based on xrays today.    Reviewed radiographic and clinical findings with Emile and his wife.  We discussed that his overall alignment has been stable and that his fracture is likely to be healed at this point.  Discussed that his bone density has improved while he is on [place].    We also reviewed that considering his balance and gait issues, I am more so concerned regarding spinal compression.  He does have a history of polyneuropathy and weakness in the feet bilaterally with Charcot arthropathy.  However, his gait is concerning and would warrant further evaluation with obtaining cervical and thoracic MRI to evaluate any spinal compression or myelopathy.    They both understand and agree with the plan to proceed with obtaining the MRI.    In the meantime, I recommend working with physical therapy on proprioception and balance.  However, patient states that he would like to wait until after the MRI to proceed with physical therapy.  He will continue working on physical therapy on his own at home.     Follow via phone/video visit after MRI is performed.    --  Serjio Fernández MD  Orthopedic Surgery PGY-4    Patient seen and discussed with Dr. Zavaleta.          Attestation signed by Nacho Zavaltea MD at 8/22/2024 10:59 AM:  I reviewed the patient's history, physical examination and imaging studies with the Resident.  In addition I individually examined the patient and developed the treatment plan. I personally performed greater than 50% of the clinical encounter today.  I agree with the assessment and plan as documented.     Time spent on this clinical encounter by me, independent  of the PA/Resident/Fellow, was 21 minutes. This included chart review, history and physical examination, patient counseling, care coordination and documentation.     Nacho Zavaleta MD  Orthopedic Spine Surgeon  , Department of Orthopedic Surgery      RN faxed imaging order to [facility] [phone]     Roxy Mejia RN      Again, thank you for allowing me to participate in the care of your patient.        Sincerely,        Nacho Zavaleta MD

## 2024-08-21 NOTE — PROGRESS NOTES
Spine Surgery Return Clinic Visit      Chief Complaint:   RECHECK (DOS: 12/26/23 // stealth/O arm assisted reinstrumentation Lumbar 2-3, posterior spinal fusion Thoracic 11-Sacral 1, bilateral pelvic fixation ([place]), SPO and Transforaminal lumbar interbody fusion (TLIF) Lumbar 1-2, Lumbar 3-4, Lumbar 4-5, Lumbar 5-Sacral 1)      Interval HPI:  Symptom Profile Including: location of symptoms, onset, severity, exacerbating/alleviating factors, previous treatments:        Emile Wood is a 73 year old male who presents today for follow-up 8 months status post the above-stated procedure.  He is doing well overall.  He continues to mobilize using sticks bilaterally.  He states that he continues to have some pain in the middle of his back pointing towards the top of the incision.  His pain is associated with activities and improves with rest.  He denies any radiating pain down his legs or any numbness or tingling that is not his baseline neuropathy.  He is currently able to walk about a mile and a half using the sticks but requires frequent breaks up to 3 times.  He is accompanied by his wife and they both denied any other concerns at this time.  Denies any bowel or bladder symptoms.  States that he sometimes has urinary incontinence but it is due to timing rather than difficulty with sensation.            Past Medical History:     Past Medical History:   Diagnosis Date    Alcohol dependence, continuous drinking behavior (H)     Alcoholic polyneuropathy (H24)     Arthritis     knee and back    CAD (coronary artery disease)     Carotid artery stenosis     Chronic diastolic CHF (congestive heart failure) (H)     Chronic sinusitis     Cocaine abuse in remission (H)     in remission since treatment in 1987    Flatback syndrome of lumbosacral region     Gout, chronic     Heart murmur     Hypertension     Irregular heart beat     hx at fib with 3 cardioversions    Numbness and tingling     peripheral neuropathy     Pacemaker     Peripheral neuropathy     Stented coronary artery     stent and CABG    Thyroid disease     Unspecified atrial fibrillation (H)             Past Surgical History:     Past Surgical History:   Procedure Laterality Date    AORTIC VALVE REPLACEMENT      2021    ARTHROPLASTY KNEE  04/29/2013    Procedure: ARTHROPLASTY KNEE;  LEFT TOTAL KNEE ARTHROPLASTY ;  Surgeon: Luciano Mcgraw MD;  Location:  OR    [facility]      CARDIAC SURGERY  2002    [facility]    COLONOSCOPY N/A 05/02/2018    Procedure: COLONOSCOPY;;  Surgeon: John Vilchis MD;  Location:  OR    COLONOSCOPY N/A 7/16/2024    Procedure: COLONOSCOPY, FLEXIBLE, WITH LESION REMOVAL USING SNARE;  Surgeon: Moy Nance DO;  Location:  GI    ESOPHAGOSCOPY, GASTROSCOPY, DUODENOSCOPY (EGD), COMBINED N/A 05/02/2018    Procedure: COMBINED ESOPHAGOSCOPY, GASTROSCOPY, DUODENOSCOPY (EGD);  ESOPHAGOSCOPY, GASTROSCOPY, DUODENOSCOPY WITH BIOPSIES AND POLYPECTOMY, COLONOSCOPY WITH POLYPECTOMIES;  Surgeon: John Vilchis MD;  Location:  OR     TRIAL OPTION LAAC WATCHMAN DEVICE      OPTICAL TRACKING SYSTEM FUSION POSTERIOR SPINE LUMBAR N/A 10/27/2022    Procedure: Redo left L2-3 decompression with L2-3 Transforaminal Lumbar Interbody Fusion L2-3 posterior lateral instrumentation and fusion;  Surgeon: Aung Gallego MD;  Location:  OR    OPTICAL TRACKING SYSTEM FUSION SPINE POSTERIOR LUMBAR THREE+ LEVELS N/A 12/26/2023    Procedure: O-arm/Stealth assisted reinstrumentation Lumbar 2-3, posterior spinal fusion Thoracic 12-Sacral 1, bilateral pelvic fixation ([place]), Fowler-Plummer Osteotomy Lumbar 4-5, Sacral 1, use of morselized local autograft, allograft bone and Bone Morphogenetic Protein;  Surgeon: Nacho Zavaleta MD;  Location:  OR    ORTHOPEDIC SURGERY      hallie knee, L4, c5, shoulder            Social History:     Social History     Tobacco Use    Smoking status: Former     Current packs/day: 0.00     Types: Cigarettes     Quit date:  2018     Years since quittin.6     Passive exposure: Past    Smokeless tobacco: Former    Tobacco comments:     smoke cigars   Substance Use Topics    Alcohol use: Yes     Alcohol/week: 35.0 - 42.0 standard drinks of alcohol     Types: 35 - 42 Standard drinks or equivalent per week     Comment: daily- 4 beers, sometimes glass of wine, shot of whiskey            Family History:     Family History   Problem Relation Age of Onset    Cerebrovascular Disease Mother     Cerebrovascular Disease Father     Lung Cancer Father     Heart Disease Brother     Anesthesia Reaction No family hx of     Thrombosis No family hx of             Allergies:   No Known Allergies         Medications:     Current Outpatient Medications   Medication Sig Dispense Refill    acetaminophen (TYLENOL) 325 MG tablet Take 650 mg by mouth as needed      allopurinol (ZYLOPRIM) 300 MG tablet Take 300 mg by mouth every morning      amiodarone (PACERONE) 200 MG tablet Take 200 mg by mouth every morning      amitriptyline (ELAVIL) 10 MG tablet Take 10 mg by mouth at bedtime      amLODIPine (NORVASC) 5 MG tablet Take 1 tablet (5 mg) by mouth daily      amoxicillin (AMOXIL) 500 MG capsule Take 2,000 mg by mouth as needed Dental work      aspirin (ASA) 81 MG chewable tablet Take 81 mg by mouth every morning      atorvastatin (LIPITOR) 80 MG tablet Take 80 mg by mouth at bedtime      B Complex Vitamins (VITAMIN B COMPLEX PO) Take 1 tablet by mouth every morning      Calcium Carb-Cholecalciferol 600-800 MG-UNIT TABS Take 1 tablet by mouth daily (with lunch)      escitalopram (LEXAPRO) 10 MG tablet Take 10 mg by mouth      folic acid (FOLVITE) 1 MG tablet Take 1 tablet (1 mg) by mouth daily      furosemide (LASIX) 20 MG tablet Take 1 tablet (20 mg) by mouth daily      levothyroxine (SYNTHROID/LEVOTHROID) 175 MCG tablet Take 175 mcg by mouth every morning      Lidocaine (LIDOCARE) 4 % Patch Place 1 patch onto the skin every 24 hours To prevent lidocaine  toxicity, patient should be patch free for 12 hrs daily. 60 patch 3    Lidocaine (LIDOCARE) 4 % Patch Place 1 patch onto the skin every 24 hours To prevent lidocaine toxicity, patient should be patch free for 12 hrs daily. 30 patch 0    menthol (ICY HOT) 5 % PTCH Apply 1 patch topically every 8 hours as needed for muscle soreness 30 patch 0    methocarbamol (ROBAXIN) 500 MG tablet Take 1 tablet (500 mg) by mouth 4 times daily 35 tablet 1    multivitamin w/minerals (THERA-VIT-M) tablet Take 1 tablet by mouth daily      naloxone (NARCAN) 4 MG/0.1ML nasal spray Spray 1 spray (4 mg) into one nostril alternating nostrils as needed for opioid reversal every 2-3 minutes until assistance arrives 0.2 mL 3    nitroglycerin (NITROSTAT) 0.4 MG SL tablet Place 1 tablet under the tongue every 5 minutes as needed for chest pain (CALL 911 IF NOT IMPROVED AFTER THREE CONSECUTIVE DOSES). 25 tablet 0    oxyCODONE IR (ROXICODONE) 10 MG tablet Take 0.5-1 tablets (5-10 mg) by mouth every 6 hours as needed for severe pain (max of 4 tabs per day) 28 tablet 0    oxyCODONE-acetaminophen (PERCOCET)  MG per tablet Take 1 tablet by mouth every six to eight hours as needed*      polyethylene glycol (MIRALAX) 17 GM/Dose powder Take 17 g by mouth daily 510 g 0    Pregabalin (LYRICA) 200 MG capsule Take 2 capsules (400mg) in the morning and 1 capsule (200mg) in the evening. 10 capsule 0    romosozumab-aqqg (EVENITY) 105 MG/1.17ML SOSY injection Inject 210 mg Subcutaneous every 28 days      sildenafil (VIAGRA) 100 MG tablet Take 100 mg by mouth daily as needed (sexual activity)      vitamin B-12 (CYANOCOBALAMIN) 1000 MCG tablet Take 1,000 mcg by mouth every morning       No current facility-administered medications for this visit.             Review of Systems:   A focused musculoskeletal and neurologic ROS was performed with pertinent positives and negatives noted in the HPI.  Additional systems were also reviewed and are documented at the  bottom of the note.         Physical Exam:   Vitals: There were no vitals taken for this visit.  Musculoskeletal, Neurologic, and Spine:   Cervical spine:    Appearance -no gross step-offs, kyphosis.    Motor -     C5: Deltoids R 5/5 and L 5/5 strength    C6: Biceps R 5/5 and L 5/5 strength     C7: Triceps R 5/5 and L 5/5 strength     C8:  R 5/5 and L 5/5 strength     T1: Dorsal interossei R 4/5 and L 4/5 strength        Sensation: intact to light touch in C5-T1      Special Tests -      Emery's Test - Negative       Lumbar Spine:    Appearance - No gross stepoffs or deformities    Motor -     L2-3: Hip flexion 5/5 R and 5/5 L strength          L3/4:  Knee extension R 5/5 and L 5/5 strength         L4/5:  Foot dorsiflexion R 3/5 L 3/5 and       EHL dorsiflexion R 3/5 L 3/5 strength         S1:  Plantarflexion R 5/5 and L 5/5 strength    Sensation: intact to light touch L3-S1 distribution BLE      Neurologic:      REFLEXES Left Right   Biceps 2+ 2+   Triceps 2+ 2+   Brachioradialis 2+ 2+   Patella 2+ 2+   Ankle jerk 2+ 2+   Clonus 0 beats 0 beats     Walks with obvious wide based ataxic gait.  This is worse without the sticks with a lot of balance issues.           Imaging:   We ordered and independently reviewed new radiographs at this clinic visit. The results were discussed with the patient. Findings include:     Reviewed EOS imaging obtained today.  These demonstrate stable overall alignment with what seems to be increased bone density from previous x-rays.  There is stable alignment of the T12 compression fracture with bridging new bone over the anterior column.  No evidence of screw pullout, lucency, or candida fracture.       Assessment and Plan:     73 year old male presenting for follow up s/p T12-Pelvis fusion decompression at L4-5 and L5-S1 on 12/26/2023 to address flatback and stenosis above his previous fusion mass.  Postoperatively had PJF of T12 vertebrae that seems to be stable and healed at this  time based on xrays today.    Reviewed radiographic and clinical findings with Emlie and his wife.  We discussed that his overall alignment has been stable and that his fracture is likely to be healed at this point.  Discussed that his bone density has improved while he is on Evenity.    We also reviewed that considering his balance and gait issues, I am more so concerned regarding spinal compression.  He does have a history of polyneuropathy and weakness in the feet bilaterally with Charcot arthropathy.  However, his gait is concerning and would warrant further evaluation with obtaining cervical and thoracic MRI to evaluate any spinal compression or myelopathy.    They both understand and agree with the plan to proceed with obtaining the MRI.    In the meantime, I recommend working with physical therapy on proprioception and balance.  However, patient states that he would like to wait until after the MRI to proceed with physical therapy.  He will continue working on physical therapy on his own at home.     Follow via phone/video visit after MRI is performed.    --  Serjio Fernández MD  Orthopedic Surgery PGY-4    Patient seen and discussed with Dr. Zavaleta.

## 2024-08-22 PROBLEM — F33.9 DEPRESSION, RECURRENT (H): Status: ACTIVE | Noted: 2024-08-22

## 2024-09-18 ENCOUNTER — VIRTUAL VISIT (OUTPATIENT)
Dept: ORTHOPEDICS | Facility: CLINIC | Age: 74
End: 2024-09-18
Payer: MEDICARE

## 2024-09-18 DIAGNOSIS — Z98.1 ADJACENT SEGMENT DISEASE OF THORACIC SPINE WITH HISTORY OF FUSION PROCEDURE: ICD-10-CM

## 2024-09-18 DIAGNOSIS — M43.16 LUMBAR ADJACENT SEGMENT DISEASE WITH SPONDYLOLISTHESIS: ICD-10-CM

## 2024-09-18 DIAGNOSIS — M51.34 ADJACENT SEGMENT DISEASE OF THORACIC SPINE WITH HISTORY OF FUSION PROCEDURE: ICD-10-CM

## 2024-09-18 DIAGNOSIS — M40.37 FLATBACK SYNDROME OF LUMBOSACRAL REGION: Primary | ICD-10-CM

## 2024-09-18 DIAGNOSIS — Z98.1 S/P LUMBAR FUSION: ICD-10-CM

## 2024-09-18 DIAGNOSIS — M51.369 LUMBAR ADJACENT SEGMENT DISEASE WITH SPONDYLOLISTHESIS: ICD-10-CM

## 2024-09-18 PROCEDURE — 99443 PR PHYSICIAN TELEPHONE EVALUATION 21-30 MIN: CPT | Mod: 93 | Performed by: ORTHOPAEDIC SURGERY

## 2024-09-18 NOTE — PROGRESS NOTES
Chief Concern: telephone visit to review balance, back pain; review MRI    HPI: 73 year old male s/p T12-pelvis posterior spinal fusion on 12/26/2023 complicated by postoperative proximal junction failure (T12 superior endplate fracture with kyphotic deformity).  MRI was ordered due to patient's balance difficulties.     His concerns regarding difficulties with balance have decreased.  He is still walking 1-2 miles daily although this takes three  oxycodone; also continues to use his walking sticks.  When he wakes up his pain is 8/10 and after icing and stretching he gets to a 7/10.  His primary area of pain is 3-4 inches caudal to the most cephalad aspect of the incision, generally in the middle of his back.     We also discussed that prior to his surgery with me his primary pain generator was lumbosacral junction on the right side with radicular pain as well.     He is having his 8th Evenity injection in very near future.      He had a mechanical fall over the Labor Day weekend in which he struck his back on furniture. Since that time he has had increased back pain and questions if there is a rib fracture or potentially exacerbation of fracture at T12.  We discussed obtaining CT scan to evaluate for rib fracture or new fracture within fusion mass.     MRI report discusses disc protrusion at T8-T9 with flattening of ventral spinal cord.  There remains circumferential spinal fluid  on MRI at this level.      Impresion and Plan.   73 year old  munira now 9 months s/p T12-pelvis posterior spinal fusion complicated by proximal junction fracture at T12 with thoracolumbar kyphosis.  Reviewed treatment options. No apparent high grade stenosis at thoracolumbar junction but does have spinal cord effacement at T8-9.  Recommended getting CT scan thoracic and lumbar spine to evaluate for fusion status as well as assess response to 8 months evenity.  I think the long term solution for Emile's symptoms would be to address the  PJF and thoracolumbar kyphosis; obviously there is risk associated with additional surgery which would involve extension proximally, I think would need to extend up to T10 and would advocate for cement augmentation at upper instrumented levels and the supra-adjacent levels to reduce risk of insufficiency fracture.  Patient was agreeable with this plan. Will have video visit in 2 weeks to review results     32:38 on phone with patient and his wife today  Additional 14 minutes spent on care coordination and documentation on date of encoutner.       Nacho Zavaleta MD  , Department of Orthopedic Surgery  [facility]

## 2024-09-18 NOTE — PROGRESS NOTES
Reason For Visit:   Chief Complaint   Patient presents with    RECHECK     phone visit [phone], Discuss MRIs and back pain       Primary MD: Dr. Ekaterina Mckeon [facility] General physicians Deidre León. MD: EST    ?  No  Occupation retired.     Date of injury: No  Type of injury: No     Date of surgery & Type:  1999 Lumbar herniated disc     2016 L2-3 BILATERAL LAMINECTOMY DISCECTOMY, L3-5 DECOMPRESSIVE LAMINECTOMY      10/27/2022 Redo left L2-3 decompression with L2-3 Transforaminal Lumbar Interbody Fusion L2-3 posterior lateral instrumentation and fusion      12/26/23 reinstrumentation Lumbar 2-3, posterior spinal fusion Thoracic 12-Sacral 1, bilateral pelvic fixation ([place]), Fowler-Plummer Osteotomy Lumbar 4-5, Sacral 1      Smoker: No  Request smoking cessation information: No    There were no vitals taken for this visit.    Pain Assessment  Patient Currently in Pain: Yes  0-10 Pain Scale: 8  Primary Pain Location: Back    Oswestry (RUPERTO) Questionnaire        8/21/2024    10:56 AM   OSWESTRY DISABILITY INDEX   Count 10   Sum 31   Oswestry Score (%) 62 %        Visual Analog Pain Scale  Back Pain Scale 0-10: 8  Right leg pain: 0  Left leg pain: 0  Neck Pain Scale 0-10: 0  Right arm pain: 0  Left arm pain: 0    Promis 10 Assessment         No data to display                         Magdalena Siu LPN

## 2024-09-18 NOTE — LETTER
9/18/2024      Emile Wood  [address] [place] [address]      Dear Colleague,    Thank you for referring your patient, Emile Wood, to the [facility] [place]. Please see a copy of my visit note below.    Reason For Visit:   Chief Complaint   Patient presents with     RECHECK     phone visit [phone], Discuss MRIs and back pain       Primary MD: Dr. Ekaterina Mckeon [facility] General physicians De[place]re   Ref. MD: EST    ?  No  Occupation retired.     Date of injury: No  Type of injury: No     Date of surgery & Type:  1999 Lumbar herniated disc     2016 L2-3 BILATERAL LAMINECTOMY DISCECTOMY, L3-5 DECOMPRESSIVE LAMINECTOMY      10/27/2022 Redo left L2-3 decompression with L2-3 Transforaminal Lumbar Interbody Fusion L2-3 posterior lateral instrumentation and fusion      12/26/23 reinstrumentation Lumbar 2-3, posterior spinal fusion Thoracic 12-Sacral 1, bilateral pelvic fixation ([place]), Fowler-Plummer Osteotomy Lumbar 4-5, Sacral 1      Smoker: No  Request smoking cessation information: No    There were no vitals taken for this visit.    Pain Assessment  Patient Currently in Pain: Yes  0-10 Pain Scale: 8  Primary Pain Location: Back    Oswestry (RUPERTO) Questionnaire        8/21/2024    10:56 AM   OSWESTRY DISABILITY INDEX   Count 10   Sum 31   Oswestry Score (%) 62 %        Visual Analog Pain Scale  Back Pain Scale 0-10: 8  Right leg pain: 0  Left leg pain: 0  Neck Pain Scale 0-10: 0  Right arm pain: 0  Left arm pain: 0    Promis 10 Assessment         No data to display                         Magdalena Siu LPN     Chief Concern: telephone visit to review balance, back pain; review MRI    HPI: 73 year old male s/p T12-pelvis posterior spinal fusion on 12/26/2023 complicated by postoperative proximal junction failure (T12 superior endplate fracture with kyphotic deformity).  MRI was ordered due to patient's balance difficulties.     His concerns  regarding difficulties with balance have decreased.  He is still walking 1-2 miles daily although this takes three  oxycodone; also continues to use his walking sticks.  When he wakes up his pain is 8/10 and after icing and stretching he gets to a 7/10.  His primary area of pain is 3-4 inches caudal to the most cephalad aspect of the incision, generally in the middle of his back.     We also discussed that prior to his surgery with me his primary pain generator was lumbosacral junction on the right side with radicular pain as well.     He is having his 8th Evenity injection in very near future.      He had a mechanical fall over the Labor Day weekend in which he struck his back on furniture. Since that time he has had increased back pain and questions if there is a rib fracture or potentially exacerbation of fracture at T12.  We discussed obtaining CT scan to evaluate for rib fracture or new fracture within fusion mass.     MRI report discusses disc protrusion at T8-T9 with flattening of ventral spinal cord.  There remains circumferential spinal fluid  on MRI at this level.      Impresion and Plan.   73 year old  munira now 9 months s/p T12-pelvis posterior spinal fusion complicated by proximal junction fracture at T12 with thoracolumbar kyphosis.  Reviewed treatment options. No apparent high grade stenosis at thoracolumbar junction but does have spinal cord effacement at T8-9.  Recommended getting CT scan thoracic and lumbar spine to evaluate for fusion status as well as assess response to 8 months evenity.  I think the long term solution for Emile's symptoms would be to address the PJF and thoracolumbar kyphosis; obviously there is risk associated with additional surgery which would involve extension proximally, I think would need to extend up to T10 and would advocate for cement augmentation at upper instrumented levels and the supra-adjacent levels to reduce risk of insufficiency fracture.  Patient was  agreeable with this plan. Will have video visit in 2 weeks to review results     32:38 on phone with patient and his wife today  Additional 14 minutes spent on care coordination and documentation on date of encoutner.       Nacho Zavaleta MD  , Department of Orthopedic Surgery  [facility]      RN faxed CT orders to [facility] in [place]     Roxy Mejia RN        Again, thank you for allowing me to participate in the care of your patient.        Sincerely,        Nacho Zavaleta MD

## 2024-09-25 ENCOUNTER — TRANSFERRED RECORDS (OUTPATIENT)
Dept: HEALTH INFORMATION MANAGEMENT | Facility: CLINIC | Age: 74
End: 2024-09-25
Payer: MEDICARE

## 2024-10-04 ENCOUNTER — TELEPHONE (OUTPATIENT)
Dept: ORTHOPEDICS | Facility: CLINIC | Age: 74
End: 2024-10-04
Payer: MEDICARE

## 2024-10-04 NOTE — TELEPHONE ENCOUNTER
Patient is calling regarding CT scans from [facility] done on 9/24/24 and to schedule a virtual visit with Dr. Zavaleta.

## 2024-10-07 NOTE — TELEPHONE ENCOUNTER
RN called patient and scheduled a virtual visit with Dr. Zavaleta on Monday 10/14.    Roxy Mejia RN

## 2024-10-14 ENCOUNTER — VIRTUAL VISIT (OUTPATIENT)
Dept: ORTHOPEDICS | Facility: CLINIC | Age: 74
End: 2024-10-14
Payer: MEDICARE

## 2024-10-14 ENCOUNTER — TELEPHONE (OUTPATIENT)
Dept: ORTHOPEDICS | Facility: CLINIC | Age: 74
End: 2024-10-14

## 2024-10-14 DIAGNOSIS — I50.32 CHRONIC DIASTOLIC CHF (CONGESTIVE HEART FAILURE) (H): Primary | ICD-10-CM

## 2024-10-14 DIAGNOSIS — M40.37 FLATBACK SYNDROME OF LUMBOSACRAL REGION: ICD-10-CM

## 2024-10-14 DIAGNOSIS — Z98.1 S/P LUMBAR FUSION: ICD-10-CM

## 2024-10-14 DIAGNOSIS — M51.369 LUMBAR ADJACENT SEGMENT DISEASE WITH SPONDYLOLISTHESIS: ICD-10-CM

## 2024-10-14 DIAGNOSIS — M43.16 LUMBAR ADJACENT SEGMENT DISEASE WITH SPONDYLOLISTHESIS: ICD-10-CM

## 2024-10-14 DIAGNOSIS — M51.34 ADJACENT SEGMENT DISEASE OF THORACIC SPINE WITH HISTORY OF FUSION PROCEDURE: ICD-10-CM

## 2024-10-14 DIAGNOSIS — Z98.1 ADJACENT SEGMENT DISEASE OF THORACIC SPINE WITH HISTORY OF FUSION PROCEDURE: ICD-10-CM

## 2024-10-14 PROCEDURE — 99442 PR PHYSICIAN TELEPHONE EVALUATION 11-20 MIN: CPT | Performed by: ORTHOPAEDIC SURGERY

## 2024-10-14 NOTE — TELEPHONE ENCOUNTER
!! Pt is logged into VIDEO and waiting on Likelii. Pt is aware that MD is running late. [facility] was contacted at 3 PM. Pt is aware there is one more pt in front of him. Pt's video has possibly shut off. Pt's video is now showing After Visit Summary, but pt has yet to have his appt. Please CALL pt on the [phone] line, IF the video is not working. Thank you.

## 2024-10-14 NOTE — LETTER
10/14/2024      Emile Wood  [address]  [facility] [address]      Dear Colleague,    Thank you for referring your patient, Emile Wood, to the [facility]. Please see a copy of my visit note below.    Telephone visit follow up     Emile Wood is a pleasant 73 year old male with past history of T12-pelvis fusion for flatback and adjacent segment degeneration.  Postoperative course complicated by proximal junction failure with kyphosis.  Has been dealing with severe back pain  at the proximal junction.  Continues walking 1-3 miles with trekking poles however his pain is such that he takes 10 mg oxycodone several times daily.      Today we discussed that I do not expect his symptoms to improve with continued nonoperative management.  I believe surgery to extend his fusion construct proximally with cement augmentation of the proximal adjacent vertebrae would be the best surgical approach.  While he is not excited about additional surgery, he recognizes that this is likely the best path forward.      They will consider this and will reach out when they decide how to proceed.     Time spent on the telephone with patient and his wife was approximately 17 minutes.  Additional 8 minutes spent on chart review, care coordination and documentation on the date of encounter.    Nacho Zavaleta MD  , Department of Orthopedic Surgery  [facility]        Again, thank you for allowing me to participate in the care of your patient.        Sincerely,        Nacho Zavaleta MD

## 2024-10-14 NOTE — NURSING NOTE
Reason For Visit:   Chief Complaint   Patient presents with    RECHECK     video visit mychart Review CT scan       Primary MD: No Ref-Primary, Physician  Ref. MD: EST    ?  No  Occupation retired.     Date of injury: No  Type of injury: No     Date of surgery & Type:  1999 Lumbar herniated disc     2016 L2-3 BILATERAL LAMINECTOMY DISCECTOMY, L3-5 DECOMPRESSIVE LAMINECTOMY      10/27/2022 Redo left L2-3 decompression with L2-3 Transforaminal Lumbar Interbody Fusion L2-3 posterior lateral instrumentation and fusion      12/26/23 reinstrumentation Lumbar 2-3, posterior spinal fusion Thoracic 12-Sacral 1, bilateral pelvic fixation ([place]), Fowler-Plummer Osteotomy Lumbar 4-5, Sacral 1      Smoker: No  Request smoking cessation information: No    There were no vitals taken for this visit.    Pain Assessment  0-10 Pain Scale: 7  Primary Pain Location: Back    Oswestry (RUPERTO) Questionnaire        8/21/2024    10:56 AM   OSWESTRY DISABILITY INDEX   Count 10   Sum 31   Oswestry Score (%) 62 %      Visual Analog Pain Scale  Back Pain Scale 0-10: 7  Right leg pain: 0  Left leg pain: 0  Neck Pain Scale 0-10: 0  Right arm pain: 0  Left arm pain: 0    Promis 10 Assessment         No data to display                         Magdalena Siu LPN

## 2024-10-15 ENCOUNTER — TELEPHONE (OUTPATIENT)
Dept: ORTHOPEDICS | Facility: CLINIC | Age: 74
End: 2024-10-15
Payer: MEDICARE

## 2024-10-15 NOTE — TELEPHONE ENCOUNTER
[facility] Call Center    Phone Message    May a detailed message be left on voicemail: yes     Reason for Call: Other: Returning Roxy's call.     Action Taken: Message routed to:  [facility] ([facility] Orthopedics    Travel Screening: Not Applicable     Date of Service:

## 2024-10-15 NOTE — TELEPHONE ENCOUNTER
Other: Emile called he wants to have Roxy give him a call because he wants to see when he can be seen by Dr. Yanes.      Could we send this information to you in [employer] or would you prefer to receive a phone call?:   Patient would prefer a phone call   Okay to leave a detailed message?: Yes at Cell number on file:    Telephone Information:   Mobile [phone]

## 2024-10-23 ENCOUNTER — OFFICE VISIT (OUTPATIENT)
Dept: ORTHOPEDICS | Facility: CLINIC | Age: 74
End: 2024-10-23
Payer: MEDICARE

## 2024-10-23 DIAGNOSIS — M40.35 FLATBACK SYNDROME OF THORACOLUMBAR REGION: ICD-10-CM

## 2024-10-23 DIAGNOSIS — Z98.1 HISTORY OF LUMBAR SPINAL FUSION: ICD-10-CM

## 2024-10-23 DIAGNOSIS — M43.8X9 SAGITTAL PLANE IMBALANCE: ICD-10-CM

## 2024-10-23 DIAGNOSIS — Z98.1 ADJACENT SEGMENT DISEASE OF THORACIC SPINE WITH HISTORY OF FUSION PROCEDURE: ICD-10-CM

## 2024-10-23 DIAGNOSIS — Z98.1 S/P LUMBAR FUSION: Primary | ICD-10-CM

## 2024-10-23 DIAGNOSIS — M51.34 ADJACENT SEGMENT DISEASE OF THORACIC SPINE WITH HISTORY OF FUSION PROCEDURE: ICD-10-CM

## 2024-10-23 PROCEDURE — 99214 OFFICE O/P EST MOD 30 MIN: CPT | Mod: GC | Performed by: ORTHOPAEDIC SURGERY

## 2024-10-23 NOTE — PROGRESS NOTES
Emile follows up today to review CT scans of thoracic and lumbar spine obtained to evaluate fusion status of T12 to pelvis fusion as well as T12 fracture and hardware loosening.     He has continued walking as much as 1-3 miles with his trekking poles.  He does have quite a bit of back pain localizing to thoracolumbar region.  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  He has been on evenity for 8 months now.      His CT scans show stabilized fracture at T12 with obvious compression deformity. In addition there is substantial halo around the T12 screws and an impressive vacuum phenomenon in the T12-L1 disc space.  I explained that these findings are evidence of motion within the T12-L1 disc space.  It is possible, and likely probable, that the screws at T12 will continue to loosen and could become more painful.  This motion could also result in failure of the rods or screws at that level.     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  If he feels he is able to function relatively well and is okay with being able to walk up to a couple miles but but needing to use trekking poles and pain medications I don't believe he needs any intervention to address his PJK.  If his current functional status and pain are unacceptable to him I would recommend extending the fusion mass proximally with correction of the kyphotic deformity. I would recommend cement augmentation at the upper instrumented level as well as the two proximal adjacent levels to reduce the risk of proximal junction failure.  The exact upper instrumented levels would be decided based on modeling with [employer] software but I  expect going up to T9 or T8.  They will consider their options and let me know how they wish to proceed.     Time spent on this clinical encounter including previsit chart review, history and physical examination, patient counseling and documentation was 30 minutes on the date of encounter.    Nacho Zavaleta MD  , Department of Orthopedic Surgery  [facility]

## 2024-10-23 NOTE — PROGRESS NOTES
Spine Surgery Follow Up    REFERRING PHYSICIAN: Nacho Zavaleta   PRIMARY CARE PHYSICIAN: No Ref-Primary, Physician           Chief Complaint:   RECHECK (Review CT scan)    History of Present Illness:  Symptom Profile Including: location of symptoms, onset, severity, exacerbating/alleviating factors, previous treatments:        Emile Wood is a 73 year old male who presents today for routine 9-month follow-up after undergoing T12-S1 posterior instrumented spinal fusion with pelvic fixation and Smith-Plummer osteotomy at L4-5 and L5-S1.    At last follow-up on 8/21/2024 he was noted to have midline back pain over the lumbar back that is worse with activity and improved with rest.  A CT scan of the spine was obtained to evaluate for pseudoarthrosis.    Today he returns to clinic and feels that his symptoms have improved slightly but he still requires the use of assistive devices (bilateral walking canes) to mobilize.  He denies any radicular pain radiating to the legs.  He denies bowel or bladder incontinence. He is wondering what treatment options are available to him to improve his pain and assist with function.    RUPERTO Scores:  Oswestry (RUPERTO) Questionnaire      10/23/2024     7:55 AM   OSWESTRY DISABILITY INDEX   Count 5    Sum 8    Oswestry Score (%) 32 %        Patient-reported            Physical Exam:   PHYSICAL EXAM:   Constitutional - Patient is healthy, well-nourished and appears stated age   Respiratory - Patient is breathing normally and in no respiratory distress.   Skin - No suspicious rashes or lesions.   Psychiatric - Normal mood and affect.   Cardiovascular - Extremities warm and well perfused.   Eyes - Visual acuity is normal to the written word.   ENT - Hearing intact to the spoken word.   GI - No abdominal distention.   Musculoskeletal - Non-antalgic gait with use of walking canes        Cervical spine:    Appearance - Normal      Motor -     UPPER EXTREMITY Left Right    strength 5/5  5/5   Finger ab/adduction 5/5 5/5   Wrist flexion 5/5 5/5   Wrist extension 5/5 5/5   Elbow flexion 5/5 5/5   Elbow extension 5/5 5/5   Shoulder flexion 5/5 5/5               Thoracic Spine:    Appearance - Normal     Palpation - Non-tender to palpation     Strength/ROM - deferred            Lumbar Spine:    Appearance - Normal    Palpation - Non-tender to palpation, well-healed posterior incision    ROM -limited due to spinal fusion    Motor -        LOWER EXTREMITY Left Right   Hip flexion 5/5 5/5   Knee flexion 5/5 5/5   Knee extension 5/5 5/5   Ankle dorsiflexion 5/5 5/5   Ankle plantarflexion 5/5 5/5   Great toe extension 5/5 5/5         Neurologic - Sensation intact to light touch bilaterally.      REFLEXES Left Right   Patella 1+ 1+   Ankle jerk 1+ 1+   Clonus 0 beats 0 beats            Imaging:   I ordered and independently reviewed new radiographs at this clinic visit. The results were discussed with the patient.  Findings include:    CT scan of the spine obtained 9/25/2024 independently reviewed which demonstrates stable compression fracture at T12 with vacuum phenomenon and haloing around the screws suggestive of micromotion, well-healed fusion mass posteriorly without evidence of pseudoarthrosis.  Remainder of construct without evidence of loosening, screw backout, candida breakage, or other failure.           Assessment and Plan:   73 year old male now 9 months status post the above.  Clinically patient is slowly improving, CT demonstrates evidence of micromotion but compression fracture is stable and fusion mass is robust.    We reviewed the history, physical exam, and imaging with the patient and explained that his imaging does not show any evidence of pseudoarthrosis or nonunion of the fusion and in fact it appears that it is healing well.  We did have an in depth discussion about the T12 vertebrae and the endplate fracture as well as the evidence of air on CT suggestive of micromotion and vacuum  phenomenon at the T12-L1 level.  We explained that it is possible although unlikely that the fracture will progress further at this point.    In regards to how to manage his ongoing pain we recommended (and provided) a referral to physical therapy for core strengthening and stabilization exercises.  We emphasized the physical therapy exercises that he can begin even today and demonstrated bird-dog's as well as dead bugs.  We discussed the use of a TENS unit to provide pain relief and showed the patient how he could acquire one via [employer].  The patient did have questions if he could see his chiropractor who has been helpful in managing his neck pain and specifically had questions about whether the use of a laser would be appropriate, explained that this would be unlikely to cause damage to the fusion and is worth trying and continuing if it provides significant relief.  We also spent a considerable amount of time counseling the patient on the importance of bone optimization with the medications he is using as well as continued follow-up with endocrine.  We will plan to have the patient follow-up in 6 months with repeat two-view thoracolumbar films at that time.  We encouraged the patient to reach out at any time if issues arise prior to the next scheduled follow-up.    Plan:  PT referral ([facility])  Discussed use of TENS therapy and chiropractor  Follow-up in 6 months with repeat two-view thoracolumbar films at that time.    Ample time and opportunity was provided for patient to ask questions, all of which were answered to their satisfaction.    Willie Mims MD  Orthopaedic Surgery Resident  [facility]  10/23/24    Assessment and plan discussed with Dr. Zavaleta who is in agreement with the above.    Respectfully,  Nacho Zavaleta MD  Orthopaedic Spine Surgery  Dept Orthopaedic Surgery, [facility]  [facility] Phone: [phone]    Dictation Disclaimer: Some of this Note has  been completed with voice-recognition dictation software. Although errors are generally corrected real-time, there is the potential for a rare error to be present in the completed chart.

## 2024-10-23 NOTE — PROGRESS NOTES
Writer spoke with patient on the phone. Pt will call to schedule a 6 month follow up appointment when ready to schedule.     Magdalena Siu LPN

## 2024-10-23 NOTE — LETTER
10/23/2024      Emile Wood  [id number]  [place] [facility] [place] [address]      Dear Colleague,    Thank you for referring your patient, Emile Wood, to the [facility] [place]. Please see a copy of my visit note below.    Emile follows up today to review CT scans of thoracic and lumbar spine obtained to evaluate fusion status of T12 to pelvis fusion as well as T12 fracture and hardware loosening.     He has continued walking as much as 1-3 miles with his trekking poles.  He does have quite a bit of back pain localizing to thoracolumbar region.  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  He has been on evenity for 8 months now.      His CT scans show stabilized fracture at T12 with obvious compression deformity. In addition there is substantial halo around the T12 screws and an impressive vacuum phenomenon in the T12-L1 disc space.  I explained that these findings are evidence of motion within the T12-L1 disc space.  It is possible, and likely probable, that the screws at T12 will continue to loosen and could become more painful.  This motion could also result in failure of the rods or screws at that level.     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  If he feels he is able to function relatively well and is okay with being able to walk up to a couple miles but but needing to use trekking poles and pain medications I don't believe he needs any intervention to address his PJK.  If his current functional status and pain are unacceptable to him I would recommend extending the fusion mass proximally with correction of the kyphotic deformity. I would  recommend cement augmentation at the upper instrumented level as well as the two proximal adjacent levels to reduce the risk of proximal junction failure.  The exact upper instrumented levels would be decided based on modeling with [employer] software but I expect going up to T9 or T8.  They will consider their options and let me know how they wish to proceed.     Time spent on this clinical encounter including previsit chart review, history and physical examination, patient counseling and documentation was 30 minutes on the date of encounter.    Nacho Zavaleta MD  , Department of Orthopedic Surgery  [facility]          Spine Surgery Follow Up    REFERRING PHYSICIAN: Nacho Zavaleta   PRIMARY CARE PHYSICIAN: No Ref-Primary, Physician           Chief Complaint:   RECHECK (Review CT scan)    History of Present Illness:  Symptom Profile Including: location of symptoms, onset, severity, exacerbating/alleviating factors, previous treatments:        Emile Wood is a 73 year old male who presents today for routine 9-month follow-up after undergoing T12-S1 posterior instrumented spinal fusion with pelvic fixation and Smith-Plummer osteotomy at L4-5 and L5-S1.    At last follow-up on 8/21/2024 he was noted to have midline back pain over the lumbar back that is worse with activity and improved with rest.  A CT scan of the spine was obtained to evaluate for pseudoarthrosis.    Today he returns to clinic and feels that his symptoms have improved slightly but he still requires the use of assistive devices (bilateral walking canes) to mobilize.  He denies any radicular pain radiating to the legs.  He denies bowel or bladder incontinence. He is wondering what treatment options are available to him to improve his pain and assist with function.    RUPERTO Scores:  Oswestry (RUPERTO) Questionnaire      10/23/2024     7:55 AM   OSWESTRY DISABILITY INDEX   Count 5    Sum  8    Oswestry Score (%) 32 %        Patient-reported            Physical Exam:   PHYSICAL EXAM:   Constitutional - Patient is healthy, well-nourished and appears stated age   Respiratory - Patient is breathing normally and in no respiratory distress.   Skin - No suspicious rashes or lesions.   Psychiatric - Normal mood and affect.   Cardiovascular - Extremities warm and well perfused.   Eyes - Visual acuity is normal to the written word.   ENT - Hearing intact to the spoken word.   GI - No abdominal distention.   Musculoskeletal - Non-antalgic gait with use of walking canes        Cervical spine:    Appearance - Normal      Motor -     UPPER EXTREMITY Left Right    strength 5/5 5/5   Finger ab/adduction 5/5 5/5   Wrist flexion 5/5 5/5   Wrist extension 5/5 5/5   Elbow flexion 5/5 5/5   Elbow extension 5/5 5/5   Shoulder flexion 5/5 5/5               Thoracic Spine:    Appearance - Normal     Palpation - Non-tender to palpation     Strength/ROM - deferred            Lumbar Spine:    Appearance - Normal    Palpation - Non-tender to palpation, well-healed posterior incision    ROM -limited due to spinal fusion    Motor -        LOWER EXTREMITY Left Right   Hip flexion 5/5 5/5   Knee flexion 5/5 5/5   Knee extension 5/5 5/5   Ankle dorsiflexion 5/5 5/5   Ankle plantarflexion 5/5 5/5   Great toe extension 5/5 5/5         Neurologic - Sensation intact to light touch bilaterally.      REFLEXES Left Right   Patella 1+ 1+   Ankle jerk 1+ 1+   Clonus 0 beats 0 beats            Imaging:   I ordered and independently reviewed new radiographs at this clinic visit. The results were discussed with the patient.  Findings include:    CT scan of the spine obtained 9/25/2024 independently reviewed which demonstrates stable compression fracture at T12 with vacuum phenomenon and haloing around the screws suggestive of micromotion, well-healed fusion mass posteriorly without evidence of pseudoarthrosis.  Remainder of construct  without evidence of loosening, screw backout, candida breakage, or other failure.           Assessment and Plan:   73 year old male now 9 months status post the above.  Clinically patient is slowly improving, CT demonstrates evidence of micromotion but compression fracture is stable and fusion mass is robust.    We reviewed the history, physical exam, and imaging with the patient and explained that his imaging does not show any evidence of pseudoarthrosis or nonunion of the fusion and in fact it appears that it is healing well.  We did have an in depth discussion about the T12 vertebrae and the endplate fracture as well as the evidence of air on CT suggestive of micromotion and vacuum phenomenon at the T12-L1 level.  We explained that it is possible although unlikely that the fracture will progress further at this point.    In regards to how to manage his ongoing pain we recommended (and provided) a referral to physical therapy for core strengthening and stabilization exercises.  We emphasized the physical therapy exercises that he can begin even today and demonstrated bird-dog's as well as dead bugs.  We discussed the use of a TENS unit to provide pain relief and showed the patient how he could acquire one via [employer].  The patient did have questions if he could see his chiropractor who has been helpful in managing his neck pain and specifically had questions about whether the use of a laser would be appropriate, explained that this would be unlikely to cause damage to the fusion and is worth trying and continuing if it provides significant relief.  We also spent a considerable amount of time counseling the patient on the importance of bone optimization with the medications he is using as well as continued follow-up with endocrine.  We will plan to have the patient follow-up in 6 months with repeat two-view thoracolumbar films at that time.  We encouraged the patient to reach out at any time if issues arise prior to  the next scheduled follow-up.    Plan:  PT referral ([facility])  Discussed use of TENS therapy and chiropractor  Follow-up in 6 months with repeat two-view thoracolumbar films at that time.    Ample time and opportunity was provided for patient to ask questions, all of which were answered to their satisfaction.    Willie Mims MD  Orthopaedic Surgery Resident  [facility]  10/23/24    Assessment and plan discussed with Dr. Zavaleta who is in agreement with the above.    Respectfully,  Nacho Zavaleta MD  Orthopaedic Spine Surgery  Dept Orthopaedic Surgery, [facility]  [facility] Phone: [phone]    Dictation Disclaimer: Some of this Note has been completed with voice-recognition dictation software. Although errors are generally corrected real-time, there is the potential for a rare error to be present in the completed chart.     Writer spoke with patient on the phone. Pt will call to schedule a 6 month follow up appointment when ready to schedule.     Magdalena Siu LPN      Again, thank you for allowing me to participate in the care of your patient.        Sincerely,        Nacho Zavaleta MD

## 2024-10-23 NOTE — NURSING NOTE
Reason For Visit:   Chief Complaint   Patient presents with    RECHECK     Review CT scan       Primary MD: No Ref-Primary, Physician  Ref. MD: EST    ?  No  Occupation retired.     Date of injury: No  Type of injury: No     Date of surgery & Type:  1999 Lumbar herniated disc     2016 L2-3 BILATERAL LAMINECTOMY DISCECTOMY, L3-5 DECOMPRESSIVE LAMINECTOMY      10/27/2022 Redo left L2-3 decompression with L2-3 Transforaminal Lumbar Interbody Fusion L2-3 posterior lateral instrumentation and fusion      12/26/23 reinstrumentation Lumbar 2-3, posterior spinal fusion Thoracic 12-Sacral 1, bilateral pelvic fixation (Butner), Fowler-Plummer Osteotomy Lumbar 4-5, Sacral 1      Smoker: No  Request smoking cessation information: No    There were no vitals taken for this visit.    Pain Assessment  Patient Currently in Pain: Yes  0-10 Pain Scale: 6  Primary Pain Location: Back    Oswestry (RUPERTO) Questionnaire        10/23/2024     7:55 AM   OSWESTRY DISABILITY INDEX   Count 5    Sum 8    Oswestry Score (%) 32 %        Patient-reported              Promis 10 Assessment         No data to display                         Magdalena Siu LPN

## 2024-10-26 PROBLEM — M43.8X9 SAGITTAL PLANE IMBALANCE: Status: ACTIVE | Noted: 2024-10-26

## 2024-10-26 PROBLEM — Z98.1 HISTORY OF LUMBAR SPINAL FUSION: Status: ACTIVE | Noted: 2022-10-27

## 2024-10-26 PROBLEM — M40.37 FLATBACK SYNDROME OF LUMBOSACRAL REGION: Status: RESOLVED | Noted: 2023-10-04 | Resolved: 2024-10-26

## 2024-10-26 PROBLEM — M40.35 FLATBACK SYNDROME OF THORACOLUMBAR REGION: Status: ACTIVE | Noted: 2024-10-26

## 2024-11-08 ENCOUNTER — TELEPHONE (OUTPATIENT)
Dept: ORTHOPEDICS | Facility: CLINIC | Age: 74
End: 2024-11-08
Payer: MEDICARE

## 2024-11-08 NOTE — TELEPHONE ENCOUNTER
Other: Eveline from [facility] would like Dr. Zavaleta to send over a copy of the physical therapy referral since patient will be going over there for PT. Fax number: [phone]     Could we send this information to you in [id number] or would you prefer to receive a phone call?:   Patient would prefer a phone call   Okay to leave a detailed message?: Yes at Other phone number:  [phone]

## 2024-12-21 ENCOUNTER — HEALTH MAINTENANCE LETTER (OUTPATIENT)
Age: 74
End: 2024-12-21

## 2025-07-02 ENCOUNTER — APPOINTMENT (OUTPATIENT)
Dept: URBAN - METROPOLITAN AREA CLINIC 256 | Age: 75
Setting detail: DERMATOLOGY
End: 2025-07-02

## 2025-07-02 VITALS — WEIGHT: 225 LBS | HEIGHT: 64 IN

## 2025-07-02 DIAGNOSIS — B00.1 HERPESVIRAL VESICULAR DERMATITIS: ICD-10-CM

## 2025-07-02 DIAGNOSIS — L82.0 INFLAMED SEBORRHEIC KERATOSIS: ICD-10-CM

## 2025-07-02 PROCEDURE — OTHER COUNSELING: OTHER

## 2025-07-02 PROCEDURE — OTHER MIPS QUALITY: OTHER

## 2025-07-02 PROCEDURE — OTHER PHOTO-DOCUMENTATION: OTHER

## 2025-07-02 PROCEDURE — OTHER PRESCRIPTION: OTHER

## 2025-07-02 PROCEDURE — 99213 OFFICE O/P EST LOW 20 MIN: CPT | Mod: 25

## 2025-07-02 PROCEDURE — OTHER LIQUID NITROGEN: OTHER

## 2025-07-02 PROCEDURE — OTHER PRESCRIPTION MEDICATION MANAGEMENT: OTHER

## 2025-07-02 PROCEDURE — 17110 DESTRUCT B9 LESION 1-14: CPT

## 2025-07-02 RX ORDER — VALACYCLOVIR 1 G/1
TABLET, FILM COATED ORAL
Qty: 40 | Refills: 1 | Status: ERX | COMMUNITY
Start: 2025-07-02

## 2025-07-02 ASSESSMENT — LOCATION SIMPLE DESCRIPTION DERM: LOCATION SIMPLE: RIGHT EAR

## 2025-07-02 ASSESSMENT — LOCATION DETAILED DESCRIPTION DERM: LOCATION DETAILED: RIGHT CYMBA CONCHA

## 2025-07-02 ASSESSMENT — LOCATION ZONE DERM: LOCATION ZONE: EAR

## (undated) DEVICE — LINEN TOWEL PACK X6 WHITE 5487

## (undated) DEVICE — GLOVE BIOGEL PI MICRO INDICATOR UNDERGLOVE SZ 6.5 48965

## (undated) DEVICE — CUSHION INSERT LG PRONE VIEW JACKSON TABLE

## (undated) DEVICE — DRSG AQUACEL AG HYDROFIBER  3.5X10" 422605

## (undated) DEVICE — SPONGE COTTONOID 1/2X1" 80-1402

## (undated) DEVICE — GLOVE BIOGEL PI MICRO INDICATOR UNDERGLOVE SZ 8.5 48985

## (undated) DEVICE — DECANTER BAG 2002S

## (undated) DEVICE — APPLICATORS COTTON TIP 6"X2 STERILE LF C15053-006

## (undated) DEVICE — GLOVE BIOGEL PI MICRO SZ 8.5 48585

## (undated) DEVICE — SOL ISOPROPYL RUBBING ALCOHOL USP 70% 4OZ HDX-20 I0020

## (undated) DEVICE — DRSG KERLIX FLUFFS X5

## (undated) DEVICE — SUCTION TIP YANKAUER W/O VENT K86

## (undated) DEVICE — SUCTION CANISTER MEDIVAC LINER 3000ML W/LID 65651-530

## (undated) DEVICE — GLOVE BIOGEL PI MICRO SZ 7.5 48575

## (undated) DEVICE — GOWN REINFORCED XXLG 9071

## (undated) DEVICE — SU DERMABOND PRINEO 22CM CLR222US

## (undated) DEVICE — RX VISTASEAL FIBRIN SEALANT W/THROMBIN 10ML VST10

## (undated) DEVICE — SU STRATAFIX MONOCRYL 3-0 SPIRAL PS-1 45CM SXMP1B102

## (undated) DEVICE — TUBING SUCTION 12"X1/4" N612

## (undated) DEVICE — GLOVE BIOGEL PI MICRO SZ 6.5 48565

## (undated) DEVICE — SUCTION MANIFOLD NEPTUNE 2 SYS 4 PORT 0702-020-000

## (undated) DEVICE — PEN MARKING W/RULER DYNJSM04

## (undated) DEVICE — SUCTION FRAZIER 12FR W/OBTURATOR 33120

## (undated) DEVICE — SPECIMEN TRAP MUCOUS SIMILAC NTRL CARE GRAD 80ML 0045860

## (undated) DEVICE — PACK SET-UP STD 9102

## (undated) DEVICE — SYR BULB IRRIG DOVER 60 ML LATEX FREE 67000

## (undated) DEVICE — SU VICRYL 2-0 CT-2 27" J333H

## (undated) DEVICE — GLOVE BIOGEL PI SZ 8.0 40880

## (undated) DEVICE — GOWN XLG DISP 9545

## (undated) DEVICE — NDL SCLEROTHERAPY 25GA CARR-LOCK  00711811

## (undated) DEVICE — LINEN ORTHO ACL PACK 5447

## (undated) DEVICE — MARKER SPHERES PASSIVE MEDT PACK 5 8801075

## (undated) DEVICE — CATH TRAY FOLEY SURESTEP 16FR W/TMP PRB STLK LATEX A319416AM

## (undated) DEVICE — BLADE CLIPPER SGL USE 9680

## (undated) DEVICE — SU VICRYL 0 CT-1 CR 8X18" J740D

## (undated) DEVICE — NDL BLUNT 18GA 1" W/O FILTER 305181

## (undated) DEVICE — DRAPE SHEET REV FOLD 3/4 9349

## (undated) DEVICE — DRAPE POUCH INSTRUMENT 1018

## (undated) DEVICE — ENDO FORCEP BX CAPTURA JUMBO SPIKE 2.8MMX230CM G53042

## (undated) DEVICE — TOOL DISSECT MIDAS MR8 14CM MATCH HEAD 3MM MR8-14MH30

## (undated) DEVICE — SOL WATER IRRIG 1000ML BOTTLE 2F7114

## (undated) DEVICE — ENDO MARKER SPOT 5ML

## (undated) DEVICE — SYR 30ML LL W/O NDL 302832

## (undated) DEVICE — BAG CLEAR TRASH 1.3M 39X33" P4040C

## (undated) DEVICE — ESU GROUND PAD ADULT W/CORD E7507

## (undated) DEVICE — SU VICRYL 1 CT-1 CR 8X18" J741D

## (undated) DEVICE — STPL SKIN 35W 6.9MM  PXW35

## (undated) DEVICE — TUBING SUCTION MEDI-VAC SOFT 3/16"X20' N520A

## (undated) DEVICE — BAG RED BIOHAZARD 30.5X43" G4300XR

## (undated) DEVICE — CLIP HEMOCLIP ENDOSCOPIC INSTINCT 2.8X230CM INSC-7-230-SS

## (undated) DEVICE — DRAIN JACKSON PRATT RESERVOIR 100ML SU130-1305

## (undated) DEVICE — RX SURGIFLO HEMOSTATIC MATRIX 8ML 2991

## (undated) DEVICE — CELL SAVER

## (undated) DEVICE — DRAIN JACKSON PRATT CHANNEL 10FR RND SIL W/TROCAR JP-2227

## (undated) DEVICE — SOL NACL 0.9% IRRIG 1000ML BOTTLE 2F7124

## (undated) DEVICE — SPONGE SURGIFOAM 100 1974

## (undated) DEVICE — PACK SMALL SPINE RIDGES

## (undated) DEVICE — DRAPE MICROSCOPE OPMI ZEISS 48X118" 306071-0000-000

## (undated) DEVICE — SUCTION TIP YANKAUER STR K87

## (undated) DEVICE — DRAPE U SPLIT 74X120" 29440

## (undated) DEVICE — Device

## (undated) DEVICE — ENDO FORCEP ENDOJAW BIOPSY 2.8MMX160CM FB-220K

## (undated) DEVICE — TEST TUBE W/SCREW CAP 17361

## (undated) DEVICE — DECANTER VIAL 2006S

## (undated) DEVICE — DRAPE O ARM TUBE 9732722

## (undated) DEVICE — TOOL DISSECT MIDAS MR8 14CM MATCH HD DMD 3MM MR8-14MH30D

## (undated) DEVICE — PAD CHUX UNDERPAD 23X24" 7136

## (undated) DEVICE — BLADE BONE MILL STRK 5.0MM MED 5400-701-000

## (undated) DEVICE — DRAPE X-RAY TUBE 00-901169-01-OEC

## (undated) DEVICE — ESU HOLSTER PLASTIC DISP E2400

## (undated) DEVICE — SU STRATAFIX PDS PLUS 0 CT-1 18" SXPP1A401

## (undated) DEVICE — DRSG TELFA ISLAND 4X10"

## (undated) DEVICE — LABEL MEDICATION SYSTEM 3303-P

## (undated) DEVICE — STPL SKIN PROXIMATE 35 WIDE PMW35

## (undated) DEVICE — DRSG PRIMAPORE 02X3" 7133

## (undated) DEVICE — CATH TRAY FOLEY COUDE SURESTEP 16FR W/DRN BAG LATEX A304416A

## (undated) DEVICE — KIT PROCEDURE W/CLEAN-A-SCOPE LINERS V2 200800

## (undated) DEVICE — LINEN POUCH DBL 5427

## (undated) DEVICE — PAD PROAXIS TABLE KIT SPK10182

## (undated) DEVICE — PEN MARKING SKIN W/LABELS 31145884

## (undated) DEVICE — SU VICRYL 2-0 CT-2 CR 8X18" J726D

## (undated) DEVICE — KIT PATIENT CARE PROAXIS SYSTEM 6988-PV-ACP

## (undated) DEVICE — ESU CORD BIPOLAR GREEN 10-4000

## (undated) DEVICE — NDL 22GA 1.5"

## (undated) DEVICE — SU MONOCRYL 4-0 PS-2 27" UND Y426H

## (undated) DEVICE — ESU BIPOLAR SEALER AQUAMANTYS 6MM 23-112-1

## (undated) DEVICE — ADH SKIN CLOSURE PREMIERPRO EXOFIN 1.0ML 3470

## (undated) DEVICE — ESU CLEANER TIP 31142717

## (undated) DEVICE — PREP DURAPREP 26ML APL 8630

## (undated) DEVICE — NDL SPINAL 18GA 3.5" 405184

## (undated) DEVICE — LINEN DRAPE 54X72" 5467

## (undated) DEVICE — MIDAS REX DISSECTING TOOL TRI-FLUTED BURR 14MH30T

## (undated) DEVICE — DRAPE TIBURON TOP SHEET 100X60" 29352

## (undated) DEVICE — ENDO SNARE POLYPECTOMY OVAL 15MM LOOP SD-240U-15

## (undated) DEVICE — LINEN TOWEL PACK X30 5481

## (undated) DEVICE — SU VICRYL 2-0 CT-1 18' J739D

## (undated) DEVICE — SOL NACL 0.9% INJ 250ML BAG 2B1322Q

## (undated) DEVICE — GLOVE BIOGEL PI MICRO INDICATOR UNDERGLOVE SZ 8.0 48980

## (undated) DEVICE — DEVICE DUST COLLECTOR BONE BOX S-3500

## (undated) DEVICE — RX SURGIFLO HEMOSTATIC MATRIX W/THROMBIN 8ML 2994

## (undated) DEVICE — PACK SPINE INSTRUMENT DRAPE 76091-ACM7820

## (undated) DEVICE — PREP CHLORAPREP 26ML TINTED HI-LITE ORANGE 930815

## (undated) RX ORDER — NEOSTIGMINE METHYLSULFATE 1 MG/ML
VIAL (ML) INJECTION
Status: DISPENSED
Start: 2022-10-27

## (undated) RX ORDER — FENTANYL CITRATE 50 UG/ML
INJECTION, SOLUTION INTRAMUSCULAR; INTRAVENOUS
Status: DISPENSED
Start: 2023-12-26

## (undated) RX ORDER — HYDROMORPHONE HCL IN WATER/PF 6 MG/30 ML
PATIENT CONTROLLED ANALGESIA SYRINGE INTRAVENOUS
Status: DISPENSED
Start: 2022-10-27

## (undated) RX ORDER — DEXAMETHASONE SODIUM PHOSPHATE 10 MG/ML
INJECTION, SOLUTION INTRAMUSCULAR; INTRAVENOUS
Status: DISPENSED
Start: 2023-12-26

## (undated) RX ORDER — DEXAMETHASONE SODIUM PHOSPHATE 4 MG/ML
INJECTION, SOLUTION INTRA-ARTICULAR; INTRALESIONAL; INTRAMUSCULAR; INTRAVENOUS; SOFT TISSUE
Status: DISPENSED
Start: 2023-12-26

## (undated) RX ORDER — HYDROMORPHONE HYDROCHLORIDE 1 MG/ML
INJECTION, SOLUTION INTRAMUSCULAR; INTRAVENOUS; SUBCUTANEOUS
Status: DISPENSED
Start: 2023-12-26

## (undated) RX ORDER — PROPOFOL 10 MG/ML
INJECTION, EMULSION INTRAVENOUS
Status: DISPENSED
Start: 2022-10-27

## (undated) RX ORDER — METHOCARBAMOL 750 MG/1
TABLET, FILM COATED ORAL
Status: DISPENSED
Start: 2022-10-27

## (undated) RX ORDER — OXYCODONE HYDROCHLORIDE 5 MG/1
TABLET ORAL
Status: DISPENSED
Start: 2022-10-27

## (undated) RX ORDER — FENTANYL CITRATE 50 UG/ML
INJECTION, SOLUTION INTRAMUSCULAR; INTRAVENOUS
Status: DISPENSED
Start: 2018-05-02

## (undated) RX ORDER — GLYCOPYRROLATE 0.2 MG/ML
INJECTION INTRAMUSCULAR; INTRAVENOUS
Status: DISPENSED
Start: 2018-05-02

## (undated) RX ORDER — SODIUM CHLORIDE 9 MG/ML
INJECTION, SOLUTION INTRAVENOUS
Status: DISPENSED
Start: 2023-12-26

## (undated) RX ORDER — CEFAZOLIN SODIUM/WATER 2 G/20 ML
SYRINGE (ML) INTRAVENOUS
Status: DISPENSED
Start: 2023-12-26

## (undated) RX ORDER — CEFAZOLIN SODIUM/WATER 2 G/20 ML
SYRINGE (ML) INTRAVENOUS
Status: DISPENSED
Start: 2022-10-27

## (undated) RX ORDER — FENTANYL CITRATE-0.9 % NACL/PF 10 MCG/ML
PLASTIC BAG, INJECTION (ML) INTRAVENOUS
Status: DISPENSED
Start: 2023-12-26

## (undated) RX ORDER — FENTANYL CITRATE 50 UG/ML
INJECTION, SOLUTION INTRAMUSCULAR; INTRAVENOUS
Status: DISPENSED
Start: 2022-10-27

## (undated) RX ORDER — GLYCOPYRROLATE 0.2 MG/ML
INJECTION INTRAMUSCULAR; INTRAVENOUS
Status: DISPENSED
Start: 2022-10-27

## (undated) RX ORDER — BUPIVACAINE HYDROCHLORIDE AND EPINEPHRINE 5; 5 MG/ML; UG/ML
INJECTION, SOLUTION EPIDURAL; INTRACAUDAL; PERINEURAL
Status: DISPENSED
Start: 2022-10-27

## (undated) RX ORDER — PROPOFOL 10 MG/ML
INJECTION, EMULSION INTRAVENOUS
Status: DISPENSED
Start: 2023-12-26

## (undated) RX ORDER — ONDANSETRON 2 MG/ML
INJECTION INTRAMUSCULAR; INTRAVENOUS
Status: DISPENSED
Start: 2022-10-27

## (undated) RX ORDER — VANCOMYCIN HYDROCHLORIDE 1 G/20ML
INJECTION, POWDER, LYOPHILIZED, FOR SOLUTION INTRAVENOUS
Status: DISPENSED
Start: 2023-12-26

## (undated) RX ORDER — LIDOCAINE HYDROCHLORIDE 10 MG/ML
INJECTION, SOLUTION EPIDURAL; INFILTRATION; INTRACAUDAL; PERINEURAL
Status: DISPENSED
Start: 2018-05-02

## (undated) RX ORDER — CELECOXIB 200 MG/1
CAPSULE ORAL
Status: DISPENSED
Start: 2023-12-26

## (undated) RX ORDER — DEXAMETHASONE SODIUM PHOSPHATE 4 MG/ML
INJECTION, SOLUTION INTRA-ARTICULAR; INTRALESIONAL; INTRAMUSCULAR; INTRAVENOUS; SOFT TISSUE
Status: DISPENSED
Start: 2018-05-02

## (undated) RX ORDER — DEXAMETHASONE SODIUM PHOSPHATE 4 MG/ML
INJECTION, SOLUTION INTRA-ARTICULAR; INTRALESIONAL; INTRAMUSCULAR; INTRAVENOUS; SOFT TISSUE
Status: DISPENSED
Start: 2022-10-27

## (undated) RX ORDER — LABETALOL HYDROCHLORIDE 5 MG/ML
INJECTION, SOLUTION INTRAVENOUS
Status: DISPENSED
Start: 2022-10-27

## (undated) RX ORDER — KETOROLAC TROMETHAMINE 15 MG/ML
INJECTION, SOLUTION INTRAMUSCULAR; INTRAVENOUS
Status: DISPENSED
Start: 2018-05-02

## (undated) RX ORDER — GABAPENTIN 100 MG/1
CAPSULE ORAL
Status: DISPENSED
Start: 2023-12-26

## (undated) RX ORDER — BUPIVACAINE HYDROCHLORIDE AND EPINEPHRINE 2.5; 5 MG/ML; UG/ML
INJECTION, SOLUTION EPIDURAL; INFILTRATION; INTRACAUDAL; PERINEURAL
Status: DISPENSED
Start: 2023-12-26

## (undated) RX ORDER — LIDOCAINE HYDROCHLORIDE 10 MG/ML
INJECTION, SOLUTION EPIDURAL; INFILTRATION; INTRACAUDAL; PERINEURAL
Status: DISPENSED
Start: 2022-10-27

## (undated) RX ORDER — ACETAMINOPHEN 325 MG/1
TABLET ORAL
Status: DISPENSED
Start: 2023-12-26

## (undated) RX ORDER — VANCOMYCIN HYDROCHLORIDE 1 G/20ML
INJECTION, POWDER, LYOPHILIZED, FOR SOLUTION INTRAVENOUS
Status: DISPENSED
Start: 2022-10-27

## (undated) RX ORDER — HYDROMORPHONE HCL IN WATER/PF 6 MG/30 ML
PATIENT CONTROLLED ANALGESIA SYRINGE INTRAVENOUS
Status: DISPENSED
Start: 2023-12-26

## (undated) RX ORDER — PROPOFOL 10 MG/ML
INJECTION, EMULSION INTRAVENOUS
Status: DISPENSED
Start: 2018-05-02

## (undated) RX ORDER — ONDANSETRON 2 MG/ML
INJECTION INTRAMUSCULAR; INTRAVENOUS
Status: DISPENSED
Start: 2018-05-02

## (undated) RX ORDER — ONDANSETRON 2 MG/ML
INJECTION INTRAMUSCULAR; INTRAVENOUS
Status: DISPENSED
Start: 2023-12-26

## (undated) RX ORDER — ACETAMINOPHEN 325 MG/1
TABLET ORAL
Status: DISPENSED
Start: 2022-10-27

## (undated) RX ORDER — FENTANYL CITRATE-0.9 % NACL/PF 10 MCG/ML
PLASTIC BAG, INJECTION (ML) INTRAVENOUS
Status: DISPENSED
Start: 2022-10-27

## (undated) RX ORDER — CEFAZOLIN SODIUM 1 G/3ML
INJECTION, POWDER, FOR SOLUTION INTRAMUSCULAR; INTRAVENOUS
Status: DISPENSED
Start: 2023-12-26